# Patient Record
Sex: FEMALE | Race: WHITE | NOT HISPANIC OR LATINO | Employment: FULL TIME | URBAN - METROPOLITAN AREA
[De-identification: names, ages, dates, MRNs, and addresses within clinical notes are randomized per-mention and may not be internally consistent; named-entity substitution may affect disease eponyms.]

---

## 2019-06-26 ENCOUNTER — TELEPHONE (OUTPATIENT)
Dept: INTERNAL MEDICINE CLINIC | Facility: CLINIC | Age: 24
End: 2019-06-26

## 2019-06-30 PROBLEM — Z76.89 ENCOUNTER TO ESTABLISH CARE: Status: ACTIVE | Noted: 2019-06-30

## 2019-06-30 NOTE — PROGRESS NOTES
Assessment/Plan:    Anxiety disorder  -  situational related to specific events  - will start patient on 0 25 mg of alprazolam daily as needed for anxiety  Only 10 tablets prescribed  - I counseled patient on the side effects of addiction and tolerance and counseled her to only take the medication only when absolutely needed   -the [facility] web site was interrogated and did not show misuse  - will check a TSH and a CBC  - patient will need to sign controlled substances agreement before she can get a refill    Seasonal allergies  - well controlled on Zyrtec  -continue with Zyrtec    Need for annual physical exam  - patient has not had a physical done in years  - will order a CBC, CMP, TSH, lipid panel  -patient will return for an annual physical exam in about 1 month  - follow-up in 1 month     Diagnoses and all orders for this visit:    Encounter to establish care    Seasonal allergies  -     CBC and differential; Future    Other specified anxiety disorders  -     TSH, 3rd generation with Free T4 reflex; Future  -     ALPRAZolam (XANAX) 0 25 mg tablet; Take 1 tablet (0 25 mg total) by mouth daily as needed for anxiety    Annual physical exam  -     CBC and differential; Future  -     TSH, 3rd generation with Free T4 reflex; Future  -     Comprehensive metabolic panel; Future  -     Lipid panel; Future    Morbid obesity ([address] )    Other orders  -     cetirizine (ZyrTEC) 10 mg tablet; Take 10 mg by mouth daily          Subjective:      Patient ID: Constance Engle is a 30 y o  female  HPI  Pt is here to establish care  She has a history of anxiety for most of her life  She also has a history of seasonal allergies for which she takes Zyrtec  She also has a family history of anxiety in her grandmother  Patient states that her anxiety occurs when she is involved events such as her sister's bridal shower and wedding and she is most afraid that she may mess up and that people will know that she is anxious  She has associated symptoms of palpitations, dizziness, tremors and diarrhea sometimes  She denies shortness of breath, syncope, a fear of passing out  She states that she can go for months without having any anxiety attack  Her last anxiety attack was about a month ago during her sister's bridal shower  She has not had blood work done in about 5 years  She does not know if there is a family history of thyroid disorder  She denies fatigue, fever, chills, night sweats, chest pain, shortness of breath, nausea, vomiting, abdominal pain, diarrhea, constipation  She admits to weight gain and difficulty losing weight in the past few years  She denies any feelings of depression and poor interest   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  She occasionally drinks alcohol, she does not smoke and she does not use recreational drugs  She denies a family history of drug abuse or addiction  Patient tells me that her height is 5 feet 4 inches and her weight today is 269 with the BMI calculated at 46 17    The following portions of the patient's history were reviewed and updated as appropriate:   She  has no past medical history on file  She   Patient Active Problem List    Diagnosis Date Noted    Seasonal allergies 07/01/2019    Other specified anxiety disorders 07/01/2019    Annual physical exam 07/01/2019    Morbid obesity ([address] ) 07/01/2019    Encounter to establish care 06/30/2019     She  has a past surgical history that includes [place] tooth extraction  Her family history includes Anxiety disorder in her maternal grandmother; Diabetes in her paternal aunt and paternal uncle; Hypertension in her father  She  reports that she has never smoked  She has never used smokeless tobacco  She reports that she drinks alcohol  She reports that she does not use drugs    Current Outpatient Medications   Medication Sig Dispense Refill    cetirizine (ZyrTEC) 10 mg tablet Take 10 mg by mouth daily      ALPRAZolam (XANAX) 0 25 mg tablet Take 1 tablet (0 25 mg total) by mouth daily as needed for anxiety 10 tablet 0     No current facility-administered medications for this visit  Current Outpatient Medications on File Prior to Visit   Medication Sig    cetirizine (ZyrTEC) 10 mg tablet Take 10 mg by mouth daily     No current facility-administered medications on file prior to visit  She is allergic to pollen extract       Review of Systems   Constitutional: Positive for unexpected weight change  Negative for activity change, chills, fatigue and fever  HENT: Positive for congestion and rhinorrhea  Negative for ear pain, postnasal drip, sinus pressure and sore throat  Eyes: Negative for pain  Respiratory: Negative for cough, choking, chest tightness, shortness of breath and wheezing  Cardiovascular: Positive for palpitations  Negative for chest pain and leg swelling  Gastrointestinal: Positive for diarrhea  Negative for abdominal pain, constipation, nausea and vomiting  Genitourinary: Negative for dysuria and hematuria  Musculoskeletal: Negative for arthralgias, back pain, gait problem, joint swelling, myalgias and neck stiffness  Skin: Negative for pallor and rash  Neurological: Positive for dizziness and tremors (only during anxiety attacks)  Negative for seizures, syncope, light-headedness and headaches  Hematological: Negative for adenopathy  Psychiatric/Behavioral: Positive for sleep disturbance  Negative for behavioral problems  The patient is nervous/anxious  History reviewed  No pertinent past medical history        Current Outpatient Medications:     cetirizine (ZyrTEC) 10 mg tablet, Take 10 mg by mouth daily, Disp: , Rfl:     ALPRAZolam (XANAX) 0 25 mg tablet, Take 1 tablet (0 25 mg total) by mouth daily as needed for anxiety, Disp: 10 tablet, Rfl: 0    Allergies   Allergen Reactions    Pollen Extract        Social History   Past Surgical History: Procedure Laterality Date    WISDOM TOOTH EXTRACTION       Family History   Problem Relation Age of Onset    Hypertension Father     Anxiety disorder Maternal Grandmother     Diabetes Paternal Aunt     Diabetes Paternal Uncle        Objective:  /82 (BP Location: Left arm, Patient Position: Sitting, Cuff Size: Large)   Pulse 100   Temp 97 9 °F (36 6 °C) (Oral)   Wt 122 kg (269 lb)   SpO2 98% Comment: ra       Physical Exam   Constitutional: She is oriented to person, place, and time  She appears well-developed and well-nourished  No distress  Morbidly Obese   HENT:   Head: Normocephalic and atraumatic  Right Ear: External ear normal    Left Ear: External ear normal    Nose: Nose normal    Mouth/Throat: Oropharynx is clear and moist  No oropharyngeal exudate  Eyes: Pupils are equal, round, and reactive to light  Conjunctivae and EOM are normal  Right eye exhibits no discharge  Left eye exhibits no discharge  No scleral icterus  Neck: Normal range of motion  Neck supple  No JVD present  No tracheal deviation present  No thyromegaly present  Cardiovascular: Normal rate, regular rhythm, normal heart sounds and intact distal pulses  Exam reveals no gallop and no friction rub  No murmur heard  Pulmonary/Chest: Effort normal and breath sounds normal  No respiratory distress  She has no wheezes  She has no rales  She exhibits no tenderness  Abdominal: Soft  Bowel sounds are normal  She exhibits no distension and no mass  There is no tenderness  There is no rebound and no guarding  Obese abdomen   Musculoskeletal: Normal range of motion  She exhibits no edema, tenderness or deformity  Lymphadenopathy:     She has no cervical adenopathy  Neurological: She is alert and oriented to person, place, and time  She has normal reflexes  No cranial nerve deficit  She exhibits normal muscle tone  Coordination normal    Skin: Skin is warm and dry  No rash noted  She is not diaphoretic  No erythema  No pallor  Psychiatric: She has a normal mood and affect   Her behavior is normal

## 2019-07-01 ENCOUNTER — OFFICE VISIT (OUTPATIENT)
Dept: INTERNAL MEDICINE CLINIC | Facility: CLINIC | Age: 24
End: 2019-07-01
Payer: COMMERCIAL

## 2019-07-01 VITALS
OXYGEN SATURATION: 98 % | TEMPERATURE: 97.9 F | DIASTOLIC BLOOD PRESSURE: 82 MMHG | WEIGHT: 269 LBS | HEART RATE: 100 BPM | SYSTOLIC BLOOD PRESSURE: 128 MMHG

## 2019-07-01 DIAGNOSIS — Z76.89 ENCOUNTER TO ESTABLISH CARE: Primary | ICD-10-CM

## 2019-07-01 DIAGNOSIS — E66.01 MORBID OBESITY (HCC): ICD-10-CM

## 2019-07-01 DIAGNOSIS — F41.8 OTHER SPECIFIED ANXIETY DISORDERS: ICD-10-CM

## 2019-07-01 DIAGNOSIS — J30.2 SEASONAL ALLERGIES: ICD-10-CM

## 2019-07-01 DIAGNOSIS — Z00.00 ANNUAL PHYSICAL EXAM: ICD-10-CM

## 2019-07-01 PROCEDURE — 99203 OFFICE O/P NEW LOW 30 MIN: CPT | Performed by: INTERNAL MEDICINE

## 2019-07-01 PROCEDURE — 1036F TOBACCO NON-USER: CPT | Performed by: INTERNAL MEDICINE

## 2019-07-01 RX ORDER — CETIRIZINE HYDROCHLORIDE 10 MG/1
10 TABLET ORAL AS NEEDED
COMMUNITY

## 2019-07-01 RX ORDER — ALPRAZOLAM 0.25 MG/1
0.25 TABLET ORAL DAILY PRN
Qty: 10 TABLET | Refills: 0 | Status: SHIPPED | OUTPATIENT
Start: 2019-07-01 | End: 2019-10-11 | Stop reason: SDUPTHER

## 2019-07-01 NOTE — PATIENT INSTRUCTIONS
Anxiety   AMBULATORY CARE:   Anxiety  is a condition that causes you to feel extremely worried or nervous  The feelings are so strong that they can cause problems with your daily activities or sleep  Anxiety may be triggered by something you fear, or it may happen without a cause  Family or work stress, smoking, caffeine, and alcohol can increase your risk for anxiety  Certain medicines or health conditions can also increase your risk  Anxiety can become a long-term condition if it is not managed or treated  Common signs and symptoms that may occur with anxiety:   · Fatigue or muscle tightness     · Shaking, restlessness, or irritability     · Problems focusing     · Trouble sleeping     · Feeling jumpy, easily startled, or dizzy     · Rapid heartbeat or shortness of breath  Call 911 if:   · You have chest pain, tightness, or heaviness that may spread to your shoulders, arms, jaw, neck, or back  · You feel like hurting yourself or someone else  Contact your healthcare provider if:   · Your symptoms get worse or do not get better with treatment  · You think your medicine may be causing side effects  · Your anxiety keeps you from doing your regular daily activities  · You have new symptoms since your last visit  · You have questions or concerns about your condition or care  Treatment for anxiety  may include medicines to help you feel calm and relaxed, and decrease your symptoms  Medicines are usually given together with therapy or other treatments  Manage anxiety:   · Talk to someone about your anxiety  Your healthcare provider may suggest counseling  Cognitive behavioral therapy can help you understand and change how you react to events that trigger your symptoms  You might feel more comfortable talking with a friend or family member about your anxiety  Choose someone you know will be supportive and encouraging  · Find ways to relax    Activities such as exercise, meditation, or listening to music can help you relax  Spend time with friends, or do things you enjoy  · Practice deep breathing  Deep breathing can help you relax when you feel anxious  Focus on taking slow, deep breaths several times a day, or during an anxiety attack  Breathe in through your nose and out through your mouth  · Create a regular sleep routine  Regular sleep can help you feel calmer during the day  Go to sleep and wake up at the same times every day  Do not watch television or use the computer right before bed  Your room should be comfortable, dark, and quiet  · Eat a variety of healthy foods  Healthy foods include fruits, vegetables, low-fat dairy products, lean meats, fish, whole-grain breads, and cooked beans  Healthy foods can help you feel less anxious and have more energy  · Exercise regularly  Exercise can increase your energy level  Exercise may also lift your mood and help you sleep better  Your healthcare provider can help you create an exercise plan  · Do not smoke  Nicotine and other chemicals in cigarettes and cigars can increase anxiety  Ask your healthcare provider for information if you currently smoke and need help to quit  E-cigarettes or smokeless tobacco still contain nicotine  Talk to your healthcare provider before you use these products  · Do not have caffeine  Caffeine can make your symptoms worse  Do not have foods or drinks that are meant to increase your energy level  · Limit or do not drink alcohol  Ask your healthcare provider if alcohol is safe for you  You may not be able to drink alcohol if you take certain anxiety or depression medicines  Limit alcohol to 1 drink per day if you are a woman  Limit alcohol to 2 drinks per day if you are a man  A drink of alcohol is 12 ounces of beer, 5 ounces of wine, or 1½ ounces of liquor  · Do not use drugs  Drugs can make your anxiety worse  It can also make anxiety hard to manage   Talk to your healthcare provider if you use drugs and want help to quit  Follow up with your healthcare provider as directed:  Write down your questions so you remember to ask them during your visits  © 2017 [employer] [employer] Information is for End User's use only and may not be sold, redistributed or otherwise used for commercial purposes  All illustrations and images included in CareNotes® are the copyrighted property of [employer]  or [employer]  The above information is an  only  It is not intended as medical advice for individual conditions or treatments  Talk to your doctor, nurse or pharmacist before following any medical regimen to see if it is safe and effective for you

## 2019-07-17 ENCOUNTER — TRANSCRIBE ORDERS (OUTPATIENT)
Dept: ADMINISTRATIVE | Age: 24
End: 2019-07-17

## 2019-07-17 ENCOUNTER — APPOINTMENT (OUTPATIENT)
Dept: LAB | Age: 24
End: 2019-07-17
Payer: COMMERCIAL

## 2019-07-17 DIAGNOSIS — F41.8 OTHER SPECIFIED ANXIETY DISORDERS: ICD-10-CM

## 2019-07-17 DIAGNOSIS — Z00.00 ANNUAL PHYSICAL EXAM: ICD-10-CM

## 2019-07-17 DIAGNOSIS — J30.2 SEASONAL ALLERGIES: ICD-10-CM

## 2019-07-17 LAB
ALBUMIN SERPL BCP-MCNC: 3.7 G/DL (ref 3.5–5)
ALP SERPL-CCNC: 73 U/L (ref 46–116)
ALT SERPL W P-5'-P-CCNC: 27 U/L (ref 12–78)
ANION GAP SERPL CALCULATED.3IONS-SCNC: 7 MMOL/L (ref 4–13)
AST SERPL W P-5'-P-CCNC: 16 U/L (ref 5–45)
BASOPHILS # BLD AUTO: 0.04 THOUSANDS/ΜL (ref 0–0.1)
BASOPHILS NFR BLD AUTO: 1 % (ref 0–1)
BILIRUB SERPL-MCNC: 0.53 MG/DL (ref 0.2–1)
BUN SERPL-MCNC: 13 MG/DL (ref 5–25)
CALCIUM SERPL-MCNC: 9.1 MG/DL (ref 8.3–10.1)
CHLORIDE SERPL-SCNC: 107 MMOL/L (ref 100–108)
CHOLEST SERPL-MCNC: 191 MG/DL (ref 50–200)
CO2 SERPL-SCNC: 25 MMOL/L (ref 21–32)
CREAT SERPL-MCNC: 0.72 MG/DL (ref 0.6–1.3)
EOSINOPHIL # BLD AUTO: 0.11 THOUSAND/ΜL (ref 0–0.61)
EOSINOPHIL NFR BLD AUTO: 2 % (ref 0–6)
ERYTHROCYTE [DISTWIDTH] IN BLOOD BY AUTOMATED COUNT: 13.4 % (ref 11.6–15.1)
GFR SERPL CREATININE-BSD FRML MDRD: 116 ML/MIN/1.73SQ M
GLUCOSE P FAST SERPL-MCNC: 88 MG/DL (ref 65–99)
HCT VFR BLD AUTO: 43 % (ref 34.8–46.1)
HDLC SERPL-MCNC: 76 MG/DL (ref 40–60)
HGB BLD-MCNC: 13.8 G/DL (ref 11.5–15.4)
IMM GRANULOCYTES # BLD AUTO: 0.01 THOUSAND/UL (ref 0–0.2)
IMM GRANULOCYTES NFR BLD AUTO: 0 % (ref 0–2)
LDLC SERPL CALC-MCNC: 93 MG/DL (ref 0–100)
LYMPHOCYTES # BLD AUTO: 2.04 THOUSANDS/ΜL (ref 0.6–4.47)
LYMPHOCYTES NFR BLD AUTO: 33 % (ref 14–44)
MCH RBC QN AUTO: 27.3 PG (ref 26.8–34.3)
MCHC RBC AUTO-ENTMCNC: 32.1 G/DL (ref 31.4–37.4)
MCV RBC AUTO: 85 FL (ref 82–98)
MONOCYTES # BLD AUTO: 0.57 THOUSAND/ΜL (ref 0.17–1.22)
MONOCYTES NFR BLD AUTO: 9 % (ref 4–12)
NEUTROPHILS # BLD AUTO: 3.37 THOUSANDS/ΜL (ref 1.85–7.62)
NEUTS SEG NFR BLD AUTO: 55 % (ref 43–75)
NONHDLC SERPL-MCNC: 115 MG/DL
NRBC BLD AUTO-RTO: 0 /100 WBCS
PLATELET # BLD AUTO: 194 THOUSANDS/UL (ref 149–390)
PMV BLD AUTO: 12.8 FL (ref 8.9–12.7)
POTASSIUM SERPL-SCNC: 3.9 MMOL/L (ref 3.5–5.3)
PROT SERPL-MCNC: 7.1 G/DL (ref 6.4–8.2)
RBC # BLD AUTO: 5.05 MILLION/UL (ref 3.81–5.12)
SODIUM SERPL-SCNC: 139 MMOL/L (ref 136–145)
TRIGL SERPL-MCNC: 111 MG/DL
TSH SERPL DL<=0.05 MIU/L-ACNC: 2.8 UIU/ML (ref 0.36–3.74)
WBC # BLD AUTO: 6.14 THOUSAND/UL (ref 4.31–10.16)

## 2019-07-17 PROCEDURE — 85025 COMPLETE CBC W/AUTO DIFF WBC: CPT

## 2019-07-17 PROCEDURE — 84443 ASSAY THYROID STIM HORMONE: CPT

## 2019-07-17 PROCEDURE — 36415 COLL VENOUS BLD VENIPUNCTURE: CPT

## 2019-07-17 PROCEDURE — 80053 COMPREHEN METABOLIC PANEL: CPT

## 2019-07-17 PROCEDURE — 80061 LIPID PANEL: CPT

## 2019-07-18 ENCOUNTER — TELEPHONE (OUTPATIENT)
Dept: INTERNAL MEDICINE CLINIC | Age: 24
End: 2019-07-18

## 2019-07-18 NOTE — TELEPHONE ENCOUNTER
I called and left a message for patient to call the office regarding her lab results  I also told her that she may wait till her next visit to discuss them if she so wishes

## 2019-07-29 ENCOUNTER — OFFICE VISIT (OUTPATIENT)
Dept: INTERNAL MEDICINE CLINIC | Age: 24
End: 2019-07-29
Payer: COMMERCIAL

## 2019-07-29 VITALS
TEMPERATURE: 99.1 F | SYSTOLIC BLOOD PRESSURE: 128 MMHG | DIASTOLIC BLOOD PRESSURE: 88 MMHG | WEIGHT: 272.4 LBS | OXYGEN SATURATION: 99 % | HEART RATE: 96 BPM

## 2019-07-29 DIAGNOSIS — Z00.00 ANNUAL PHYSICAL EXAM: Primary | ICD-10-CM

## 2019-07-29 DIAGNOSIS — E66.01 MORBID OBESITY (HCC): ICD-10-CM

## 2019-07-29 DIAGNOSIS — J06.9 VIRAL UPPER RESPIRATORY TRACT INFECTION: ICD-10-CM

## 2019-07-29 PROCEDURE — 99395 PREV VISIT EST AGE 18-39: CPT | Performed by: INTERNAL MEDICINE

## 2019-07-29 NOTE — PATIENT INSTRUCTIONS

## 2019-08-05 ENCOUNTER — TELEPHONE (OUTPATIENT)
Dept: INTERNAL MEDICINE CLINIC | Age: 24
End: 2019-08-05

## 2019-08-05 ENCOUNTER — TELEPHONE (OUTPATIENT)
Dept: INTERNAL MEDICINE CLINIC | Facility: CLINIC | Age: 24
End: 2019-08-05

## 2019-08-05 DIAGNOSIS — J40 BRONCHITIS: Primary | ICD-10-CM

## 2019-08-05 RX ORDER — AZITHROMYCIN 250 MG/1
TABLET, FILM COATED ORAL
Qty: 6 TABLET | Refills: 0 | Status: SHIPPED | OUTPATIENT
Start: 2019-08-05 | End: 2019-08-09

## 2019-08-05 NOTE — TELEPHONE ENCOUNTER
Reviewed dr Genoveva Rich note  States if no improvement in 7-10 days for her to call and likely start abx  Seen 7 days ago  LMOM    Want to d/w pt s/sx for appropriate abx

## 2019-08-05 NOTE — TELEPHONE ENCOUNTER
See first telephone call  Pt called requesting abx and continued with s/sx x 7 days  Reviewed dr Bryan Go note and plan was to start abx if persist after 7-10 days  Pt called back  She reports that she is having cough - yellow mucous, aching all over  Denies sinus pressure, nasal congestion  Pt is not pregnant or breast feeding  No birth control, no sexually active    Will start z-pack

## 2019-08-05 NOTE — TELEPHONE ENCOUNTER
Patient saw Dr Cecile Doan last week for a physical and noted to have a fever  Patient now has green mucus and sinus symptoms  Asking if something can be called into the [facility] pharmacy on [facility]

## 2019-09-03 ENCOUNTER — OFFICE VISIT (OUTPATIENT)
Dept: INTERNAL MEDICINE CLINIC | Age: 24
End: 2019-09-03
Payer: COMMERCIAL

## 2019-09-03 ENCOUNTER — HOSPITAL ENCOUNTER (INPATIENT)
Facility: HOSPITAL | Age: 24
LOS: 3 days | Discharge: HOME | DRG: 813 | End: 2019-09-06
Attending: EMERGENCY MEDICINE | Admitting: INTERNAL MEDICINE
Payer: COMMERCIAL

## 2019-09-03 ENCOUNTER — APPOINTMENT (OUTPATIENT)
Dept: LAB | Age: 24
DRG: 813 | End: 2019-09-03
Payer: COMMERCIAL

## 2019-09-03 VITALS
SYSTOLIC BLOOD PRESSURE: 128 MMHG | WEIGHT: 270 LBS | OXYGEN SATURATION: 99 % | DIASTOLIC BLOOD PRESSURE: 76 MMHG | TEMPERATURE: 98.9 F | HEIGHT: 65 IN | BODY MASS INDEX: 44.98 KG/M2 | HEART RATE: 102 BPM

## 2019-09-03 DIAGNOSIS — D69.6 THROMBOCYTOPENIA (HCC): Primary | ICD-10-CM

## 2019-09-03 DIAGNOSIS — R23.3 PETECHIAE: Primary | ICD-10-CM

## 2019-09-03 DIAGNOSIS — R23.3 PETECHIAE: ICD-10-CM

## 2019-09-03 DIAGNOSIS — D69.3 ACUTE ITP (HCC): Primary | ICD-10-CM

## 2019-09-03 DIAGNOSIS — D69.6 THROMBOCYTOPENIA (HCC): ICD-10-CM

## 2019-09-03 PROBLEM — Z00.00 ANNUAL PHYSICAL EXAM: Status: RESOLVED | Noted: 2019-07-01 | Resolved: 2019-09-03

## 2019-09-03 PROBLEM — Z76.89 ENCOUNTER TO ESTABLISH CARE: Status: RESOLVED | Noted: 2019-06-30 | Resolved: 2019-09-03

## 2019-09-03 PROBLEM — J06.9 VIRAL UPPER RESPIRATORY TRACT INFECTION: Status: RESOLVED | Noted: 2019-07-29 | Resolved: 2019-09-03

## 2019-09-03 LAB
ABO GROUP BLD: NORMAL
ALBUMIN SERPL BCP-MCNC: 4.3 G/DL (ref 3.5–5)
ALP SERPL-CCNC: 87 U/L (ref 46–116)
ALT SERPL W P-5'-P-CCNC: 30 U/L (ref 12–78)
ANION GAP SERPL CALCULATED.3IONS-SCNC: 7 MMOL/L (ref 4–13)
ANION GAP SERPL CALCULATED.3IONS-SCNC: 7 MMOL/L (ref 4–13)
APTT PPP: 30 SECONDS (ref 23–37)
AST SERPL W P-5'-P-CCNC: 15 U/L (ref 5–45)
BASOPHILS # BLD AUTO: 0.02 THOUSANDS/ΜL (ref 0–0.1)
BASOPHILS # BLD AUTO: 0.05 THOUSANDS/ΜL (ref 0–0.1)
BASOPHILS NFR BLD AUTO: 0 % (ref 0–1)
BASOPHILS NFR BLD AUTO: 1 % (ref 0–1)
BILIRUB SERPL-MCNC: 0.44 MG/DL (ref 0.2–1)
BLD GP AB SCN SERPL QL: NEGATIVE
BLD SMEAR INTERP: NORMAL
BUN SERPL-MCNC: 12 MG/DL (ref 5–25)
BUN SERPL-MCNC: 12 MG/DL (ref 5–25)
CALCIUM SERPL-MCNC: 9.6 MG/DL (ref 8.3–10.1)
CALCIUM SERPL-MCNC: 9.9 MG/DL (ref 8.3–10.1)
CHLORIDE SERPL-SCNC: 106 MMOL/L (ref 100–108)
CHLORIDE SERPL-SCNC: 108 MMOL/L (ref 100–108)
CO2 SERPL-SCNC: 27 MMOL/L (ref 21–32)
CO2 SERPL-SCNC: 27 MMOL/L (ref 21–32)
CREAT SERPL-MCNC: 0.75 MG/DL (ref 0.6–1.3)
CREAT SERPL-MCNC: 0.78 MG/DL (ref 0.6–1.3)
EOSINOPHIL # BLD AUTO: 0.03 THOUSAND/ΜL (ref 0–0.61)
EOSINOPHIL # BLD AUTO: 0.05 THOUSAND/ΜL (ref 0–0.61)
EOSINOPHIL NFR BLD AUTO: 1 % (ref 0–6)
EOSINOPHIL NFR BLD AUTO: 1 % (ref 0–6)
ERYTHROCYTE [DISTWIDTH] IN BLOOD BY AUTOMATED COUNT: 13.2 % (ref 11.6–15.1)
ERYTHROCYTE [DISTWIDTH] IN BLOOD BY AUTOMATED COUNT: 13.2 % (ref 11.6–15.1)
ERYTHROCYTE [SEDIMENTATION RATE] IN BLOOD: 22 MM/HOUR (ref 0–20)
GFR SERPL CREATININE-BSD FRML MDRD: 105 ML/MIN/1.73SQ M
GFR SERPL CREATININE-BSD FRML MDRD: 110 ML/MIN/1.73SQ M
GLUCOSE P FAST SERPL-MCNC: 111 MG/DL (ref 65–99)
GLUCOSE SERPL-MCNC: 138 MG/DL (ref 65–140)
HCT VFR BLD AUTO: 41.8 % (ref 34.8–46.1)
HCT VFR BLD AUTO: 44.1 % (ref 34.8–46.1)
HGB BLD-MCNC: 14 G/DL (ref 11.5–15.4)
HGB BLD-MCNC: 14.3 G/DL (ref 11.5–15.4)
HIV 1+2 AB+HIV1 P24 AG SERPL QL IA: NORMAL
HIV1 P24 AG SER QL: NORMAL
IMM GRANULOCYTES # BLD AUTO: 0.02 THOUSAND/UL (ref 0–0.2)
IMM GRANULOCYTES # BLD AUTO: 0.04 THOUSAND/UL (ref 0–0.2)
IMM GRANULOCYTES NFR BLD AUTO: 0 % (ref 0–2)
IMM GRANULOCYTES NFR BLD AUTO: 1 % (ref 0–2)
INR PPP: 1.01 (ref 0.84–1.19)
INR PPP: 1.01 (ref 0.84–1.19)
LYMPHOCYTES # BLD AUTO: 1.04 THOUSANDS/ΜL (ref 0.6–4.47)
LYMPHOCYTES # BLD AUTO: 1.19 THOUSANDS/ΜL (ref 0.6–4.47)
LYMPHOCYTES NFR BLD AUTO: 14 % (ref 14–44)
LYMPHOCYTES NFR BLD AUTO: 22 % (ref 14–44)
MCH RBC QN AUTO: 27.2 PG (ref 26.8–34.3)
MCH RBC QN AUTO: 27.8 PG (ref 26.8–34.3)
MCHC RBC AUTO-ENTMCNC: 32.4 G/DL (ref 31.4–37.4)
MCHC RBC AUTO-ENTMCNC: 33.5 G/DL (ref 31.4–37.4)
MCV RBC AUTO: 83 FL (ref 82–98)
MCV RBC AUTO: 84 FL (ref 82–98)
MONOCYTES # BLD AUTO: 0.41 THOUSAND/ΜL (ref 0.17–1.22)
MONOCYTES # BLD AUTO: 0.5 THOUSAND/ΜL (ref 0.17–1.22)
MONOCYTES NFR BLD AUTO: 7 % (ref 4–12)
MONOCYTES NFR BLD AUTO: 8 % (ref 4–12)
NEUTROPHILS # BLD AUTO: 3.68 THOUSANDS/ΜL (ref 1.85–7.62)
NEUTROPHILS # BLD AUTO: 5.99 THOUSANDS/ΜL (ref 1.85–7.62)
NEUTS SEG NFR BLD AUTO: 69 % (ref 43–75)
NEUTS SEG NFR BLD AUTO: 76 % (ref 43–75)
NRBC BLD AUTO-RTO: 0 /100 WBCS
NRBC BLD AUTO-RTO: 0 /100 WBCS
PLATELET # BLD AUTO: 0 THOUSANDS/UL (ref 149–390)
PLATELET # BLD AUTO: 1 THOUSANDS/UL (ref 149–390)
POTASSIUM SERPL-SCNC: 3.6 MMOL/L (ref 3.5–5.3)
POTASSIUM SERPL-SCNC: 4.5 MMOL/L (ref 3.5–5.3)
PROT SERPL-MCNC: 7.8 G/DL (ref 6.4–8.2)
PROTHROMBIN TIME: 12.9 SECONDS (ref 11.6–14.5)
PROTHROMBIN TIME: 12.9 SECONDS (ref 11.6–14.5)
RBC # BLD AUTO: 5.03 MILLION/UL (ref 3.81–5.12)
RBC # BLD AUTO: 5.25 MILLION/UL (ref 3.81–5.12)
RH BLD: POSITIVE
SODIUM SERPL-SCNC: 140 MMOL/L (ref 136–145)
SODIUM SERPL-SCNC: 142 MMOL/L (ref 136–145)
SPECIMEN EXPIRATION DATE: NORMAL
TSH SERPL DL<=0.05 MIU/L-ACNC: 1.47 UIU/ML (ref 0.36–3.74)
WBC # BLD AUTO: 5.35 THOUSAND/UL (ref 4.31–10.16)
WBC # BLD AUTO: 7.67 THOUSAND/UL (ref 4.31–10.16)

## 2019-09-03 PROCEDURE — 85730 THROMBOPLASTIN TIME PARTIAL: CPT | Performed by: EMERGENCY MEDICINE

## 2019-09-03 PROCEDURE — 86039 ANTINUCLEAR ANTIBODIES (ANA): CPT | Performed by: EMERGENCY MEDICINE

## 2019-09-03 PROCEDURE — 80053 COMPREHEN METABOLIC PANEL: CPT

## 2019-09-03 PROCEDURE — P9035 PLATELET PHERES LEUKOREDUCED: HCPCS

## 2019-09-03 PROCEDURE — 80048 BASIC METABOLIC PNL TOTAL CA: CPT | Performed by: EMERGENCY MEDICINE

## 2019-09-03 PROCEDURE — 86900 BLOOD TYPING SEROLOGIC ABO: CPT | Performed by: EMERGENCY MEDICINE

## 2019-09-03 PROCEDURE — 85610 PROTHROMBIN TIME: CPT

## 2019-09-03 PROCEDURE — 99284 EMERGENCY DEPT VISIT MOD MDM: CPT

## 2019-09-03 PROCEDURE — 86850 RBC ANTIBODY SCREEN: CPT | Performed by: EMERGENCY MEDICINE

## 2019-09-03 PROCEDURE — 86901 BLOOD TYPING SEROLOGIC RH(D): CPT | Performed by: EMERGENCY MEDICINE

## 2019-09-03 PROCEDURE — 85652 RBC SED RATE AUTOMATED: CPT | Performed by: EMERGENCY MEDICINE

## 2019-09-03 PROCEDURE — 86038 ANTINUCLEAR ANTIBODIES: CPT | Performed by: EMERGENCY MEDICINE

## 2019-09-03 PROCEDURE — 99213 OFFICE O/P EST LOW 20 MIN: CPT | Performed by: NURSE PRACTITIONER

## 2019-09-03 PROCEDURE — 36415 COLL VENOUS BLD VENIPUNCTURE: CPT

## 2019-09-03 PROCEDURE — 85025 COMPLETE CBC W/AUTO DIFF WBC: CPT | Performed by: EMERGENCY MEDICINE

## 2019-09-03 PROCEDURE — P9037 PLATE PHERES LEUKOREDU IRRAD: HCPCS

## 2019-09-03 PROCEDURE — 84443 ASSAY THYROID STIM HORMONE: CPT | Performed by: EMERGENCY MEDICINE

## 2019-09-03 PROCEDURE — 99284 EMERGENCY DEPT VISIT MOD MDM: CPT | Performed by: EMERGENCY MEDICINE

## 2019-09-03 PROCEDURE — 85025 COMPLETE CBC W/AUTO DIFF WBC: CPT

## 2019-09-03 PROCEDURE — 85610 PROTHROMBIN TIME: CPT | Performed by: EMERGENCY MEDICINE

## 2019-09-03 PROCEDURE — P9100 PATHOGEN TEST FOR PLATELETS: HCPCS

## 2019-09-03 PROCEDURE — 80074 ACUTE HEPATITIS PANEL: CPT | Performed by: EMERGENCY MEDICINE

## 2019-09-03 PROCEDURE — 87806 HIV AG W/HIV1&2 ANTB W/OPTIC: CPT | Performed by: EMERGENCY MEDICINE

## 2019-09-03 PROCEDURE — 30233R1 TRANSFUSION OF NONAUTOLOGOUS PLATELETS INTO PERIPHERAL VEIN, PERCUTANEOUS APPROACH: ICD-10-PCS | Performed by: INTERNAL MEDICINE

## 2019-09-03 RX ORDER — MULTIVITAMIN
1 TABLET ORAL DAILY
COMMUNITY

## 2019-09-03 RX ORDER — DIPHENHYDRAMINE HCL 25 MG
25 TABLET ORAL EVERY 6 HOURS PRN
Status: DISCONTINUED | OUTPATIENT
Start: 2019-09-03 | End: 2019-09-06 | Stop reason: HOSPADM

## 2019-09-03 RX ORDER — ACETAMINOPHEN 325 MG/1
650 TABLET ORAL EVERY 6 HOURS PRN
Status: DISCONTINUED | OUTPATIENT
Start: 2019-09-03 | End: 2019-09-06 | Stop reason: HOSPADM

## 2019-09-03 RX ADMIN — DIPHENHYDRAMINE HCL 25 MG: 25 TABLET, COATED ORAL at 21:10

## 2019-09-03 RX ADMIN — SODIUM CHLORIDE 1000 MG: 0.9 INJECTION, SOLUTION INTRAVENOUS at 21:46

## 2019-09-03 NOTE — ED PROVIDER NOTES
History  Chief Complaint   Patient presents with    Abnormal Lab     pt presents to ED with c/o low platelet count, pt was at PCP today and was diagnosed with critically low platelets      [id number] female with no previous medical history presenting the emergency department with low platelets  Patient noted petechia on her body yesterday and went to her primary care physician  Primary care physician ordered a CBC which showed platelet level of less than 1  Patient denies any bleeding,  Vaginal discharge,hematuria, fevers, chills, nausea, vomiting, dizziness  Patient denies history of ITP, TTP  Patient has a family history of TTP and thalassemia  Patient was sick with an upper respiratory infection approximately 1 month ago  Patient also notes alcohol intake and NSAID usage this last weekend  Patient is on Xanax  P r n  And no other medications  Patient denies pain  Rash   Location:  Full body  Quality: not painful and not peeling    Quality comment:  Petechiae  Severity:  Moderate  Onset quality:  Sudden  Timing:  Constant  Progression:  Worsening  Chronicity:  New  Relieved by:  None tried  Worsened by:  Nothing  Ineffective treatments:  None tried      Prior to Admission Medications   Prescriptions Last Dose Informant Patient Reported? Taking?    ALPRAZolam (XANAX) 0 25 mg tablet Past Month at Unknown time  No Yes   Sig: Take 1 tablet (0 25 mg total) by mouth daily as needed for anxiety   Multiple Vitamin (MULTIVITAMIN) tablet 9/2/2019 at Unknown time  Yes Yes   Sig: Take 1 tablet by mouth daily   cetirizine (ZyrTEC) 10 mg tablet More than a month at Unknown time  Yes No   Sig: Take 10 mg by mouth daily      Facility-Administered Medications: None       Past Medical History:   Diagnosis Date    Anxiety        Past Surgical History:   Procedure Laterality Date    WISDOM TOOTH EXTRACTION         Family History   Problem Relation Age of Onset    Hypertension Father     Anxiety disorder Maternal Grandmother     Diabetes Paternal Aunt     Diabetes Paternal Uncle      I have reviewed and agree with the history as documented  Social History     Tobacco Use    Smoking status: Never Smoker    Smokeless tobacco: Never Used   Substance Use Topics    Alcohol use: Yes     Comment: rare    Drug use: Never        Review of Systems   Skin: Positive for rash  All other systems reviewed and are negative  Physical Exam  ED Triage Vitals   Temperature Pulse Respirations Blood Pressure SpO2   09/03/19 1937 09/03/19 1603 09/03/19 1603 09/03/19 1603 09/03/19 1603   99 1 °F (37 3 °C) (!) 110 18 167/65 100 %      Temp Source Heart Rate Source Patient Position - Orthostatic VS BP Location FiO2 (%)   09/03/19 2331 09/03/19 1603 09/03/19 1603 09/03/19 1603 --   Oral Monitor Lying Left arm       Pain Score       09/03/19 1603       No Pain             Orthostatic Vital Signs  Vitals:    09/03/19 2331 09/03/19 2348 09/04/19 0018 09/04/19 0019   BP: 146/91 156/91 155/92 155/92   Pulse: 102 80 85 83   Patient Position - Orthostatic VS:           Physical Exam   Constitutional: She appears well-developed and well-nourished  No distress  Well-appearing obese female  HENT:   Head: Normocephalic and atraumatic  Right Ear: External ear normal    Left Ear: External ear normal    Eyes: Conjunctivae and EOM are normal    Neck: No JVD present  No tracheal deviation present  Cardiovascular: Normal rate, regular rhythm, normal heart sounds and intact distal pulses  No murmur heard  Pulmonary/Chest: Breath sounds normal  No stridor  No respiratory distress  She has no wheezes  She has no rales  Abdominal: Soft  Bowel sounds are normal  She exhibits no distension and no mass  There is no tenderness  There is no rebound and no guarding  Genitourinary:   Genitourinary Comments: Deferred   Musculoskeletal: She exhibits no edema, tenderness or deformity  Neurological: She exhibits normal muscle tone  Coordination normal    Skin: Skin is warm and dry  Capillary refill takes less than 2 seconds  Rash (  Diffuse petechia throughout the abdomen, chest, extremities and intraorally ) noted  She is not diaphoretic  No erythema  No pallor  Psychiatric: She has a normal mood and affect  Her behavior is normal  Judgment and thought content normal    Nursing note and vitals reviewed  ED Medications  Medications   acetaminophen (TYLENOL) tablet 650 mg (has no administration in time range)   diphenhydrAMINE (BENADRYL) tablet 25 mg (25 mg Oral Given 9/3/19 2110)   methylPREDNISolone sodium succinate (Solu-MEDROL) 1,000 mg in sodium chloride 0 9 % 250 mL IVPB (1,000 mg Intravenous New Bag 9/3/19 2146)       Diagnostic Studies  Results Reviewed     Procedure Component Value Units Date/Time    Sedimentation rate, automated [[id number]]  (Abnormal) Collected:  09/03/19 1646    Lab Status:  Final result Specimen:  Blood from Arm, Left Updated:  09/03/19 2008     Sed Rate 22 mm/hour     Hemolysis Smear [[id number]] Collected:  09/03/19 1829    Lab Status:  Final result Specimen:  Arm, Left Updated:  09/03/19 1953     Hemolysis Smear No Schistocytes or Helmet Cells noted    ZEKE Screen w/ Reflex to Titer/Pattern [[id number]] Collected:  09/03/19 1829    Lab Status: In process Specimen:  Blood from Arm, Left Updated:  09/03/19 1832    Rapid HIV 1/2 AB-AG Combo [[id number]]  (Normal) Collected:  09/03/19 1646    Lab Status:  Final result Specimen:  Blood from Arm, Left Updated:  09/03/19 1818     Rapid HIV 1 AND 2 Non-Reactive     HIV-1 P24 Ag Screen Non-Reactive    Narrative:       Negative for HIV-1 p24 Antigen  Negative for HIV-1 and/or HIV-2 Antibody      CBC and differential [[id number]]  (Abnormal) Collected:  09/03/19 1646    Lab Status:  Final result Specimen:  Blood from Arm, Left Updated:  09/03/19 1735     WBC 5 35 Thousand/uL      RBC 5 03 Million/uL      Hemoglobin 14 0 g/dL      Hematocrit 41 8 %      MCV 83 fL MCH 27 8 pg      MCHC 33 5 g/dL      RDW 13 2 %      Platelets 0 Thousands/uL      nRBC 0 /100 WBCs      Neutrophils Relative 69 %      Immat GRANS % 0 %      Lymphocytes Relative 22 %      Monocytes Relative 8 %      Eosinophils Relative 1 %      Basophils Relative 0 %      Neutrophils Absolute 3 68 Thousands/µL      Immature Grans Absolute 0 02 Thousand/uL      Lymphocytes Absolute 1 19 Thousands/µL      Monocytes Absolute 0 41 Thousand/µL      Eosinophils Absolute 0 03 Thousand/µL      Basophils Absolute 0 02 Thousands/µL     TSH, 3rd generation with Free T4 reflex [[id number]]  (Normal) Collected:  09/03/19 1646    Lab Status:  Final result Specimen:  Blood from Arm, Left Updated:  09/03/19 1734     TSH 3RD GENERATON 1 470 uIU/mL     Narrative:       Patients undergoing fluorescein dye angiography may retain small amounts of fluorescein in the body for 48-72 hours post procedure  Samples containing fluorescein can produce falsely depressed TSH values  If the patient had this procedure,a specimen should be resubmitted post fluorescein clearance        Basic metabolic panel [[id number]] Collected:  09/03/19 1646    Lab Status:  Final result Specimen:  Blood from Arm, Left Updated:  09/03/19 1734     Sodium 140 mmol/L      Potassium 3 6 mmol/L      Chloride 106 mmol/L      CO2 27 mmol/L      ANION GAP 7 mmol/L      BUN 12 mg/dL      Creatinine 0 75 mg/dL      Glucose 138 mg/dL      Calcium 9 6 mg/dL      eGFR 110 ml/min/1 73sq m     Narrative:       [place] guidelines for Chronic Kidney Disease (CKD):     Stage 1 with normal or high GFR (GFR > 90 mL/min/1 73 square meters)    Stage 2 Mild CKD (GFR = 60-89 mL/min/1 73 square meters)    Stage 3A Moderate CKD (GFR = 45-59 mL/min/1 73 square meters)    Stage 3B Moderate CKD (GFR = 30-44 mL/min/1 73 square meters)    Stage 4 Severe CKD (GFR = 15-29 mL/min/1 73 square meters)    Stage 5 End Stage CKD (GFR <15 mL/min/1 73 square meters)  Note: GFR calculation is accurate only with a steady state creatinine    Protime-INR [[id number]]  (Normal) Collected:  09/03/19 1646    Lab Status:  Final result Specimen:  Blood from Arm, Left Updated:  09/03/19 1723     Protime 12 9 seconds      INR 1 01    APTT [[id number]]  (Normal) Collected:  09/03/19 1646    Lab Status:  Final result Specimen:  Blood from Arm, Left Updated:  09/03/19 1723     PTT 30 seconds     Hepatitis panel, acute [[id number]] Collected:  09/03/19 1646    Lab Status: In process Specimen:  Blood from Arm, Left Updated:  09/03/19 1655                 No orders to display         Procedures  Procedures        ED Course  ED Course as of Sep 04 0041 Tue Sep 03, 2019   1818   Spoke to on-call oncologist   He recommended 1 g Solu-Medrol, sed rate, ZEKE, coags,                                  MDM  Number of Diagnoses or Management Options  Acute ITP ([facility] [address] ): new and requires workup  Thrombocytopenia [facility]): new and requires workup  Diagnosis management comments:  Differential diagnosis includes ITP most likely, less likely TTP as she has no neurological deficits or fevers, HUS less likely as patient has no hematuria or recent infection, drug reaction less likely as patient is on no medications that cause thrombocytopenia  Patrizia Dai will include CBC w diff, BMP, HIV, hepatitis panel, coags  CBC shows 0 platelets  I spoke to the on-call oncologist   They recommended 1 g of Solu-Medrol, sedimentation rate, transfusing 2 units of leuko reduced platelets  Will admit patient to medicine for thrombocytopenia, likely ITP         Amount and/or Complexity of Data Reviewed  Clinical lab tests: ordered and reviewed  Discussion of test results with the performing providers: yes  Decide to obtain previous medical records or to obtain history from someone other than the patient: yes  Review and summarize past medical records: yes  Discuss the patient with other providers: yes  Independent visualization of images, tracings, or specimens: yes    Risk of Complications, Morbidity, and/or Mortality  Presenting problems: high  Diagnostic procedures: low  Management options: high        Disposition  Final diagnoses:   Acute ITP ([facility] [address] )   Thrombocytopenia ([facility] )     Time reflects when diagnosis was documented in both MDM as applicable and the Disposition within this note     Time User Action Codes Description Comment    9/3/2019  6:29 PM Makeda Aramis [D69 3] Acute ITP ([facility] )     9/3/2019  6:29 PM Mercedes Blackburn Add [D69 6] Thrombocytopenia [facility])       ED Disposition     ED Disposition Condition Date/Time Comment    Admit Stable Tue Sep 3, 2019  6:29 PM Case was discussed with Jazmin Fried and the patient's admission status was agreed to be Admission Status: inpatient status to the service of Dr Jazmin Fried   Follow-up Information    None         Current Discharge Medication List      CONTINUE these medications which have NOT CHANGED    Details   ALPRAZolam (XANAX) 0 25 mg tablet Take 1 tablet (0 25 mg total) by mouth daily as needed for anxiety  Qty: 10 tablet, Refills: 0    Associated Diagnoses: Other specified anxiety disorders      Multiple Vitamin (MULTIVITAMIN) tablet Take 1 tablet by mouth daily      cetirizine (ZyrTEC) 10 mg tablet Take 10 mg by mouth daily           No discharge procedures on file  ED Provider  Attending physically available and evaluated Artem Clifford I managed the patient along with the ED Attending      Electronically Signed by         Nano Kidd DO  09/04/19 Judson Nava DO  09/04/19 1782

## 2019-09-03 NOTE — ED ATTENDING ATTESTATION
Dixon Haney DO, saw and evaluated the patient  I have discussed the patient with the resident/non-physician practitioner and agree with the resident's/non-physician practitioner's findings, Plan of Care, and MDM as documented in the resident's/non-physician practitioner's note, except where noted  All available labs and Radiology studies were reviewed  At this point I agree with the current assessment done in the Emergency Department  I have conducted an independent evaluation of this patient including a focused history and a physical exam     ED Note - Miranda Anand 30 y o  female MRN: [id number]  Unit/Bed#: ED 20 Encounter: [id number]    History of Present Illness   HPI  Miranda Anand is a 30 y o  female who presents for evaluation of petechiae with report of severe thrombocytopenia  No recent illness  Last travel out of [facility] to [facility] was approximately 1 month ago  Patient states that she noted a rash on her face which then progressed throughout her body fully diagnosis petechiae  The patient was evaluated primary care physician had ordered blood work and to the patient to the emergency department after results were obtained  Patient was is no complaints at this time  REVIEW OF SYSTEMS  See HPI for further details  12 systems reviewed and otherwise negative except as noted     Historical Information     PAST MEDICAL HISTORY  Past Medical History:   Diagnosis Date    Anxiety        FAMILY HISTORY  Family History   Problem Relation Age of Onset    Hypertension Father     Anxiety disorder Maternal Grandmother     Diabetes Paternal Aunt     Diabetes Paternal Uncle        SOCIAL HISTORY  Social History     Socioeconomic History    Marital status: Single     Spouse name: None    Number of children: None    Years of education: None    Highest education level: None   Occupational History    None   Social Needs    Financial resource strain: None    Food insecurity:     Worry: None Inability: None    Transportation needs:     Medical: None     Non-medical: None   Tobacco Use    Smoking status: Never Smoker    Smokeless tobacco: Never Used   Substance and Sexual Activity    Alcohol use: Yes     Comment: rare    Drug use: Never    Sexual activity: None   Lifestyle    Physical activity:     Days per week: None     Minutes per session: None    Stress: None   Relationships    Social connections:     Talks on phone: None     Gets together: None     Attends [employer] service: None     Active member of club or organization: None     Attends meetings of clubs or organizations: None     Relationship status: None    Intimate partner violence:     Fear of current or ex partner: None     Emotionally abused: None     Physically abused: None     Forced sexual activity: None   Other Topics Concern    None   Social History Narrative    None       SURGICAL HISTORY  Past Surgical History:   Procedure Laterality Date    WISDOM TOOTH EXTRACTION       Meds/Allergies     CURRENT MEDICATIONS  No current facility-administered medications for this encounter  Current Outpatient Medications:     ALPRAZolam (XANAX) 0 25 mg tablet, Take 1 tablet (0 25 mg total) by mouth daily as needed for anxiety, Disp: 10 tablet, Rfl: 0    cetirizine (ZyrTEC) 10 mg tablet, Take 10 mg by mouth daily, Disp: , Rfl:     (Not in a hospital admission)    ALLERGIES  Allergies   Allergen Reactions    Pollen Extract      Objective     PHYSICAL EXAM    VITAL SIGNS: Blood pressure 167/65, pulse (!) 110, resp  rate 18, height 5' 5" (1 651 m), weight 122 kg (270 lb), SpO2 100 %      Constitutional:  Appears well developed and well nourished, no acute distress, non-toxic appearance   Eyes:  PERRL, EOMI, conjunctivae pink, sclerae non-icteric    HENT:  Normocephalic/Atraumatic, no rhinorrhea, mucous membranes moist   Neck: normal range of motion, no tenderness, supple   Respiratory:  No respiratory distress, normal breath sounds Cardiovascular:  Normal rate, normal rhythm  GI:  Soft, non-tender, non-distended   :  No CVAT, no flank ecchymosis  Musculoskeletal:  No swelling or edema, no tenderness, no deformities  Integument:  Petechiae diffusely over body  Pink, warm, dry, Well hydrated, no erythema, no bullae   Lymphatic:  No cervical/ tonsillar/ submandibular lymphadenopathy noted   Neurologic:  Awake, Alert & oriented x 3, CN 2-12 intact, no focal neurological deficits  Psychiatric:  Speech and behavior appropriate       ED COURSE and MDM:    Assessment/Plan   Assessment:  Clarence Winters is a 30 y o  female presents for evaluation of petechiae and thrombocytopenia  Plan:  Labs, see the management, Hematology consult, disposition as appropriate  Platelet transfusion as needed  CRITICAL CARE TIME: 0 minutes      Portions of the record may have been created with voice recognition software  Occasional wrong word or "sound a like" substitutions may have occurred due to the inherent limitations of voice recognition software       ED Provider  Electronically Signed by

## 2019-09-03 NOTE — PROGRESS NOTES
Assessment/Plan:    Patient presents with acute concern  She started with scattered rash diffusely throughout her body yesterday  Patient with petechiae noted to  B/L LE and tops of feet (areas pt shaves), lower abd, lower back, L side of chest wall, L neck and two isolated areas in mouth  Problem List Items Addressed This Visit        Other    Petechiae - Primary     - possible related to NSAID and ETOH use, however given the severity will check labs  - Discussed the patient no over-the-counter anti-inflammatory medications  - noted prior CBC - unremarkable  - may need heme/onc         Relevant Orders    Comprehensive metabolic panel (Completed)    Protime-INR (Completed)    CBC and differential        Health Maintenance Items:  - BMI Counseling: Body mass index is 44 44 kg/m²  Discussed the patient's BMI with her  The BMI is above average  BMI counseling and education was provided to the patient  Nutrition recommendations include reducing portion sizes, decreasing overall calorie intake, 3-5 servings of fruits/vegetables daily, moderation in carbohydrate intake and increasing intake of lean protein  Exercise recommendations include exercising 3-5 times per week  M*[employer] software was used to dictate this note  It may contain errors with dictating incorrect words or incorrect spelling  Please contact the provider directly with any questions  Subjective:      Patient ID: Shahla Sosa is a 30 y o  female  Rash   This is a new problem  The current episode started yesterday  The rash is diffuse (B/L LE and bikini area - areas she shaves  L chest wall, L side of neck, inside mouth, abdomen and lower back)  The rash is characterized by redness  She was exposed to nothing  Associated symptoms include diarrhea (related to anxiety about rash - typically has diarrhea with anxiety)  Pertinent negatives include no congestion, fatigue, fever, rhinorrhea, shortness of breath, sore throat or vomiting   Past treatments include nothing  The treatment provided no relief  Pt reports took aleve back over weekend as well as ETOH  The following portions of the patient's history were reviewed and updated as appropriate: allergies, current medications, past family history, past medical history, past social history, past surgical history and problem list     Review of Systems   Constitutional: Negative for chills, fatigue, fever and unexpected weight change  HENT: Negative for congestion, rhinorrhea and sore throat  Respiratory: Negative for shortness of breath  Cardiovascular: Negative for chest pain and palpitations  Gastrointestinal: Positive for diarrhea (related to anxiety about rash - typically has diarrhea with anxiety)  Negative for abdominal pain, constipation, nausea and vomiting  Genitourinary: Negative for dysuria, frequency, hematuria, vaginal bleeding and vaginal discharge  Musculoskeletal: Positive for back pain  Skin: Positive for rash  Neurological: Negative for dizziness, light-headedness and headaches  Hematological: Negative for adenopathy  Does not bruise/bleed easily  Denies bleeding gums, blood in stool, vaginal bleeding, epistaxis    Psychiatric/Behavioral: Negative for dysphoric mood  The patient is nervous/anxious  No past medical history on file        Current Outpatient Medications:     ALPRAZolam (XANAX) 0 25 mg tablet, Take 1 tablet (0 25 mg total) by mouth daily as needed for anxiety, Disp: 10 tablet, Rfl: 0    cetirizine (ZyrTEC) 10 mg tablet, Take 10 mg by mouth daily, Disp: , Rfl:     Allergies   Allergen Reactions    Pollen Extract        Social History   Past Surgical History:   Procedure Laterality Date    WISDOM TOOTH EXTRACTION       Family History   Problem Relation Age of Onset    Hypertension Father     Anxiety disorder Maternal Grandmother     Diabetes Paternal Aunt     Diabetes Paternal Uncle        Objective:  /76 (BP Location: Left arm, Patient Position: Sitting, Cuff Size: Standard)   Pulse 102   Temp 98 9 °F (37 2 °C) (Tympanic)   Ht 5' 5 35" (1 66 m) Comment: shoes off  Wt 122 kg (270 lb) Comment: shoes off  SpO2 99%   BMI 44 44 kg/m²      Physical Exam   Constitutional: She is oriented to person, place, and time  She appears well-developed and well-nourished  No distress  HENT:   Head: Normocephalic and atraumatic  Right Ear: Hearing, tympanic membrane, external ear and ear canal normal    Left Ear: Hearing, tympanic membrane, external ear and ear canal normal    Nose: Nose normal  No mucosal edema or rhinorrhea  Mouth/Throat: Uvula is midline, oropharynx is clear and moist and mucous membranes are normal  No oropharyngeal exudate  Neck: No thyromegaly present  Cardiovascular: Normal rate, regular rhythm and normal heart sounds  Pulmonary/Chest: Effort normal and breath sounds normal  No respiratory distress  She has no wheezes  Lymphadenopathy:     She has no cervical adenopathy  Neurological: She is alert and oriented to person, place, and time  Skin: Skin is warm and dry  See photo  Pt with scattered petechia to B/L LE and tops of feet (areas pt shaves), lower abd, lower back, L side of chest wall, L neck and two isolated areas in mouth   Psychiatric: She has a normal mood and affect  Her behavior is normal  Judgment and thought content normal    Nursing note and vitals reviewed

## 2019-09-03 NOTE — ASSESSMENT & PLAN NOTE
Patient presented to [facility] with 2 day history of petechiae  Platelets drawn on outpatient labs were 1 and subsequently found on admission to the ED of 0  Unknown etiology currently, likely ITP  CBC shows isolated thrombocytopenia making decreased production less likely  No risk factors for non immune mediated MAHA  Likely immune mediated destruction  Secondary causes such as infection have been ruled out (HIV, HCV neg), TSH normal   However she does admit to frequent NSAID use  ZEKE elevated with speckled pattern and titer 40  No Schistocytes on peripheral smear  No signs of active bleeding at this time  Platelets 59 today from 33 yesterday    · 2 units of irradiated platelets transfused on day of admission  · Completed Solu-Medrol 1 g IV for 3 days  · Will start on prednisone 100mg daily  · Daily CBC and BMP  · Hematology consulted: will start on prednisone and follow up as an outpatient

## 2019-09-03 NOTE — ASSESSMENT & PLAN NOTE
Patient has diffuse petechiae secondary to above throughout her body and in her oral mucosa  Unchanged from admission at this time

## 2019-09-03 NOTE — H&P
INTERNAL MEDICINE RESIDENCY ADMISSION H&P     Name: Jenna Kyle   Age & Sex: 30 y o  female   MRN: [id number]  Unit/Bed#: [facility] 924-01   Encounter: [id number]  Primary Care Provider: Christina Au DO    Code Status: Level 1 - Full Code  Admission Status: INPATIENT   Disposition: Patient requires Med/Surg    ASSESSMENT/PLAN     Principal Problem: Thrombocytopenia (HCC)  Active Problems:    Petechiae    Seasonal allergies    Other specified anxiety disorders    Morbid obesity (HCC)      * Thrombocytopenia ([address] )  Assessment & Plan  Patient presented to [facility] with 2 day history of petechiae  Platelets drawn this morning during outpatient labs was 1 and subsequently found on admission to the ED of 0  Please see HPI in H&P for discussion of secondary causes of ITP  Thus far, the cause of the thrombocytopenia has been found  EM resident spoke with Hematology who recommend Solu-Medrol 1 g, platelet transfusion, and lab work to evaluate for secondary causes  No signs of active bleeding at this time  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 setting of a normal hemoglobin and white count  · 2 units of irradiated platelets are ordered for transfusion  · Secondary causes of ITP lab work pending; nothing concerning on history  · Solu-Medrol 1 g IV  · Daily CBC and BMP  · Hematology consult      Petechiae  Assessment & Plan  Patient has diffuse petechiae secondary to above throughout her body and in her oral mucosa  Other specified anxiety disorders  Assessment & Plan  Patient takes Xanax p r n  When she is in large crowds  Last needed when she traveled on August 18th        VTE Pharmacologic Prophylaxis: Reason for no pharmacologic prophylaxis Thrombocytopenia  VTE Mechanical Prophylaxis: sequential compression device    CHIEF COMPLAINT     Chief Complaint   Patient presents with    Abnormal Lab     pt presents to ED with c/o low platelet count, pt was at PCP today and was diagnosed with critically low platelets HISTORY OF PRESENT ILLNESS     Cyn Reveles is a 78-year-old female past medical history of anxiety in large crowds and seasonal allergies who presents to [facility] for evaluation low platelets  Patient states that yesterday after she showers she noticed a rash  The next day patient saw her PCP for evaluation of increasing rash noticed throughout her body and in her mouth  Lab work done last visit revealed a platelet count of 1 the patient was subsequently sent to the ED for evaluation  In the ED, patient was found to have platelet of 0 without signs of active bleeding  Hematology was consulted and they recommended starting Solu-Medrol, evaluating for secondary causes of ITP, and platelet transfusion  Patient was questioned for secondary causes of ITP  Patient states that she has not had any recent illness, but last month had an upper respiratory infection in the beginning of the month with a sore throat, mucous production, cough  Patient was prescribed a Z-Peewee which she only took 1 dose of  Patient has not taken any new medications  Patient last took a Xanax for travel on August 18th  Patient takes Aleve for back pain with last dose on Sunday  Patient denies any chronic tonic or herbal remedies use  Patient has not had a change in her diet, and she usually eats chicken and vegetables  Patient denies any GI symptoms specifically denying any symptoms of reflux, abdominal pain, constipation, nausea/vomiting  Patient denies any epistaxis, gingival bleeding, unilateral leg swelling  Patient states her last menstrual period was on August 22nd and was normal for her which she describes as a heavy period  Patient has past medical history as discussed above and only takes Xanax p r n  And Zyrtec when her allergies are bad  Past surgical history of wisdom tooth removal     No drug allergies      Family history significant for a paternal cousin with TTP and father with iron deficiency anemia and vitiligo  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 and works as an EHS coordinator  Patient has never used tobacco   Patient uses alcohol 1-2 times per year  Patient has not used any illicit drugs  Patient is not participating in unsafe sex practices  REVIEW OF SYSTEMS     Review of Systems   Constitutional: Negative  HENT: Negative  Eyes: Negative  Respiratory: Negative  Cardiovascular: Negative  Gastrointestinal: Negative  Genitourinary: Negative  Musculoskeletal: Negative  Skin: Positive for rash  Petechial rash   Neurological: Positive for headaches  Patient has been having a headache when she was admitted to the hospital    Hematological: Bruises/bleeds easily  Psychiatric/Behavioral: Negative  OBJECTIVE     Vitals:    19 1603 19 1832 19 1937   BP: 167/65 136/70 129/83   BP Location: Left arm Left arm    Pulse: (!) 110 93 105   Resp: 18 18 16   Temp:   99 1 °F (37 3 °C)   SpO2: 100% 100% 98%   Weight: 122 kg (270 lb)     Height: 5' 5" (1 651 m)        Temperature:   Temp (24hrs), Av °F (37 2 °C), Min:98 9 °F (37 2 °C), Max:99 1 °F (37 3 °C)    Temperature: 99 1 °F (37 3 °C)  Intake & Output:  I/O     None        Weights:   IBW: 57 kg    Body mass index is 44 93 kg/m²  Weight (last 2 days)     Date/Time   Weight    19 1603   122 (270)            Physical Exam   Constitutional: She is oriented to person, place, and time  Lying in bed   HENT:   Patient has multiple petechial in oropharynx   Eyes: Right eye exhibits no discharge  Left eye exhibits no discharge  No scleral icterus  Cardiovascular: Normal rate and regular rhythm  Pulmonary/Chest: Effort normal and breath sounds normal    Abdominal: Soft  Difficult to assess for hepatomegaly or splenomegaly due to patient's body habitus  Nontender to palpation   Musculoskeletal: She exhibits no edema  Neurological: She is alert and oriented to person, place, and time   No cranial nerve deficit  Skin: Skin is dry  Rash noted  Patient had diffuse petechial rash on lower extremity, abdomen, and chest   As noted above has petechiae in her oropharynx   Psychiatric: She has a normal mood and affect  Her behavior is normal    Vitals reviewed  PAST MEDICAL HISTORY     Past Medical History:   Diagnosis Date    Anxiety      PAST SURGICAL HISTORY     Past Surgical History:   Procedure Laterality Date    WISDOM TOOTH EXTRACTION       SOCIAL & FAMILY HISTORY     Social History     Substance and Sexual Activity   Alcohol Use Yes    Comment: rare       Social History     Substance and Sexual Activity   Drug Use Never     Social History     Tobacco Use   Smoking Status Never Smoker   Smokeless Tobacco Never Used     Family History   Problem Relation Age of Onset    Hypertension Father     Anxiety disorder Maternal Grandmother     Diabetes Paternal Aunt     Diabetes Paternal Uncle      LABORATORY DATA     Labs: I have personally reviewed pertinent reports  Results from last 7 days   Lab Units 09/03/19  1646 09/03/19  0942   WBC Thousand/uL 5 35 7 67   HEMOGLOBIN g/dL 14 0 14 3   HEMATOCRIT % 41 8 44 1   PLATELETS Thousands/uL 0* 1*   NEUTROS PCT % 69 76*   MONOS PCT % 8 7      Results from last 7 days   Lab Units 09/03/19  1646 09/03/19  0942   POTASSIUM mmol/L 3 6 4 5   CHLORIDE mmol/L 106 108   CO2 mmol/L 27 27   BUN mg/dL 12 12   CREATININE mg/dL 0 75 0 78   CALCIUM mg/dL 9 6 9 9   ALK PHOS U/L  --  87   ALT U/L  --  30   AST U/L  --  15              Results from last 7 days   Lab Units 09/03/19  1646 09/03/19  0942   INR  1 01 1 01   PTT seconds 30  --              Micro:  No results found for: BLOODCX, Arvil Adeline, WOUNDCULT, SPUTUMCULTUR  IMAGING & DIAGNOSTIC TESTS     Imaging: I have personally reviewed pertinent reports  No results found  EKG, Pathology, and Other Studies: I have personally reviewed pertinent reports       ALLERGIES     Allergies   Allergen Reactions    Pollen Extract MEDICATIONS PRIOR TO ARRIVAL     Prior to Admission medications    Medication Sig Start Date End Date Taking? Authorizing Provider   ALPRAZolam Arsenio Reed) 0 25 mg tablet Take 1 tablet (0 25 mg total) by mouth daily as needed for anxiety 7/1/19  Yes Rachel Rubi, DO   Multiple Vitamin (MULTIVITAMIN) tablet Take 1 tablet by mouth daily   Yes Historical Provider, MD   cetirizine (ZyrTEC) 10 mg tablet Take 10 mg by mouth daily    Historical Provider, MD     MEDICATIONS ADMINISTERED IN LAST 24 HOURS     CURRENT MEDICATIONS       Current Facility-Administered Medications:  acetaminophen 650 mg Oral Q6H PRN Pat Toussaint MD   methylPREDNISolone sodium succinate 1,000 mg Intravenous Once Joey Hernandez, DO          acetaminophen 650 mg Q6H PRN       Admission Time  I spent 1 hour admitting the patient  This involved direct patient contact where I performed a full history and physical, reviewing previous records, and reviewing laboratory and other diagnostic studies  Portions of the record may have been created with voice recognition software  Occasional wrong word or "sound a like" substitutions may have occurred due to the inherent limitations of voice recognition software    Read the chart carefully and recognize, using context, where substitutions have occurred     ==  Arvind Mercedes MD  [facility]  Internal Medicine Residency PGY-1

## 2019-09-03 NOTE — ASSESSMENT & PLAN NOTE
Patient takes Xanax p r n  When she is in large crowds  Last needed when she traveled on August 18th    Less anxious today on exam  - Xanax 0 25mg prn daily  - will continue to monitor

## 2019-09-03 NOTE — ASSESSMENT & PLAN NOTE
- possible related to NSAID and ETOH use, however given the severity will check labs  - Discussed the patient no over-the-counter anti-inflammatory medications    - noted prior CBC - unremarkable  - may need heme/onc

## 2019-09-04 LAB
ABO GROUP BLD BPU: NORMAL
ABO GROUP BLD BPU: NORMAL
ANA HOMOGEN SER QL IF: NORMAL
ANA HOMOGEN TITR SER: NORMAL {TITER}
ANION GAP SERPL CALCULATED.3IONS-SCNC: 7 MMOL/L (ref 4–13)
BASOPHILS # BLD AUTO: 0.01 THOUSANDS/ΜL (ref 0–0.1)
BASOPHILS NFR BLD AUTO: 0 % (ref 0–1)
BPU ID: NORMAL
BPU ID: NORMAL
BUN SERPL-MCNC: 12 MG/DL (ref 5–25)
CALCIUM SERPL-MCNC: 9.6 MG/DL (ref 8.3–10.1)
CHLORIDE SERPL-SCNC: 106 MMOL/L (ref 100–108)
CO2 SERPL-SCNC: 24 MMOL/L (ref 21–32)
CREAT SERPL-MCNC: 0.74 MG/DL (ref 0.6–1.3)
EOSINOPHIL # BLD AUTO: 0 THOUSAND/ΜL (ref 0–0.61)
EOSINOPHIL NFR BLD AUTO: 0 % (ref 0–6)
ERYTHROCYTE [DISTWIDTH] IN BLOOD BY AUTOMATED COUNT: 13.1 % (ref 11.6–15.1)
GFR SERPL CREATININE-BSD FRML MDRD: 112 ML/MIN/1.73SQ M
GLUCOSE SERPL-MCNC: 144 MG/DL (ref 65–140)
HAV IGM SER QL: NORMAL
HBV CORE IGM SER QL: NORMAL
HBV SURFACE AG SER QL: NORMAL
HCT VFR BLD AUTO: 41.9 % (ref 34.8–46.1)
HCV AB SER QL: NORMAL
HGB BLD-MCNC: 13.8 G/DL (ref 11.5–15.4)
IMM GRANULOCYTES # BLD AUTO: 0.07 THOUSAND/UL (ref 0–0.2)
IMM GRANULOCYTES NFR BLD AUTO: 1 % (ref 0–2)
LYMPHOCYTES # BLD AUTO: 0.61 THOUSANDS/ΜL (ref 0.6–4.47)
LYMPHOCYTES NFR BLD AUTO: 9 % (ref 14–44)
MCH RBC QN AUTO: 27.1 PG (ref 26.8–34.3)
MCHC RBC AUTO-ENTMCNC: 32.9 G/DL (ref 31.4–37.4)
MCV RBC AUTO: 82 FL (ref 82–98)
MONOCYTES # BLD AUTO: 0.07 THOUSAND/ΜL (ref 0.17–1.22)
MONOCYTES NFR BLD AUTO: 1 % (ref 4–12)
NEUTROPHILS # BLD AUTO: 6.07 THOUSANDS/ΜL (ref 1.85–7.62)
NEUTS SEG NFR BLD AUTO: 89 % (ref 43–75)
NRBC BLD AUTO-RTO: 0 /100 WBCS
PLATELET # BLD AUTO: 31 THOUSANDS/UL (ref 149–390)
PMV BLD AUTO: 11 FL (ref 8.9–12.7)
POTASSIUM SERPL-SCNC: 4 MMOL/L (ref 3.5–5.3)
RBC # BLD AUTO: 5.09 MILLION/UL (ref 3.81–5.12)
RYE IGE QN: POSITIVE
SODIUM SERPL-SCNC: 137 MMOL/L (ref 136–145)
UNIT DISPENSE STATUS: NORMAL
UNIT DISPENSE STATUS: NORMAL
UNIT PRODUCT CODE: NORMAL
UNIT PRODUCT CODE: NORMAL
UNIT RH: NORMAL
UNIT RH: NORMAL
WBC # BLD AUTO: 6.83 THOUSAND/UL (ref 4.31–10.16)

## 2019-09-04 PROCEDURE — 99223 1ST HOSP IP/OBS HIGH 75: CPT | Performed by: INTERNAL MEDICINE

## 2019-09-04 PROCEDURE — 99222 1ST HOSP IP/OBS MODERATE 55: CPT | Performed by: INTERNAL MEDICINE

## 2019-09-04 PROCEDURE — 80048 BASIC METABOLIC PNL TOTAL CA: CPT | Performed by: STUDENT IN AN ORGANIZED HEALTH CARE EDUCATION/TRAINING PROGRAM

## 2019-09-04 PROCEDURE — NC001 PR NO CHARGE: Performed by: INTERNAL MEDICINE

## 2019-09-04 PROCEDURE — 85025 COMPLETE CBC W/AUTO DIFF WBC: CPT | Performed by: STUDENT IN AN ORGANIZED HEALTH CARE EDUCATION/TRAINING PROGRAM

## 2019-09-04 RX ADMIN — SODIUM CHLORIDE 1000 MG: 0.9 INJECTION, SOLUTION INTRAVENOUS at 11:28

## 2019-09-04 NOTE — MALNUTRITION/BMI
This medical record reflects one or more clinical indicators suggestive of morbid obesity  Malnutrition Findings:              BMI Findings:  BMI Classifications: Morbid Obesity 40-44 9     Body mass index is 44 93 kg/m²  See Nutrition note dated 9/4/19 for additional details  Completed nutrition assessment is viewable in the nutrition documentation

## 2019-09-04 NOTE — PROGRESS NOTES
INTERNAL MEDICINE RESIDENCY PROGRESS NOTE     Name: Elke Rosales   Age & Sex: 30 y o  female   MRN: [id number]  Unit/Bed#: [facility] 924-01   Encounter: [id number]  Team: SOD Team C     PATIENT INFORMATION     Name: Elke Rosales   Age & Sex: 30 y o  female   MRN: [id number]  Hospital Stay Days: 1    ASSESSMENT/PLAN     Principal Problem: Thrombocytopenia ([facility] [address] )  Active Problems:    Seasonal allergies    Other specified anxiety disorders    Morbid obesity (HCC)    Petechiae      * Thrombocytopenia ([facility] [address] )  Assessment & Plan  Patient presented to [facility] with 2 day history of petechiae  Platelets drawn this morning during outpatient labs was 1 and subsequently found on admission to the ED of 0  Unknown etiology currently, likely ITP  CBC shows isolated thrombocytopenia making decreased production less likely  No risk factors for non immune mediated MAHA  Likely immune mediated destruction  Secondary causes such as infection have been ruled out (HIV, HCV neg), no recent new medication use, TSH normal, ZEKE pending  No Schistocytes on peripheral smear  No signs of active bleeding at this time  · 2 units of irradiated platelets transfused overnight  · Solu-Medrol 1 g IV given overnight  · Daily CBC and BMP  · Hematology consulted: appreciate recs      Petechiae  Assessment & Plan  Patient has diffuse petechiae secondary to above throughout her body and in her oral mucosa  Unchanged from admission at this time  Other specified anxiety disorders  Assessment & Plan  Patient takes Xanax p r n  When she is in large crowds  Last needed when she traveled on August 18th  Disposition: continue inpatient    SUBJECTIVE     Patient seen and examined  She received 2 units irradiated platelets overnight  She is feeling well this morning  Parents were in the room on my exam   Some pain in her right upper quadrant which she attributes to gas possibly  She says her petechial rash is unchanged    She is concerned about her aleve use  She states she take 400mg almost daily due to back pain  Her father is concerned about her level of stress  She denies any bleeding at this time, headache, nausea, sob of breath  OBJECTIVE     Vitals:    19 0018 19 0019 19 0307 19 0748   BP: 155/92 155/92  140/84   BP Location:       Pulse: 85 83 96 98   Resp: 18 18  16   Temp: 100 °F (37 8 °C) 100 °F (37 8 °C) 99 3 °F (37 4 °C) 99 2 °F (37 3 °C)   TempSrc:       SpO2: 97%  98% 97%   Weight:       Height:          Temperature:   Temp (24hrs), Av 1 °F (37 3 °C), Min:98 6 °F (37 °C), Max:100 °F (37 8 °C)    Temperature: 99 2 °F (37 3 °C)  Intake & Output:  I/O        07 -  07 07 -  0700  07 -  0700    P  O   0     Blood  1000     IV Piggyback  250     Total Intake(mL/kg)  1250 (10 2)     Urine (mL/kg/hr)  1825     Total Output  1825     Net  -575                Weights:   IBW: 57 kg    Body mass index is 44 93 kg/m²  Weight (last 2 days)     Date/Time   Weight    19 19:37:18   122 (270)    19 1603   122 (270)            Physical Exam   Constitutional: She is oriented to person, place, and time  She appears well-developed and well-nourished  No distress  HENT:   Head: Normocephalic and atraumatic  Mouth/Throat: Oropharynx is clear and moist    No petechial rash appreciated   Cardiovascular: Normal rate, regular rhythm and normal heart sounds  Exam reveals no friction rub  No murmur heard  Pulmonary/Chest: Effort normal and breath sounds normal  No respiratory distress  She has no rales  Some diffuse inspiratory wheezes   Abdominal: Soft  Bowel sounds are normal  She exhibits no distension and no mass  There is no tenderness  There is no guarding  Difficult to fully assess for splenomegaly due to habitus   Musculoskeletal: Normal range of motion  She exhibits no edema or deformity     Neurological: She is alert and oriented to person, place, and time    Skin: Skin is warm and dry  Rash noted  She is not diaphoretic  No pallor  Petechial rash on L shoulder   Psychiatric: She has a normal mood and affect  Her behavior is normal  Thought content normal    Vitals reviewed  LABORATORY DATA     Labs: I have personally reviewed pertinent reports  Results from last 7 days   Lab Units 09/04/19  0546 09/03/19  1646 09/03/19  0942   WBC Thousand/uL 6 83 5 35 7 67   HEMOGLOBIN g/dL 13 8 14 0 14 3   HEMATOCRIT % 41 9 41 8 44 1   PLATELETS Thousands/uL 31* 0* 1*   NEUTROS PCT % 89* 69 76*   MONOS PCT % 1* 8 7      Results from last 7 days   Lab Units 09/04/19  0546 09/03/19  1646 09/03/19  0942   POTASSIUM mmol/L 4 0 3 6 4 5   CHLORIDE mmol/L 106 106 108   CO2 mmol/L 24 27 27   BUN mg/dL 12 12 12   CREATININE mg/dL 0 74 0 75 0 78   CALCIUM mg/dL 9 6 9 6 9 9   ALK PHOS U/L  --   --  87   ALT U/L  --   --  30   AST U/L  --   --  15              Results from last 7 days   Lab Units 09/03/19  1646 09/03/19  0942   INR  1 01 1 01   PTT seconds 30  --                IMAGING & DIAGNOSTIC TESTING     Radiology Results: I have personally reviewed pertinent reports  No results found  Other Diagnostic Testing: I have personally reviewed pertinent reports  ACTIVE MEDICATIONS     Current Facility-Administered Medications   Medication Dose Route Frequency    acetaminophen (TYLENOL) tablet 650 mg  650 mg Oral Q6H PRN    diphenhydrAMINE (BENADRYL) tablet 25 mg  25 mg Oral Q6H PRN       VTE Pharmacologic Prophylaxis: Reason for no pharmacologic prophylaxis thrombocytopenia  VTE Mechanical Prophylaxis: sequential compression device    Portions of the record may have been created with voice recognition software  Occasional wrong word or "sound a like" substitutions may have occurred due to the inherent limitations of voice recognition software    Read the chart carefully and recognize, using context, where substitutions have occurred   ==  MD Nils Jiang [address] [facility]  Internal Medicine Residency PGY-1

## 2019-09-04 NOTE — SOCIAL WORK
Met with pt and her parents Yvonneleonardfadumo Dominguez and Yamileth Duval at bedside to discuss role of CM and to discuss any needs pt may have prior to d/c  Pt lives alone in a 3rd floor apartment  3 flights of KEIRA with a total of approximately 30 steps  Pt performed ADLs independently pta  Pt ambulates independently  No DME  No hx HHC/VNA/STR  Pt is employed  Pt drives  Pt is treated for anxiety by her PCP  No IP admissions  Pt denies hx of D&A tx  Pt PCP is Dr Riley Huff  Pt preferred pharmacy is [facility] on [address] in [place]  Contact: Lizzy Mckoy (mother) [phone] or Eh Marquis (father) [phone]  No POA or Living Will  Pt family will transport home at time of d/c      CM reviewed d/c planning process including the following: identifying help at home, patient preference for d/c planning needs, Discharge Lounge, [facility] Meds to Bed program, availability of treatment team to discuss questions or concerns patient and/or family may have regarding understanding medications and recognizing signs and symptoms once discharged  CM also encouraged patient to follow up with all recommended appointments after discharge  Patient advised of importance for patient and family to participate in managing patients medical well being  Patient/caregiver received discharge checklist   Content reviewed  Patient/caregiver encouraged to participate in discharge plan of care prior to discharge home

## 2019-09-04 NOTE — PROGRESS NOTES
Progress Note - Constance Engle 30 y o  female MRN: [id number]    [facility] Encounter: [id number]      Assessment:  Principal Problem: Thrombocytopenia ([place] [address] )  Active Problems:    Petechiae    Seasonal allergies    Other specified anxiety disorders    Morbid obesity ([place] [address] )    Plan: Thrombocytopenia:  - Pt presented to [facility] yesterday with a history of petechiae starting on Labor Day  Pt had labs drawn in an outpatient lab yesterday and was found to have a platelet count of 1  Pt was advised to go to the ED, where platelet count was 0  EM resident spoke to hematology who recommended Solu-Medrol 1 g IV overnight, platelet transfusion, and lab work to evaluate for secondary causes  Pt was also advised to stop NSAID use  The last time she used Aleve was Sunday (9/1)  Labs show:  - Platelet count 0 --> 31  - WBC = 6 83  - No schistocytes or helmet cells  - Sed rate = 22  - Protime (12 9) and INR (1 01)  - Negative for Hep A, B, C, HIV   - Daily CBC and BMP    Petechiae :  - Pt has diffuse petechiae: B/L LE, bikini area (where she shaves), abdomen, lower back, L chest wall, L neck, and inside the mouth  - Petechiae in mouth has improved, but otherwise no changes    Other specified anxiety disorders:  - Pt takes Xanax prn as needed  Last needed when she traveled on 8/18    Subjective:   - Pt appears well and oriented  Parents were in the room with her  She denies fevers, chills, N/V, SOB, chest pain and bleeding  She states she has pain in her RUQ, which she's not sure if it's due to gas, anxiety, or hunger  She also has pain on her right shoulder, which might be from sleep positioning  Objective:     Vitals: Blood pressure 140/84, pulse 98, temperature 99 2 °F (37 3 °C), resp  rate 16, height 5' 5" (1 651 m), weight 122 kg (270 lb), SpO2 97 %  ,Body mass index is 44 93 kg/m²        Intake/Output Summary (Last 24 hours) at 9/4/2019 7191  Last data filed at 9/4/2019 0634  Gross per 24 hour Intake 1250 ml   Output 1825 ml   Net -575 ml       Physical Exam:   Constitutional: She is oriented to person, place, and time  She appears well-developed and well-nourished  No distress  HENT:   Head: Normocephalic and atraumatic  Mouth/Throat: Oropharynx is clear and moist    Cardiovascular: Normal rate, regular rhythm and normal heart sounds  Exam reveals no friction rub  No murmur heard  Pulmonary/Chest: Effort normal and breath sounds normal  No respiratory distress  She has no rales  Abdominal: Soft  Bowel sounds are normal  She exhibits no distension and no mass  There is no tenderness  There is no guarding  Difficult to fully assess for splenomegaly due to habitus   Musculoskeletal: Normal range of motion  She exhibits no edema or deformity  Neurological: She is alert and oriented to person, place, and time  Skin: Skin is warm and dry  Rash noted  She is not diaphoretic  No pallor  Petechial rash on L shoulder   Psychiatric: She has a normal mood and affect  Her behavior is normal  Thought content normal    Vitals reviewed  Invasive Devices     Peripheral Intravenous Line            Peripheral IV 09/03/19 Left Antecubital less than 1 day                Lab, Imaging and other studies: I have personally reviewed pertinent reports      VTE Pharmacologic Prophylaxis: Reason for no pharmacologic prophylaxis Thrombocytopenia  VTE Mechanical Prophylaxis: Ambulatory

## 2019-09-04 NOTE — PLAN OF CARE
Problem: Potential for Falls  Goal: Patient will remain free of falls  Description  INTERVENTIONS:  - Assess patient frequently for physical needs  -  Identify cognitive and physical deficits and behaviors that affect risk of falls    -  [place] fall precautions as indicated by assessment   - Educate patient/family on patient safety including physical limitations  - Instruct patient to call for assistance with activity based on assessment  - Modify environment to reduce risk of injury  - Consider OT/PT consult to assist with strengthening/mobility  Outcome: Progressing     Problem: INFECTION - ADULT  Goal: Absence or prevention of progression during hospitalization  Description  INTERVENTIONS:  - Assess and monitor for signs and symptoms of infection  - Monitor lab/diagnostic results  - Monitor all insertion sites, i e  indwelling lines, tubes, and drains  - Monitor endotracheal if appropriate and nasal secretions for changes in amount and color  - [place] appropriate cooling/warming therapies per order  - Administer medications as ordered  - Instruct and encourage patient and family to use good hand hygiene technique  - Identify and instruct in appropriate isolation precautions for identified infection/condition  Outcome: Progressing  Goal: Absence of fever/infection during neutropenic period  Description  INTERVENTIONS:  - Monitor WBC    Outcome: Progressing     Problem: SAFETY ADULT  Goal: Maintain or return to baseline ADL function  Description  INTERVENTIONS:  -  Assess patient's ability to carry out ADLs; assess patient's baseline for ADL function and identify physical deficits which impact ability to perform ADLs (bathing, care of mouth/teeth, toileting, grooming, dressing, etc )  - Assess/evaluate cause of self-care deficits   - Assess range of motion  - Assess patient's mobility; develop plan if impaired  - Assess patient's need for assistive devices and provide as appropriate  - Encourage maximum independence but intervene and supervise when necessary  - Involve family in performance of ADLs  - Assess for home care needs following discharge   - Consider OT consult to assist with ADL evaluation and planning for discharge  - Provide patient education as appropriate  Outcome: Progressing  Goal: Maintain or return mobility status to optimal level  Description  INTERVENTIONS:  - Assess patient's baseline mobility status (ambulation, transfers, stairs, etc )    - Identify cognitive and physical deficits and behaviors that affect mobility  - Identify mobility aids required to assist with transfers and/or ambulation (gait belt, sit-to-stand, lift, walker, cane, etc )  - [place] fall precautions as indicated by assessment  - Record patient progress and toleration of activity level on Mobility SBAR; progress patient to next Phase/Stage  - Instruct patient to call for assistance with activity based on assessment  - Consider rehabilitation consult to assist with strengthening/weightbearing, etc   Outcome: Progressing     Problem: Knowledge Deficit  Goal: Patient/family/caregiver demonstrates understanding of disease process, treatment plan, medications, and discharge instructions  Description  Complete learning assessment and assess knowledge base    Interventions:  - Provide teaching at level of understanding  - Provide teaching via preferred learning methods  Outcome: Progressing

## 2019-09-04 NOTE — UTILIZATION REVIEW
Initial Clinical Review    Admission: Date/Time/Statement: Inpatient Admission Orders (From admission, onward)     Ordered        09/03/19 1830  Inpatient Admission  Once                   Orders Placed This Encounter   Procedures    Inpatient Admission     Standing Status:   Standing     Number of Occurrences:   1     Order Specific Question:   Admitting Physician     Answer:   Mookie Grier [[id number]]     Order Specific Question:   Level of Care     Answer:   Med Surg [[id number]]     Order Specific Question:   Estimated length of stay     Answer:   More than 2 Midnights     Order Specific Question:   Certification     Answer:   I certify that inpatient services are medically necessary for this patient for a duration of greater than two midnights  See H&P and MD Progress Notes for additional information about the patient's course of treatment  ED Arrival Information     Expected Arrival Acuity Means of Arrival Escorted By Service Admission Type    9/3/2019  9/3/2019 15:20 Emergent Walk-In Family Member General Medicine Emergency    Arrival Complaint    Low Blood Platelets        Chief Complaint   Patient presents with    Abnormal Lab     pt presents to ED with c/o low platelet count, pt was at PCP today and was diagnosed with critically low platelets     Assessment/Plan:   MS POOL IS A 25 YO FEMALE WHO PRESENTS TO THE ED AT THE REQUEST OF HER PCP FOR EVALUATION OF RASH - PETECHIAE  ON HER BODY AND IN MOUTH AND LOW PLATELET COUNT OF 1 FROM RECENT BLOODWORK  IN ED PLATELET COUNT WAS 0 WITHOUT ANY SIGNS OF ACTIVE BLEEDING  HEMETOLOGY CONSULT DONE AND RECOMMENDED STARTING IV SOLU-MEDROL, PLATELET TRANSFUSION AND EVALUATION OF 2ND CAUSES OF ITP  FAMILY H/O TTP AND IRON DEF ANEMIA AND VITILIGO  SHE IS ADMITTED TO INPATIENT STATUS WITH  THROMBOCYTOPENIA - TRANSFUSE 2 U IRRADIATED PLATELETS, IV SOLU-MEDROL 1 GM, DAILY CBC, BMP, HEMATOLOGY CONSULT  PETECHIAE - REMAINS ON BODY AND IN MOUTH   PMH: ANXIETY DISORDER  ED Triage Vitals   Temperature Pulse Respirations Blood Pressure SpO2   09/03/19 1937 09/03/19 1603 09/03/19 1603 09/03/19 1603 09/03/19 1603   99 1 °F (37 3 °C) (!) 110 18 167/65 100 %      Temp Source Heart Rate Source Patient Position - Orthostatic VS BP Location FiO2 (%)   09/03/19 2331 09/03/19 1603 09/03/19 1603 09/03/19 1603 --   Oral Monitor Lying Left arm       Pain Score       09/03/19 1603       No Pain        Wt Readings from Last 1 Encounters:   09/03/19 122 kg (270 lb)     Additional Vital Signs:   09/04/19 07:48:46  99 2 °F (37 3 °C)  98  16  140/84  103  97 %  --   09/04/19 03:07:54  99 3 °F (37 4 °C)  96  --  --  --  98 %  --   09/04/19 0019  100 °F (37 8 °C)  83  18  155/92  --  --  --   09/04/19 00:18:38  100 °F (37 8 °C)  85  18  155/92  113  97 %  --   09/03/19 23:48:45  99 °F (37 2 °C)  80  --  156/91  113  96 %  --   09/03/19 2331  98 6 °F (37 °C)  102  14  146/91  --  --  --   09/03/19 23:09:35  98 6 °F (37 °C)  102  14  146/91  109  98 %  --   09/03/19 2044  99 °F (37 2 °C)  98  18  139/82  --  --  --   09/03/19 20:41:48  99 °F (37 2 °C)  108Abnormal   18  139/82  101  99 %  --   09/03/19 20:25:43  98 7 °F (37 1 °C)  98  16  135/84  101  99 %  --   09/03/19 19:37:18  99 1 °F (37 3 °C)  105  16  129/83  98  98 %  --   09/03/19 1832  --  93  18  136/70  --  100 %  None (Room air)     Pertinent Labs/Diagnostic Test Results:     Results from last 7 days   Lab Units 09/04/19  0546 09/03/19  1646 09/03/19  0942   WBC Thousand/uL 6 83 5 35 7 67   HEMOGLOBIN g/dL 13 8 14 0 14 3   HEMATOCRIT % 41 9 41 8 44 1   PLATELETS Thousands/uL 31* 0* 1*   NEUTROS ABS Thousands/µL 6 07 3 68 5 99     Results from last 7 days   Lab Units 09/04/19  0546 09/03/19  1646 09/03/19  0942   SODIUM mmol/L 137 140 142   POTASSIUM mmol/L 4 0 3 6 4 5   CHLORIDE mmol/L 106 106 108   CO2 mmol/L 24 27 27   ANION GAP mmol/L 7 7 7   BUN mg/dL 12 12 12   CREATININE mg/dL 0 74 0 75 0 78   EGFR ml/min/1 73sq m 112 110 105 CALCIUM mg/dL 9 6 9 6 9 9     Results from last 7 days   Lab Units 09/03/19  0942   AST U/L 15   ALT U/L 30   ALK PHOS U/L 87   TOTAL PROTEIN g/dL 7 8   ALBUMIN g/dL 4 3   TOTAL BILIRUBIN mg/dL 0 44     Results from last 7 days   Lab Units 09/04/19  0546 09/03/19  1646   GLUCOSE RANDOM mg/dL 144* 138     Results from last 7 days   Lab Units 09/03/19  1646 09/03/19  0942   PROTIME seconds 12 9 12 9   INR  1 01 1 01   PTT seconds 30  --      Results from last 7 days   Lab Units 09/03/19  1646   TSH 3RD GENERATON uIU/mL 1 470     Results from last 7 days   Lab Units 09/03/19  1646   HEP B S AG  Non-reactive   HEP C AB  Non-reactive   HEP B C IGM  Non-reactive         Results from last 7 days   Lab Units 09/03/19  1646   SED RATE mm/hour 22*     ED Treatment:   Medication Administration from 09/03/2019 1453 to 09/03/2019 1916     None        Past Medical History:   Diagnosis Date    Anxiety      Present on Admission:   Thrombocytopenia (HCC)   Petechiae   Seasonal allergies   Other specified anxiety disorders   Morbid obesity ([facility] [address] )    Admitting Diagnosis: Thrombocytopenia ([facility] [address] ) [D69 6]  Acute ITP (HCC) [D69 3]  Abnormal blood chemistry test [R79 9]     Age/Sex: 30 y o  female     Admission Orders:    Current Facility-Administered Medications:  acetaminophen 650 mg Oral Q6H PRN    diphenhydrAMINE 25 mg Oral Q6H PRN X1 9/3   methylPREDNISolone sodium succinate 1,000 mg Intravenous Daily      TRANSFUSION COMPLETED  ACTIVITY AS OLIVIER   ZEKE TITER  NO ASA, NSAIDS OR IM INJECTIONS  REGULAR DIET   IP CONSULT TO CASE MANAGEMENT  IP CONSULT TO HEMATOLOGY    Network Utilization Review Department  Phone: [phone]; Fax [phone]  [email or web address]  ATTENTION: Please call with any questions or concerns to [phone]  and carefully listen to the prompts so that you are directed to the right person     Send all requests for admission clinical reviews, approved or denied determinations and any other requests to fax [phone]   All voicemails are confidential

## 2019-09-04 NOTE — CONSULTS
Consultation - Medical Oncology   Ludmila Cleveland 30 y o  female MRN: [id number]  Unit/Bed#: PPHP 924-01 Encounter: [id number]    Referring physician:KATIE  Reason for Consult:  Acute and profound thrombocytopenia  HPI: Ludmila Cleveland is a 30y o  year old female   Patient's parents are in the room  On 09/02/2019 patient noticed petechiae on her upper chest   She had blood test and platelet count was 9342  She was sent to the emergency room yesterday and platelet count was 0  She was found to have petechiae on her lower legs and upper chest   No active bleeding  She received 2 bags of platelets and 1 g of Solu-Medrol  Platelet count is 56236 today  She had URI about a month ago and had Z-Peewee  No previous history of bleeding problem other than regular menstrual blood loss  No previous significant medical history other than anxiety, seasonal allergies and she is overweight  No recent new medication   ROS:  09/04/19 Reviewed 13 systems:  Presently no headaches, seizures, dizziness, diplopia, dysphagia, hoarseness, chest pain, palpitations, shortness of breath, cough, hemoptysis, abdominal pain, nausea, vomiting, change in bowel habits, melena, hematuria, fever, chills, active bleeding, bone pains, skin rash other than petechiae, weight loss, arthritic symptoms,  tiredness , weakness, numbness, claudication and gait problem  No frequent infections  Not unusually sensitive to heat or cold  No swelling of the ankles  No swollen glands  Patient is anxious   Other symptoms are in HPI        Historical Information   Past Medical History:   Diagnosis Date    Anxiety      Past Surgical History:   Procedure Laterality Date    WISDOM TOOTH EXTRACTION       Social History   Social History     Substance and Sexual Activity   Alcohol Use Yes    Comment: rare     Social History     Substance and Sexual Activity   Drug Use Never     Social History     Tobacco Use   Smoking Status Never Smoker   Smokeless Tobacco Never Used Family History:   Family History   Problem Relation Age of Onset    Hypertension Father     Anxiety disorder Maternal Grandmother     Diabetes Paternal Aunt     Diabetes Paternal Uncle          Current Facility-Administered Medications:     acetaminophen (TYLENOL) tablet 650 mg, 650 mg, Oral, Q6H PRN, Raghav Gauthier MD    diphenhydrAMINE (BENADRYL) tablet 25 mg, 25 mg, Oral, Q6H PRN, Crys Moser MD, 25 mg at 09/03/19 2110    Allergies   Allergen Reactions    Pollen Extract      @ ROS@  Physical Exam:  Vitals:    09/04/19 0018 09/04/19 0019 09/04/19 0307 09/04/19 0748   BP: 155/92 155/92  140/84   BP Location:       Pulse: 85 83 96 98   Resp: 18 18  16   Temp: 100 °F (37 8 °C) 100 °F (37 8 °C) 99 3 °F (37 4 °C) 99 2 °F (37 3 °C)   TempSrc:       SpO2: 97%  98% 97%   Weight:       Height:         Alert, oriented, not in distress, no icterus, no oral thrush, no palpable neck mass, clear lung fields, regular heart rate, abdomen  soft and non tender, no palpable abdominal mass, no ascites, no edema of ankles, no calf tenderness, no focal neurological deficit, no skin rash but petechiae on upper chest and lower legs and few on her face, no palpable lymphadenopathy in the neck and axillary areas, good arterial pulses, no clubbing  Patient is anxious  Performance status 1  Lab Results: I have reviewed all pertinent labs    LABS:  Results for orders placed or performed during the hospital encounter of 09/03/19   TSH, 3rd generation with Free T4 reflex   Result Value Ref Range    TSH 3RD GENERATON 1 470 0 358 - 3 740 uIU/mL   CBC and differential   Result Value Ref Range    WBC 5 35 4 31 - 10 16 Thousand/uL    RBC 5 03 3 81 - 5 12 Million/uL    Hemoglobin 14 0 11 5 - 15 4 g/dL    Hematocrit 41 8 34 8 - 46 1 %    MCV 83 82 - 98 fL    MCH 27 8 26 8 - 34 3 pg    MCHC 33 5 31 4 - 37 4 g/dL    RDW 13 2 11 6 - 15 1 %    Platelets 0 (LL) 461 - 390 Thousands/uL    nRBC 0 /100 WBCs    Neutrophils Relative 69 43 - 75 %    Immat GRANS % 0 0 - 2 %    Lymphocytes Relative 22 14 - 44 %    Monocytes Relative 8 4 - 12 %    Eosinophils Relative 1 0 - 6 %    Basophils Relative 0 0 - 1 %    Neutrophils Absolute 3 68 1 85 - 7 62 Thousands/µL    Immature Grans Absolute 0 02 0 00 - 0 20 Thousand/uL    Lymphocytes Absolute 1 19 0 60 - 4 47 Thousands/µL    Monocytes Absolute 0 41 0 17 - 1 22 Thousand/µL    Eosinophils Absolute 0 03 0 00 - 0 61 Thousand/µL    Basophils Absolute 0 02 0 00 - 0 10 Thousands/µL   Hepatitis panel, acute   Result Value Ref Range    Hepatitis B Surface Ag Non-reactive Non-reactive, NonReactive - Confirmed    Hep A IgM Non-reactive Non-reactive, Equivocal-Suggest Recollect    Hepatitis C Ab Non-reactive Non-reactive    Hep B C IgM Non-reactive Non-reactive   Rapid HIV 1/2 AB-AG Combo   Result Value Ref Range    Rapid HIV 1 AND 2 Non-Reactive Non-Reactive    HIV-1 P24 Ag Screen Non-Reactive Non-Reactive   Basic metabolic panel   Result Value Ref Range    Sodium 140 136 - 145 mmol/L    Potassium 3 6 3 5 - 5 3 mmol/L    Chloride 106 100 - 108 mmol/L    CO2 27 21 - 32 mmol/L    ANION GAP 7 4 - 13 mmol/L    BUN 12 5 - 25 mg/dL    Creatinine 0 75 0 60 - 1 30 mg/dL    Glucose 138 65 - 140 mg/dL    Calcium 9 6 8 3 - 10 1 mg/dL    eGFR 110 ml/min/1 73sq m   Protime-INR   Result Value Ref Range    Protime 12 9 11 6 - 14 5 seconds    INR 1 01 0 84 - 1 19   APTT   Result Value Ref Range    PTT 30 23 - 37 seconds   Hemolysis Smear   Result Value Ref Range    Hemolysis Smear No Schistocytes or Helmet Cells noted    Sedimentation rate, automated   Result Value Ref Range    Sed Rate 22 (H) 0 - 20 mm/hour   CBC and differential   Result Value Ref Range    WBC 6 83 4 31 - 10 16 Thousand/uL    RBC 5 09 3 81 - 5 12 Million/uL    Hemoglobin 13 8 11 5 - 15 4 g/dL    Hematocrit 41 9 34 8 - 46 1 %    MCV 82 82 - 98 fL    MCH 27 1 26 8 - 34 3 pg    MCHC 32 9 31 4 - 37 4 g/dL    RDW 13 1 11 6 - 15 1 %    MPV 11 0 8 9 - 12 7 fL    Platelets 31 (LL) 149 - 390 Thousands/uL    nRBC 0 /100 WBCs    Neutrophils Relative 89 (H) 43 - 75 %    Immat GRANS % 1 0 - 2 %    Lymphocytes Relative 9 (L) 14 - 44 %    Monocytes Relative 1 (L) 4 - 12 %    Eosinophils Relative 0 0 - 6 %    Basophils Relative 0 0 - 1 %    Neutrophils Absolute 6 07 1 85 - 7 62 Thousands/µL    Immature Grans Absolute 0 07 0 00 - 0 20 Thousand/uL    Lymphocytes Absolute 0 61 0 60 - 4 47 Thousands/µL    Monocytes Absolute 0 07 (L) 0 17 - 1 22 Thousand/µL    Eosinophils Absolute 0 00 0 00 - 0 61 Thousand/µL    Basophils Absolute 0 01 0 00 - 0 10 Thousands/µL   Basic metabolic panel   Result Value Ref Range    Sodium 137 136 - 145 mmol/L    Potassium 4 0 3 5 - 5 3 mmol/L    Chloride 106 100 - 108 mmol/L    CO2 24 21 - 32 mmol/L    ANION GAP 7 4 - 13 mmol/L    BUN 12 5 - 25 mg/dL    Creatinine 0 74 0 60 - 1 30 mg/dL    Glucose 144 (H) 65 - 140 mg/dL    Calcium 9 6 8 3 - 10 1 mg/dL    eGFR 112 ml/min/1 73sq m   Type and screen   Result Value Ref Range    ABO Grouping AB     Rh Factor Positive     Antibody Screen Negative     Specimen Expiration Date [id number]    Prepare platelet pheresis:Has consent been obtained? Yes, 2 Units   Result Value Ref Range    Unit Product Code E5872Q10     Unit Number [id number]     Unit ABO O     Unit [facility] POS     Unit Dispense Status Presumed Trans     Unit Product Code K5693T12     Unit Number [id number]     Unit ABO O     Unit [facility] POS     Unit Dispense Status Presumed Trans          Imaging Studies: I have personally reviewed pertinent reports  Pathology, and Other Studies: I have personally reviewed pertinent reports  Assessment and Plan:  I am suspecting acute ITP  Patient could be responding to steroid  She is being continued on 2 more dose of Solu-Medrol 1 g IV and if she continued to respond she will be started on prednisone and tapered very slowly  If response is not adequate she will be tried on IVIG therapy and there are other options  Also she might need additional workup at that time to find cause of her acute ITP  Discussed this side effects to steroids, acute and chronic side effects  Patient knows to avoid trauma  No aspirin and no NSAIDs  No intramuscular injection  For any bleeding she will be reporting to the emergency room  Patient's condition and counts and chemistry to be monitored in the hospital and outside  All discussed in detail  Questions answered  Patient voiced understanding and agreement in the discussion  Counseling / Coordination of Care    Greater than 50% of total time was spent with the patient and / or family counseling and / or coordination of care

## 2019-09-05 LAB
ALBUMIN SERPL BCP-MCNC: 3.8 G/DL (ref 3.5–5)
ALP SERPL-CCNC: 85 U/L (ref 46–116)
ALT SERPL W P-5'-P-CCNC: 32 U/L (ref 12–78)
ANION GAP SERPL CALCULATED.3IONS-SCNC: 8 MMOL/L (ref 4–13)
AST SERPL W P-5'-P-CCNC: 31 U/L (ref 5–45)
BASOPHILS # BLD AUTO: 0.01 THOUSANDS/ΜL (ref 0–0.1)
BASOPHILS NFR BLD AUTO: 0 % (ref 0–1)
BILIRUB SERPL-MCNC: 0.6 MG/DL (ref 0.2–1)
BUN SERPL-MCNC: 16 MG/DL (ref 5–25)
CALCIUM SERPL-MCNC: 9.7 MG/DL (ref 8.3–10.1)
CHLORIDE SERPL-SCNC: 108 MMOL/L (ref 100–108)
CO2 SERPL-SCNC: 24 MMOL/L (ref 21–32)
CREAT SERPL-MCNC: 0.8 MG/DL (ref 0.6–1.3)
EOSINOPHIL # BLD AUTO: 0 THOUSAND/ΜL (ref 0–0.61)
EOSINOPHIL NFR BLD AUTO: 0 % (ref 0–6)
ERYTHROCYTE [DISTWIDTH] IN BLOOD BY AUTOMATED COUNT: 13.2 % (ref 11.6–15.1)
GFR SERPL CREATININE-BSD FRML MDRD: 102 ML/MIN/1.73SQ M
GLUCOSE SERPL-MCNC: 135 MG/DL (ref 65–140)
HCT VFR BLD AUTO: 40.6 % (ref 34.8–46.1)
HGB BLD-MCNC: 13.3 G/DL (ref 11.5–15.4)
IMM GRANULOCYTES # BLD AUTO: 0.11 THOUSAND/UL (ref 0–0.2)
IMM GRANULOCYTES NFR BLD AUTO: 1 % (ref 0–2)
LYMPHOCYTES # BLD AUTO: 1.09 THOUSANDS/ΜL (ref 0.6–4.47)
LYMPHOCYTES NFR BLD AUTO: 7 % (ref 14–44)
MCH RBC QN AUTO: 27.1 PG (ref 26.8–34.3)
MCHC RBC AUTO-ENTMCNC: 32.8 G/DL (ref 31.4–37.4)
MCV RBC AUTO: 83 FL (ref 82–98)
MONOCYTES # BLD AUTO: 0.64 THOUSAND/ΜL (ref 0.17–1.22)
MONOCYTES NFR BLD AUTO: 4 % (ref 4–12)
NEUTROPHILS # BLD AUTO: 13.87 THOUSANDS/ΜL (ref 1.85–7.62)
NEUTS SEG NFR BLD AUTO: 88 % (ref 43–75)
NRBC BLD AUTO-RTO: 0 /100 WBCS
PLATELET # BLD AUTO: 33 THOUSANDS/UL (ref 149–390)
PMV BLD AUTO: 13.8 FL (ref 8.9–12.7)
POTASSIUM SERPL-SCNC: 4.7 MMOL/L (ref 3.5–5.3)
PROT SERPL-MCNC: 7.8 G/DL (ref 6.4–8.2)
RBC # BLD AUTO: 4.91 MILLION/UL (ref 3.81–5.12)
SODIUM SERPL-SCNC: 140 MMOL/L (ref 136–145)
WBC # BLD AUTO: 15.72 THOUSAND/UL (ref 4.31–10.16)

## 2019-09-05 PROCEDURE — 85025 COMPLETE CBC W/AUTO DIFF WBC: CPT | Performed by: INTERNAL MEDICINE

## 2019-09-05 PROCEDURE — 99232 SBSQ HOSP IP/OBS MODERATE 35: CPT | Performed by: INTERNAL MEDICINE

## 2019-09-05 PROCEDURE — NC001 PR NO CHARGE: Performed by: INTERNAL MEDICINE

## 2019-09-05 PROCEDURE — 80053 COMPREHEN METABOLIC PANEL: CPT | Performed by: INTERNAL MEDICINE

## 2019-09-05 RX ORDER — ALPRAZOLAM 0.25 MG/1
0.25 TABLET ORAL DAILY PRN
Status: DISCONTINUED | OUTPATIENT
Start: 2019-09-05 | End: 2019-09-06 | Stop reason: HOSPADM

## 2019-09-05 RX ADMIN — SODIUM CHLORIDE 1000 MG: 0.9 INJECTION, SOLUTION INTRAVENOUS at 08:40

## 2019-09-05 NOTE — PLAN OF CARE
Problem: Potential for Falls  Goal: Patient will remain free of falls  Description  INTERVENTIONS:  - Assess patient frequently for physical needs  -  Identify cognitive and physical deficits and behaviors that affect risk of falls    -  [place] fall precautions as indicated by assessment   - Educate patient/family on patient safety including physical limitations  - Instruct patient to call for assistance with activity based on assessment  - Modify environment to reduce risk of injury  - Consider OT/PT consult to assist with strengthening/mobility  Outcome: Progressing     Problem: INFECTION - ADULT  Goal: Absence or prevention of progression during hospitalization  Description  INTERVENTIONS:  - Assess and monitor for signs and symptoms of infection  - Monitor lab/diagnostic results  - Monitor all insertion sites, i e  indwelling lines, tubes, and drains  - Monitor endotracheal if appropriate and nasal secretions for changes in amount and color  - [place] appropriate cooling/warming therapies per order  - Administer medications as ordered  - Instruct and encourage patient and family to use good hand hygiene technique  - Identify and instruct in appropriate isolation precautions for identified infection/condition  Outcome: Progressing  Goal: Absence of fever/infection during neutropenic period  Description  INTERVENTIONS:  - Monitor WBC    Outcome: Progressing     Problem: SAFETY ADULT  Goal: Maintain or return to baseline ADL function  Description  INTERVENTIONS:  -  Assess patient's ability to carry out ADLs; assess patient's baseline for ADL function and identify physical deficits which impact ability to perform ADLs (bathing, care of mouth/teeth, toileting, grooming, dressing, etc )  - Assess/evaluate cause of self-care deficits   - Assess range of motion  - Assess patient's mobility; develop plan if impaired  - Assess patient's need for assistive devices and provide as appropriate  - Encourage maximum independence but intervene and supervise when necessary  - Involve family in performance of ADLs  - Assess for home care needs following discharge   - Consider OT consult to assist with ADL evaluation and planning for discharge  - Provide patient education as appropriate  Outcome: Progressing  Goal: Maintain or return mobility status to optimal level  Description  INTERVENTIONS:  - Assess patient's baseline mobility status (ambulation, transfers, stairs, etc )    - Identify cognitive and physical deficits and behaviors that affect mobility  - Identify mobility aids required to assist with transfers and/or ambulation (gait belt, sit-to-stand, lift, walker, cane, etc )  - [place] fall precautions as indicated by assessment  - Record patient progress and toleration of activity level on Mobility SBAR; progress patient to next Phase/Stage  - Instruct patient to call for assistance with activity based on assessment  - Consider rehabilitation consult to assist with strengthening/weightbearing, etc   Outcome: Progressing     Problem: Knowledge Deficit  Goal: Patient/family/caregiver demonstrates understanding of disease process, treatment plan, medications, and discharge instructions  Description  Complete learning assessment and assess knowledge base  Interventions:  - Provide teaching at level of understanding  - Provide teaching via preferred learning methods  Outcome: Progressing     Problem: Nutrition/Hydration-ADULT  Goal: Nutrient/Hydration intake appropriate for improving, restoring or maintaining nutritional needs  Description  Monitor and assess patient's nutrition/hydration status for malnutrition  Collaborate with interdisciplinary team and initiate plan and interventions as ordered  Monitor patient's weight and dietary intake as ordered or per policy  Utilize nutrition screening tool and intervene as necessary  Determine patient's food preferences and provide high-protein, high-caloric foods as appropriate  INTERVENTIONS:  - Monitor oral intake, urinary output, labs, and treatment plans  - Assess nutrition and hydration status and recommend course of action  - Evaluate amount of meals eaten  - Assist patient with eating if necessary   - Allow adequate time for meals  - Recommend/ encourage appropriate diets, oral nutritional supplements, and vitamin/mineral supplements  - Order, calculate, and assess calorie counts as needed  - Recommend, monitor, and adjust tube feedings and TPN/PPN based on assessed needs  - Assess need for intravenous fluids  - Provide specific nutrition/hydration education as appropriate  - Include patient/family/caregiver in decisions related to nutrition  Outcome: Progressing

## 2019-09-05 NOTE — PROGRESS NOTES
Progress Note - Miranda Anand 30 y o  female MRN: [id number]    [facility] Encounter: [id number]      Assessment & Plan:  Principal Problem:    Thrombocytopenia ([facility] )  Active Problems:    Petechiae    Seasonal allergies    Other specified anxiety disorders    Morbid obesity ([facility] )    Thrombocytopenia  - Pt presented to [facility] yesterday with a history of petechiae starting on Labor Day  Pt had labs drawn in an outpatient lab 9/03 and was found to have a platelet count of 1  Pt was advised to go to the ED, where platelet count was 0  EM resident spoke to hematology who recommended Solu-Medrol 1 g IV overnight, 2 units of irradiated platelet transfusion, and lab work to evaluate for secondary causes  Pt was also advised to stop NSAID use  The last time she used Aleve was Sunday (9/1)  No schistocytes/helmet cells were found on peripheral smear and infections were ruled out as secondary causes (Hep A, B, C, HIV neg)  Platelets are 33 today from 31 yesterday  ZEKE was elevated with speckled pattern and titer 40    - Daily CBC and BMP  - Hematology was consulted: Continue 1g IV solu-medrol and if responsive, keep patient on Prednisone and taper slowly  Otherwise, proceed with IVIG  Petechiae  - Patient has diffuse petechiae: B/L LE, bikini area (where she shaves), abdomen, lower back, L chest wall, L neck, and inside the mouth  Has not changed compared to yesterday  Other specified anxiety disorders  - Pt takes Xanax prn as needed  Last needed when she traveled on 8/18  - Pt stated she felt a bit anxious today  Will continue to monitor     Subjective:   - Patient was seen and examined  There were no acute events  She admitted feeling anxious these past few days because of everything going on  She appeared flushed and demonstrated a bit of a tremor  Her appetite has decreased, which she also attributes to her anxiety   She was able to use the bathroom yesterday, but noticed her stool was pale and her urine was very yellow  She complains of a mild headache, but otherwise denies any bleeding at this time, nausea, vomiting, SOB, and pain  Objective:     Vitals: Blood pressure 143/90, pulse (!) 110, temperature 98 4 °F (36 9 °C), resp  rate 20, height 5' 5" (1 651 m), weight 122 kg (270 lb), SpO2 98 %  ,Body mass index is 44 93 kg/m²  Intake/Output Summary (Last 24 hours) at 9/5/2019 0904  Last data filed at 9/5/2019 0300  Gross per 24 hour   Intake 780 ml   Output 700 ml   Net 80 ml     Physical Exam   Constitutional: She is oriented to person, place, and time  She appears well-developed  HENT:   Head: Normocephalic  Cardiovascular: Normal rate, regular rhythm and normal heart sounds  Pulmonary/Chest: Effort normal and breath sounds normal    Abdominal: Soft  Bowel sounds are normal    Musculoskeletal:   Petechiae rash B/L LE, groin, abdomen, lower back, L chest wall, L neck, inside mouth   Neurological: She is alert and oriented to person, place, and time  Psychiatric: She has a normal mood and affect         Invasive Devices     Peripheral Intravenous Line            Peripheral IV 09/03/19 Left Antecubital 1 day              VTE Pharmacologic Prophylaxis: Reason for no pharmacologic prophylaxis Thrombocytopenia  VTE Mechanical Prophylaxis: sequential compression device

## 2019-09-05 NOTE — PROGRESS NOTES
INTERNAL MEDICINE RESIDENCY PROGRESS NOTE     Name: Kranthi Callahan   Age & Sex: 30 y o  female   MRN: [id number]  [facility]   Encounter: [id number]  Team: SOD Team C     PATIENT INFORMATION     Name: Kranthi Callahan   Age & Sex: 30 y o  female   MRN: [id number]  Hospital Stay Days: 2    ASSESSMENT/PLAN     Principal Problem: Thrombocytopenia ([address] )  Active Problems:    Seasonal allergies    Other specified anxiety disorders    Morbid obesity (HCC)    Petechiae      * Thrombocytopenia ([address] )  Assessment & Plan  Patient presented to [facility] with 2 day history of petechiae  Platelets drawn on outpatient labs were 1 and subsequently found on admission to the ED of 0  Unknown etiology currently, likely ITP  CBC shows isolated thrombocytopenia making decreased production less likely  No risk factors for non immune mediated MAHA  Likely immune mediated destruction  Secondary causes such as infection have been ruled out (HIV, HCV neg), TSH normal   However she does admit to frequent NSAID use  ZEKE elevated with speckled pattern and titer 40  No Schistocytes on peripheral smear  No signs of active bleeding at this time  Platelets 33 today from 31 yesterday  · 2 units of irradiated platelets transfused on day of admission  · Solu-Medrol 1 g IV   · Daily CBC and BMP  · Hematology consulted: will continue on IV solu-medrol and determine if response, if not will proceed with IVIG      Petechiae  Assessment & Plan  Patient has diffuse petechiae secondary to above throughout her body and in her oral mucosa  Unchanged from admission at this time  Other specified anxiety disorders  Assessment & Plan  Patient takes Xanax p r n  When she is in large crowds  Last needed when she traveled on August 18th  Was zeina on exam this morning     - will continue to monitor      Disposition: continue inpatient    SUBJECTIVE     Patient seen and examined  No acute events overnight    She is feeling shaky this morning, which she attributes to what's happening to her and being in the hospital   She says her petechial rash is unchanged  She is complaining of mild headache  She said there is no chance she could be pregnant  She denies any bleeding at this time, nausea, sob of breath  OBJECTIVE     Vitals:    19 1538 19 1913 19 2216 19 0818   BP: 148/92  149/91 143/90   Pulse: 86 88 75 (!) 110   Resp: 18 18  20   Temp: 98 5 °F (36 9 °C) 98 9 °F (37 2 °C) 98 6 °F (37 °C) 98 4 °F (36 9 °C)   TempSrc:       SpO2: 96% 96% 96% 98%   Weight:       Height:          Temperature:   Temp (24hrs), Av 6 °F (37 °C), Min:98 4 °F (36 9 °C), Max:98 9 °F (37 2 °C)    Temperature: 98 4 °F (36 9 °C)  Intake & Output:  I/O        07 -  0700  07 -  0700  07 -  0700    P  O   0     Blood  1000     IV Piggyback  250     Total Intake(mL/kg)  1250 (10 2)     Urine (mL/kg/hr)  1825     Total Output  1825     Net  -575                Weights:   IBW: 57 kg    Body mass index is 44 93 kg/m²  Weight (last 2 days)     Date/Time   Weight    19 19:37:18   122 (270)    19 1603   122 (270)            Physical Exam   Constitutional: She is oriented to person, place, and time  She appears well-developed and well-nourished  No distress  HENT:   Head: Normocephalic and atraumatic  Mouth/Throat: Oropharynx is clear and moist    petechial rash appreciated   Cardiovascular: Normal rate, regular rhythm and normal heart sounds  Exam reveals no friction rub  No murmur heard  Pulmonary/Chest: Effort normal and breath sounds normal  No respiratory distress  She has no rales  Abdominal: Soft  Bowel sounds are normal  She exhibits no distension and no mass  There is no tenderness  There is no guarding  Difficult to fully assess for splenomegaly due to habitus   Musculoskeletal: Normal range of motion  She exhibits no edema or deformity     Neurological: She is alert and oriented to person, place, and time  Skin: Skin is warm and dry  Rash noted  She is not diaphoretic  No pallor  Petechial rash on bilateral arms and legs    Psychiatric: She has a normal mood and affect  Her behavior is normal  Thought content normal    Vitals reviewed  LABORATORY DATA     Labs: I have personally reviewed pertinent reports  Results from last 7 days   Lab Units 09/05/19  0535 09/04/19  0546 09/03/19  1646   WBC Thousand/uL 15 72* 6 83 5 35   HEMOGLOBIN g/dL 13 3 13 8 14 0   HEMATOCRIT % 40 6 41 9 41 8   PLATELETS Thousands/uL 33* 31* 0*   NEUTROS PCT % 88* 89* 69   MONOS PCT % 4 1* 8      Results from last 7 days   Lab Units 09/05/19  0535 09/04/19  0546 09/03/19  1646 09/03/19  0942   POTASSIUM mmol/L 4 7 4 0 3 6 4 5   CHLORIDE mmol/L 108 106 106 108   CO2 mmol/L 24 24 27 27   BUN mg/dL 16 12 12 12   CREATININE mg/dL 0 80 0 74 0 75 0 78   CALCIUM mg/dL 9 7 9 6 9 6 9 9   ALK PHOS U/L 85  --   --  87   ALT U/L 32  --   --  30   AST U/L 31  --   --  15              Results from last 7 days   Lab Units 09/03/19  1646 09/03/19  0942   INR  1 01 1 01   PTT seconds 30  --                IMAGING & DIAGNOSTIC TESTING     Radiology Results: I have personally reviewed pertinent reports  No results found  Other Diagnostic Testing: I have personally reviewed pertinent reports  ACTIVE MEDICATIONS     Current Facility-Administered Medications   Medication Dose Route Frequency    acetaminophen (TYLENOL) tablet 650 mg  650 mg Oral Q6H PRN    diphenhydrAMINE (BENADRYL) tablet 25 mg  25 mg Oral Q6H PRN    methylPREDNISolone sodium succinate (Solu-MEDROL) 1,000 mg in sodium chloride 0 9 % 250 mL IVPB  1,000 mg Intravenous Daily       VTE Pharmacologic Prophylaxis: Reason for no pharmacologic prophylaxis thrombocytopenia  VTE Mechanical Prophylaxis: sequential compression device    Portions of the record may have been created with voice recognition software    Occasional wrong word or "sound a like" substitutions may have occurred due to the inherent limitations of voice recognition software    Read the chart carefully and recognize, using context, where substitutions have occurred   ==  Franny Melgar MD  [address]  Internal Medicine Residency PGY-1

## 2019-09-05 NOTE — PROGRESS NOTES
HPI:  Patient's father , uncle and aunt are in the room  Elke Rosales is a 30 y o  female with acute adult ITP, responding to steroid  Platelet count 59075 and she will have 3rd dose of high-dose Solu-Medrol today and after that oral prednisone and if needed IVIG depending upon the platelet counts tomorrow  No new petechiae  No active bleeding  Flushed face secondary to Solu-Medrol  Current Facility-Administered Medications:     acetaminophen (TYLENOL) tablet 650 mg, 650 mg, Oral, Q6H PRN, Raghav Gauthier MD    ALPRAZolam Buddie Joey) tablet 0 25 mg, 0 25 mg, Oral, Daily PRN, Lorrie Wolf DO    diphenhydrAMINE (BENADRYL) tablet 25 mg, 25 mg, Oral, Q6H PRN, Crys Moser MD, 25 mg at 09/03/19 2110    Allergies   Allergen Reactions    Pollen Extract         No history exists  ROS:  09/05/19 Reviewed 13 systems:  Presently no neurological, cardiac, pulmonary, GI and  symptoms  No other symptoms like fever, chills, active bleeding, bone pains, skin rash, weight loss, arthritic symptoms, tiredness ,  weakness, numbness,  claudication and gait problem  No frequent infections  Not unusually sensitive to heat or cold  No swelling of the ankles  No swollen glands  Patient is anxious  Other symptoms are in HPI        /90   Pulse (!) 110   Temp 98 4 °F (36 9 °C)   Resp 20   Ht 5' 5" (1 651 m)   Wt 122 kg (270 lb)   SpO2 98%   BMI 44 93 kg/m²     Physical Exam:  Alert, oriented, not in distress, no icterus, flushed face, no oral thrush, no palpable neck mass, clear lung fields, regular heart rate, abdomen  soft and non tender, no palpable abdominal mass, no ascites, no edema of ankles, no calf tenderness, no focal neurological deficit, no skin rash, fading petechiae, no palpable lymphadenopathy in the neck and axillary areas, good arterial pulses, no clubbing  Patient is anxious        IMAGING:      LABS:  Results for orders placed or performed during the hospital encounter of 09/03/19 TSH, 3rd generation with Free T4 reflex   Result Value Ref Range    TSH 3RD GENERATON 1 470 0 358 - 3 740 uIU/mL   CBC and differential   Result Value Ref Range    WBC 5 35 4 31 - 10 16 Thousand/uL    RBC 5 03 3 81 - 5 12 Million/uL    Hemoglobin 14 0 11 5 - 15 4 g/dL    Hematocrit 41 8 34 8 - 46 1 %    MCV 83 82 - 98 fL    MCH 27 8 26 8 - 34 3 pg    MCHC 33 5 31 4 - 37 4 g/dL    RDW 13 2 11 6 - 15 1 %    Platelets 0 (LL) 177 - 390 Thousands/uL    nRBC 0 /100 WBCs    Neutrophils Relative 69 43 - 75 %    Immat GRANS % 0 0 - 2 %    Lymphocytes Relative 22 14 - 44 %    Monocytes Relative 8 4 - 12 %    Eosinophils Relative 1 0 - 6 %    Basophils Relative 0 0 - 1 %    Neutrophils Absolute 3 68 1 85 - 7 62 Thousands/µL    Immature Grans Absolute 0 02 0 00 - 0 20 Thousand/uL    Lymphocytes Absolute 1 19 0 60 - 4 47 Thousands/µL    Monocytes Absolute 0 41 0 17 - 1 22 Thousand/µL    Eosinophils Absolute 0 03 0 00 - 0 61 Thousand/µL    Basophils Absolute 0 02 0 00 - 0 10 Thousands/µL   Hepatitis panel, acute   Result Value Ref Range    Hepatitis B Surface Ag Non-reactive Non-reactive, NonReactive - Confirmed    Hep A IgM Non-reactive Non-reactive, Equivocal-Suggest Recollect    Hepatitis C Ab Non-reactive Non-reactive    Hep B C IgM Non-reactive Non-reactive   Rapid HIV 1/2 AB-AG Combo   Result Value Ref Range    Rapid HIV 1 AND 2 Non-Reactive Non-Reactive    HIV-1 P24 Ag Screen Non-Reactive Non-Reactive   Basic metabolic panel   Result Value Ref Range    Sodium 140 136 - 145 mmol/L    Potassium 3 6 3 5 - 5 3 mmol/L    Chloride 106 100 - 108 mmol/L    CO2 27 21 - 32 mmol/L    ANION GAP 7 4 - 13 mmol/L    BUN 12 5 - 25 mg/dL    Creatinine 0 75 0 60 - 1 30 mg/dL    Glucose 138 65 - 140 mg/dL    Calcium 9 6 8 3 - 10 1 mg/dL    eGFR 110 ml/min/1 73sq m   Protime-INR   Result Value Ref Range    Protime 12 9 11 6 - 14 5 seconds    INR 1 01 0 84 - 1 19   APTT   Result Value Ref Range    PTT 30 23 - 37 seconds   Hemolysis Smear Result Value Ref Range    Hemolysis Smear No Schistocytes or Helmet Cells noted    Sedimentation rate, automated   Result Value Ref Range    Sed Rate 22 (H) 0 - 20 mm/hour   ZEKE Screen w/ Reflex to Titer/Pattern   Result Value Ref Range    ZEKE Positive (A) Negative   CBC and differential   Result Value Ref Range    WBC 6 83 4 31 - 10 16 Thousand/uL    RBC 5 09 3 81 - 5 12 Million/uL    Hemoglobin 13 8 11 5 - 15 4 g/dL    Hematocrit 41 9 34 8 - 46 1 %    MCV 82 82 - 98 fL    MCH 27 1 26 8 - 34 3 pg    MCHC 32 9 31 4 - 37 4 g/dL    RDW 13 1 11 6 - 15 1 %    MPV 11 0 8 9 - 12 7 fL    Platelets 31 (LL) 249 - 390 Thousands/uL    nRBC 0 /100 WBCs    Neutrophils Relative 89 (H) 43 - 75 %    Immat GRANS % 1 0 - 2 %    Lymphocytes Relative 9 (L) 14 - 44 %    Monocytes Relative 1 (L) 4 - 12 %    Eosinophils Relative 0 0 - 6 %    Basophils Relative 0 0 - 1 %    Neutrophils Absolute 6 07 1 85 - 7 62 Thousands/µL    Immature Grans Absolute 0 07 0 00 - 0 20 Thousand/uL    Lymphocytes Absolute 0 61 0 60 - 4 47 Thousands/µL    Monocytes Absolute 0 07 (L) 0 17 - 1 22 Thousand/µL    Eosinophils Absolute 0 00 0 00 - 0 61 Thousand/µL    Basophils Absolute 0 01 0 00 - 0 10 Thousands/µL   Basic metabolic panel   Result Value Ref Range    Sodium 137 136 - 145 mmol/L    Potassium 4 0 3 5 - 5 3 mmol/L    Chloride 106 100 - 108 mmol/L    CO2 24 21 - 32 mmol/L    ANION GAP 7 4 - 13 mmol/L    BUN 12 5 - 25 mg/dL    Creatinine 0 74 0 60 - 1 30 mg/dL    Glucose 144 (H) 65 - 140 mg/dL    Calcium 9 6 8 3 - 10 1 mg/dL    eGFR 112 ml/min/1 73sq m   ZEKE Titer (Reflex test-do not order)   Result Value Ref Range    ZEKE Titer 1 Titer of 40     ZEKE Pattern 1 Speckled pattern    CBC and differential   Result Value Ref Range    WBC 15 72 (H) 4 31 - 10 16 Thousand/uL    RBC 4 91 3 81 - 5 12 Million/uL    Hemoglobin 13 3 11 5 - 15 4 g/dL    Hematocrit 40 6 34 8 - 46 1 %    MCV 83 82 - 98 fL    MCH 27 1 26 8 - 34 3 pg    MCHC 32 8 31 4 - 37 4 g/dL    RDW 13 2 11 6 - 15 1 %    MPV 13 8 (H) 8 9 - 12 7 fL    Platelets 33 (LL) 516 - 390 Thousands/uL    nRBC 0 /100 WBCs    Neutrophils Relative 88 (H) 43 - 75 %    Immat GRANS % 1 0 - 2 %    Lymphocytes Relative 7 (L) 14 - 44 %    Monocytes Relative 4 4 - 12 %    Eosinophils Relative 0 0 - 6 %    Basophils Relative 0 0 - 1 %    Neutrophils Absolute 13 87 (H) 1 85 - 7 62 Thousands/µL    Immature Grans Absolute 0 11 0 00 - 0 20 Thousand/uL    Lymphocytes Absolute 1 09 0 60 - 4 47 Thousands/µL    Monocytes Absolute 0 64 0 17 - 1 22 Thousand/µL    Eosinophils Absolute 0 00 0 00 - 0 61 Thousand/µL    Basophils Absolute 0 01 0 00 - 0 10 Thousands/µL   Comprehensive metabolic panel   Result Value Ref Range    Sodium 140 136 - 145 mmol/L    Potassium 4 7 3 5 - 5 3 mmol/L    Chloride 108 100 - 108 mmol/L    CO2 24 21 - 32 mmol/L    ANION GAP 8 4 - 13 mmol/L    BUN 16 5 - 25 mg/dL    Creatinine 0 80 0 60 - 1 30 mg/dL    Glucose 135 65 - 140 mg/dL    Calcium 9 7 8 3 - 10 1 mg/dL    AST 31 5 - 45 U/L    ALT 32 12 - 78 U/L    Alkaline Phosphatase 85 46 - 116 U/L    Total Protein 7 8 6 4 - 8 2 g/dL    Albumin 3 8 3 5 - 5 0 g/dL    Total Bilirubin 0 60 0 20 - 1 00 mg/dL    eGFR 102 ml/min/1 73sq m   Type and screen   Result Value Ref Range    ABO Grouping AB     Rh Factor Positive     Antibody Screen Negative     Specimen Expiration Date [id number]    Prepare platelet pheresis:Has consent been obtained?  Yes, 2 Units   Result Value Ref Range    Unit Product Code F4029F92     Unit Number [id number]     Unit ABO O     Unit [facility] POS     Unit Dispense Status Presumed Trans     Unit Product Code E1311X93     Unit Number [id number]     Unit ABO O     Unit [facility] POS     Unit Dispense Status Presumed Trans      Labs, Imaging, & Other studies:   All pertinent labs and imaging studies were personally reviewed    Lab Results   Component Value Date    K 4 7 09/05/2019     09/05/2019    CO2 24 09/05/2019    BUN 16 09/05/2019    CREATININE 0 80 09/05/2019    GLUF 111 (H) 09/03/2019    CALCIUM 9 7 09/05/2019    AST 31 09/05/2019    ALT 32 09/05/2019    ALKPHOS 85 09/05/2019    EGFR 102 09/05/2019     Lab Results   Component Value Date    WBC 15 72 (H) 09/05/2019    HGB 13 3 09/05/2019    HCT 40 6 09/05/2019    MCV 83 09/05/2019    PLT 33 (LL) 09/05/2019   No results found for: [address] [place] [address] and discussed with patient  Assessment and plan:  Acute adult ITP  Responding to high dose Solu-Medrol  To be switched to prednisone tomorrow depending upon the platelet count and if not a good response she could have IVIG therapy  Outpatient follow-up in 1 week post discharge with blood counts and chemistry  Discussed with patient and her family  Questions answered  Addendum:  Encouraged patient to move around especially when her family is here and move her legs to prevent DVT      Patient voiced understanding and agreement in the discussion  Counseling / Coordination of Care   Greater than 50% of total time was spent with the patient and / or family counseling and / or coordination of care

## 2019-09-06 VITALS
DIASTOLIC BLOOD PRESSURE: 80 MMHG | HEIGHT: 65 IN | TEMPERATURE: 98.4 F | RESPIRATION RATE: 20 BRPM | BODY MASS INDEX: 44.98 KG/M2 | OXYGEN SATURATION: 97 % | HEART RATE: 102 BPM | SYSTOLIC BLOOD PRESSURE: 128 MMHG | WEIGHT: 270 LBS

## 2019-09-06 LAB
ANION GAP SERPL CALCULATED.3IONS-SCNC: 7 MMOL/L (ref 4–13)
BASOPHILS # BLD AUTO: 0.02 THOUSANDS/ΜL (ref 0–0.1)
BASOPHILS NFR BLD AUTO: 0 % (ref 0–1)
BUN SERPL-MCNC: 20 MG/DL (ref 5–25)
CALCIUM SERPL-MCNC: 9.2 MG/DL (ref 8.3–10.1)
CHLORIDE SERPL-SCNC: 107 MMOL/L (ref 100–108)
CO2 SERPL-SCNC: 25 MMOL/L (ref 21–32)
CREAT SERPL-MCNC: 0.78 MG/DL (ref 0.6–1.3)
EOSINOPHIL # BLD AUTO: 0 THOUSAND/ΜL (ref 0–0.61)
EOSINOPHIL NFR BLD AUTO: 0 % (ref 0–6)
ERYTHROCYTE [DISTWIDTH] IN BLOOD BY AUTOMATED COUNT: 13.5 % (ref 11.6–15.1)
EST. AVERAGE GLUCOSE BLD GHB EST-MCNC: 103 MG/DL
GFR SERPL CREATININE-BSD FRML MDRD: 105 ML/MIN/1.73SQ M
GLUCOSE SERPL-MCNC: 107 MG/DL (ref 65–140)
HBA1C MFR BLD: 5.2 % (ref 4.2–6.3)
HCT VFR BLD AUTO: 41.2 % (ref 34.8–46.1)
HGB BLD-MCNC: 13.4 G/DL (ref 11.5–15.4)
IMM GRANULOCYTES # BLD AUTO: 0.17 THOUSAND/UL (ref 0–0.2)
IMM GRANULOCYTES NFR BLD AUTO: 1 % (ref 0–2)
LYMPHOCYTES # BLD AUTO: 1.06 THOUSANDS/ΜL (ref 0.6–4.47)
LYMPHOCYTES NFR BLD AUTO: 8 % (ref 14–44)
MCH RBC QN AUTO: 27.1 PG (ref 26.8–34.3)
MCHC RBC AUTO-ENTMCNC: 32.5 G/DL (ref 31.4–37.4)
MCV RBC AUTO: 83 FL (ref 82–98)
MONOCYTES # BLD AUTO: 0.68 THOUSAND/ΜL (ref 0.17–1.22)
MONOCYTES NFR BLD AUTO: 5 % (ref 4–12)
NEUTROPHILS # BLD AUTO: 11.26 THOUSANDS/ΜL (ref 1.85–7.62)
NEUTS SEG NFR BLD AUTO: 86 % (ref 43–75)
NRBC BLD AUTO-RTO: 0 /100 WBCS
PLATELET # BLD AUTO: 59 THOUSANDS/UL (ref 149–390)
PMV BLD AUTO: 14.5 FL (ref 8.9–12.7)
POTASSIUM SERPL-SCNC: 3.6 MMOL/L (ref 3.5–5.3)
RBC # BLD AUTO: 4.94 MILLION/UL (ref 3.81–5.12)
SODIUM SERPL-SCNC: 139 MMOL/L (ref 136–145)
WBC # BLD AUTO: 13.19 THOUSAND/UL (ref 4.31–10.16)

## 2019-09-06 PROCEDURE — 80048 BASIC METABOLIC PNL TOTAL CA: CPT | Performed by: INTERNAL MEDICINE

## 2019-09-06 PROCEDURE — NC001 PR NO CHARGE: Performed by: INTERNAL MEDICINE

## 2019-09-06 PROCEDURE — 85025 COMPLETE CBC W/AUTO DIFF WBC: CPT | Performed by: INTERNAL MEDICINE

## 2019-09-06 PROCEDURE — G8979 MOBILITY GOAL STATUS: HCPCS

## 2019-09-06 PROCEDURE — G8978 MOBILITY CURRENT STATUS: HCPCS

## 2019-09-06 PROCEDURE — 97163 PT EVAL HIGH COMPLEX 45 MIN: CPT

## 2019-09-06 PROCEDURE — 83036 HEMOGLOBIN GLYCOSYLATED A1C: CPT | Performed by: INTERNAL MEDICINE

## 2019-09-06 PROCEDURE — 99232 SBSQ HOSP IP/OBS MODERATE 35: CPT | Performed by: INTERNAL MEDICINE

## 2019-09-06 RX ORDER — SODIUM CHLORIDE 9 MG/ML
100 INJECTION, SOLUTION INTRAVENOUS CONTINUOUS
Status: DISCONTINUED | OUTPATIENT
Start: 2019-09-06 | End: 2019-09-06 | Stop reason: HOSPADM

## 2019-09-06 RX ORDER — PREDNISONE 20 MG/1
100 TABLET ORAL DAILY
Status: DISCONTINUED | OUTPATIENT
Start: 2019-09-06 | End: 2019-09-06 | Stop reason: HOSPADM

## 2019-09-06 RX ORDER — PREDNISONE 20 MG/1
100 TABLET ORAL DAILY
Qty: 70 TABLET | Refills: 0 | Status: SHIPPED | OUTPATIENT
Start: 2019-09-07 | End: 2019-09-21 | Stop reason: HOSPADM

## 2019-09-06 RX ADMIN — PREDNISONE 100 MG: 20 TABLET ORAL at 15:15

## 2019-09-06 RX ADMIN — SODIUM CHLORIDE 100 ML/HR: 0.9 INJECTION, SOLUTION INTRAVENOUS at 14:45

## 2019-09-06 NOTE — PLAN OF CARE
Problem: Potential for Falls  Goal: Patient will remain free of falls  Description  INTERVENTIONS:  - Assess patient frequently for physical needs  -  Identify cognitive and physical deficits and behaviors that affect risk of falls    -  [place] fall precautions as indicated by assessment   - Educate patient/family on patient safety including physical limitations  - Instruct patient to call for assistance with activity based on assessment  - Modify environment to reduce risk of injury  - Consider OT/PT consult to assist with strengthening/mobility  Outcome: Progressing     Problem: INFECTION - ADULT  Goal: Absence or prevention of progression during hospitalization  Description  INTERVENTIONS:  - Assess and monitor for signs and symptoms of infection  - Monitor lab/diagnostic results  - Monitor all insertion sites, i e  indwelling lines, tubes, and drains  - Monitor endotracheal if appropriate and nasal secretions for changes in amount and color  - [place] appropriate cooling/warming therapies per order  - Administer medications as ordered  - Instruct and encourage patient and family to use good hand hygiene technique  - Identify and instruct in appropriate isolation precautions for identified infection/condition  Outcome: Progressing  Goal: Absence of fever/infection during neutropenic period  Description  INTERVENTIONS:  - Monitor WBC    Outcome: Progressing     Problem: SAFETY ADULT  Goal: Maintain or return to baseline ADL function  Description  INTERVENTIONS:  -  Assess patient's ability to carry out ADLs; assess patient's baseline for ADL function and identify physical deficits which impact ability to perform ADLs (bathing, care of mouth/teeth, toileting, grooming, dressing, etc )  - Assess/evaluate cause of self-care deficits   - Assess range of motion  - Assess patient's mobility; develop plan if impaired  - Assess patient's need for assistive devices and provide as appropriate  - Encourage maximum independence but intervene and supervise when necessary  - Involve family in performance of ADLs  - Assess for home care needs following discharge   - Consider OT consult to assist with ADL evaluation and planning for discharge  - Provide patient education as appropriate  Outcome: Progressing  Goal: Maintain or return mobility status to optimal level  Description  INTERVENTIONS:  - Assess patient's baseline mobility status (ambulation, transfers, stairs, etc )    - Identify cognitive and physical deficits and behaviors that affect mobility  - Identify mobility aids required to assist with transfers and/or ambulation (gait belt, sit-to-stand, lift, walker, cane, etc )  - [place] fall precautions as indicated by assessment  - Record patient progress and toleration of activity level on Mobility SBAR; progress patient to next Phase/Stage  - Instruct patient to call for assistance with activity based on assessment  - Consider rehabilitation consult to assist with strengthening/weightbearing, etc   Outcome: Progressing     Problem: Knowledge Deficit  Goal: Patient/family/caregiver demonstrates understanding of disease process, treatment plan, medications, and discharge instructions  Description  Complete learning assessment and assess knowledge base  Interventions:  - Provide teaching at level of understanding  - Provide teaching via preferred learning methods  Outcome: Progressing     Problem: Nutrition/Hydration-ADULT  Goal: Nutrient/Hydration intake appropriate for improving, restoring or maintaining nutritional needs  Description  Monitor and assess patient's nutrition/hydration status for malnutrition  Collaborate with interdisciplinary team and initiate plan and interventions as ordered  Monitor patient's weight and dietary intake as ordered or per policy  Utilize nutrition screening tool and intervene as necessary  Determine patient's food preferences and provide high-protein, high-caloric foods as appropriate  INTERVENTIONS:  - Monitor oral intake, urinary output, labs, and treatment plans  - Assess nutrition and hydration status and recommend course of action  - Evaluate amount of meals eaten  - Assist patient with eating if necessary   - Allow adequate time for meals  - Recommend/ encourage appropriate diets, oral nutritional supplements, and vitamin/mineral supplements  - Order, calculate, and assess calorie counts as needed  - Recommend, monitor, and adjust tube feedings and TPN/PPN based on assessed needs  - Assess need for intravenous fluids  - Provide specific nutrition/hydration education as appropriate  - Include patient/family/caregiver in decisions related to nutrition  Outcome: Progressing

## 2019-09-06 NOTE — PLAN OF CARE
Problem: PHYSICAL THERAPY ADULT  Goal: Performs mobility at highest level of function for planned discharge setting  See evaluation for individualized goals  Description    Outcome: Progressing  Note:   Prognosis: Good  Problem List: Decreased strength, Decreased endurance, Impaired balance, Decreased mobility(feeling light headed)  Assessment: Pt is a 31 yo female admitted to [place] [address] on 9/3/2019 s/p visiting PCP who found her to have critically low platelets  Two patient identifiers were used to confirm  Pt lives at home in an apartment alone but will be living with her parents after d/c  Her parents have 2 KEIRA, 1 step has HR  Pt has the option of a FFSU if necessary  Pt will have 24/7 support  Pt works full time and drives  Pt was I for ADL's and mobility without an AD prior to admit  DX: thrombocytopenia, petechiae, anxiety disorder  Pt's impairments include decreased mobility, limited endurance, feeling faint post activity, anxious around mobility and fall risk  These impairments limit the ability of the patient to perform mobility without increased assistance, return to PLOF and participate in everyday life activities  Pt would benefit from continued skilled therapy while in the hospital to improve functional mobility with less assistance and facilitate a safe d/c  Recommend discharge to home with home PT  At the end of the session the patient was left in supine position with call bell and phone within reach  Nurse present at the end of the session  Barriers to Discharge: Inaccessible home environment     Recommendation: Home with family support, Home PT     PT - OK to Discharge: No    See flowsheet documentation for full assessment

## 2019-09-06 NOTE — PROGRESS NOTES
INTERNAL MEDICINE RESIDENCY PROGRESS NOTE     Name: Alejandra Kaminski   Age & Sex: 30 y o  female   MRN: [id number]  [facility]   Encounter: [id number]  Team: SOD Team C     PATIENT INFORMATION     Name: Alejandra Kaminski   Age & Sex: 30 y o  female   MRN: [id number]  Hospital Stay Days: 3    ASSESSMENT/PLAN     Principal Problem: Thrombocytopenia ([facility] [address] )  Active Problems:    Seasonal allergies    Other specified anxiety disorders    Morbid obesity (HCC)    Petechiae      * Thrombocytopenia ([facility] [address] )  Assessment & Plan  Patient presented to [facility] with 2 day history of petechiae  Platelets drawn on outpatient labs were 1 and subsequently found on admission to the ED of 0  Unknown etiology currently, likely ITP  CBC shows isolated thrombocytopenia making decreased production less likely  No risk factors for non immune mediated MAHA  Likely immune mediated destruction  Secondary causes such as infection have been ruled out (HIV, HCV neg), TSH normal   However she does admit to frequent NSAID use  ZEKE elevated with speckled pattern and titer 40  No Schistocytes on peripheral smear  No signs of active bleeding at this time  Platelets 59 today from 33 yesterday  · 2 units of irradiated platelets transfused on day of admission  · Completed Solu-Medrol 1 g IV for 3 days  · Will start on prednisone 100mg daily  · Daily CBC and BMP  · Hematology consulted: will start on prednisone and follow up as an outpatient      [address]  Patient has diffuse petechiae secondary to above throughout her body and in her oral mucosa  Unchanged from admission at this time  Other specified anxiety disorders  Assessment & Plan  Patient takes Xanax p r n  When she is in large crowds  Last needed when she traveled on August 18th  Less anxious today on exam  - Xanax 0 25mg prn daily  - will continue to monitor      Disposition: continue inpatient    SUBJECTIVE     Patient seen and examined  No acute events overnight  She is very fatigued and feels weak  She has been ambulating using a walker  She continues to deny any bleeding at this time, nausea, sob of breath  OBJECTIVE     Vitals:    19 2116 19 2220 19 0741 19 1344   BP:  158/84 124/74 102/69   Pulse: 89 63 76 70   Resp:  20 20    Temp: 99 1 °F (37 3 °C) 99 6 °F (37 6 °C) 98 7 °F (37 1 °C)    TempSrc:       SpO2: 97% 97% 97% 97%   Weight:       Height:          Temperature:   Temp (24hrs), Av 1 °F (37 3 °C), Min:98 7 °F (37 1 °C), Max:99 6 °F (37 6 °C)    Temperature: 98 7 °F (37 1 °C)  Intake & Output:  I/O        07 -  0700  07 -  0700  07 -  0700    P  O   0     Blood  1000     IV Piggyback  250     Total Intake(mL/kg)  1250 (10 2)     Urine (mL/kg/hr)  1825     Total Output  1825     Net  -575                Weights:   IBW: 57 kg    Body mass index is 44 93 kg/m²  Weight (last 2 days)     None        Physical Exam   Constitutional: She is oriented to person, place, and time  She appears well-developed and well-nourished  No distress  HENT:   Head: Normocephalic and atraumatic  Mouth/Throat: Oropharynx is clear and moist    No petechial rash appreciated, flushed appearing   Cardiovascular: Normal rate, regular rhythm and normal heart sounds  Exam reveals no friction rub  No murmur heard  Pulmonary/Chest: Effort normal and breath sounds normal  No respiratory distress  She has no rales  Abdominal: Soft  Bowel sounds are normal  She exhibits no distension and no mass  There is no tenderness  There is no guarding  Musculoskeletal: Normal range of motion  She exhibits no edema or deformity  Neurological: She is alert and oriented to person, place, and time  Skin: Skin is warm and dry  Rash noted  She is not diaphoretic  No pallor  Petechial rash on bilateral arms and legs    Psychiatric: She has a normal mood and affect   Her behavior is normal  Thought content normal    Vitals reviewed  LABORATORY DATA     Labs: I have personally reviewed pertinent reports  Results from last 7 days   Lab Units 09/06/19  0437 09/05/19  0535 09/04/19  0546   WBC Thousand/uL 13 19* 15 72* 6 83   HEMOGLOBIN g/dL 13 4 13 3 13 8   HEMATOCRIT % 41 2 40 6 41 9   PLATELETS Thousands/uL 59* 33* 31*   NEUTROS PCT % 86* 88* 89*   MONOS PCT % 5 4 1*      Results from last 7 days   Lab Units 09/06/19  0437 09/05/19  0535 09/04/19  0546  09/03/19  0942   POTASSIUM mmol/L 3 6 4 7 4 0   < > 4 5   CHLORIDE mmol/L 107 108 106   < > 108   CO2 mmol/L 25 24 24   < > 27   BUN mg/dL 20 16 12   < > 12   CREATININE mg/dL 0 78 0 80 0 74   < > 0 78   CALCIUM mg/dL 9 2 9 7 9 6   < > 9 9   ALK PHOS U/L  --  85  --   --  87   ALT U/L  --  32  --   --  30   AST U/L  --  31  --   --  15    < > = values in this interval not displayed  Results from last 7 days   Lab Units 09/03/19  1646 09/03/19  0942   INR  1 01 1 01   PTT seconds 30  --                IMAGING & DIAGNOSTIC TESTING     Radiology Results: I have personally reviewed pertinent reports  No results found  Other Diagnostic Testing: I have personally reviewed pertinent reports  ACTIVE MEDICATIONS     Current Facility-Administered Medications   Medication Dose Route Frequency    acetaminophen (TYLENOL) tablet 650 mg  650 mg Oral Q6H PRN    ALPRAZolam (XANAX) tablet 0 25 mg  0 25 mg Oral Daily PRN    diphenhydrAMINE (BENADRYL) tablet 25 mg  25 mg Oral Q6H PRN    sodium chloride 0 9 % infusion  100 mL/hr Intravenous Continuous       VTE Pharmacologic Prophylaxis: Reason for no pharmacologic prophylaxis thrombocytopenia  VTE Mechanical Prophylaxis: sequential compression device    Portions of the record may have been created with voice recognition software  Occasional wrong word or "sound a like" substitutions may have occurred due to the inherent limitations of voice recognition software    Read the chart carefully and recognize, using context, where substitutions have occurred   ==  Margot Romero MD  [address]  Internal Medicine Residency PGY-1

## 2019-09-06 NOTE — PHYSICAL THERAPY NOTE
Physical Therapy Evaluation Note     Patient Name: Tammie Green    RSEKQ'K Date: 9/6/2019     Problem List  Patient Active Problem List   Diagnosis    Seasonal allergies    Other specified anxiety disorders    Morbid obesity ([facility] [address] )    Petechiae    Thrombocytopenia ([facility] [address] )        Past Medical History  Past Medical History:   Diagnosis Date    Anxiety         Past Surgical History  Past Surgical History:   Procedure Laterality Date    WISDOM TOOTH EXTRACTION             09/06/19 1451   Note Type   Note type Eval only   Pain Assessment   Pain Assessment Ryan-Baker FACES   Ryan-Baker FACES Pain Rating 0   Home Living   Type of Home Apartment   Additional Comments Pt plans on living at her parents house temp  after d/c  Pt's parents have 1+1 KEIRA with HR on one step  Pt will have 24/7 support when home and has the option of FFSU  Pt lives in an apartment alone with 3 flights of steps to enter  Pt works full time  Pt drives  Pt was I for ADL's and mobility prior to admission  Pt does not own an AD's      Prior Function   Level of [place] Independent with ADLs and functional mobility   Lives With Alone   Receives Help From Family   ADL Assistance Independent   IADLs Independent   Falls in the last 6 months 0   Vocational Full time employment   Comments (+) drives    Restrictions/Precautions   Weight Bearing Precautions Per Order No   Other Precautions Fall Risk   General   Family/Caregiver Present No   Cognition   Overall Cognitive Status WFL   Arousal/Participation Alert   Orientation Level Oriented X4   Memory Within functional limits   Following Commands Follows all commands and directions without difficulty   RLE Assessment   RLE Assessment WFL   LLE Assessment   LLE Assessment WFL   Coordination   Movements are Fluid and Coordinated 1   Sensation WFL   Light Touch   RLE Light Touch Grossly intact   LLE Light Touch Grossly intact   Bed Mobility   Rolling L 7 Independent   Supine to Sit 7  Independent   Sit to Supine 7  Independent   Transfers   Sit to Stand 5  Supervision   Stand to Sit 5  Supervision   Ambulation/Elevation   Gait pattern Excessively slow; Foward flexed  (unsteady)   Gait Assistance 5  Supervision   Additional items Assist x 1   Assistive Device Rolling walker   Distance 30ftx1   Stair Management Assistance 4  Minimal assist   Additional items Assist x 1   Stair Management Technique One rail L   Number of Stairs 2   Balance   Static Sitting Fair +   Dynamic Sitting Fair   Static Standing Fair -   Dynamic Standing Fair -   Ambulatory Fair -   Endurance Deficit   Endurance Deficit Yes   Activity Tolerance   Activity Tolerance Patient limited by fatigue  (some anxiety around ambualting seemed to be limiting )   Medical Staff Made Aware nurse present at the endo of    Nurse Made Aware nurse approved therapy session   Assessment   Prognosis Good   Problem List Decreased strength;Decreased endurance; Impaired balance;Decreased mobility  (feeling light headed)   Assessment Pt is a 31 yo female admitted to [place] [address] on 9/3/2019 s/p visiting PCP who found her to have critically low platelets  Two patient identifiers were used to confirm  Pt lives at home in an apartment alone but will be living with her parents after d/c  Her parents have 2 KEIRA, 1 step has HR  Pt has the option of a FFSU if necessary  Pt will have 24/7 support  Pt works full time and drives  Pt was I for ADL's and mobility without an AD prior to admit  DX: thrombocytopenia, petechiae, anxiety disorder  Pt's impairments include decreased mobility, limited endurance, feeling faint post activity, anxious around mobility and fall risk  These impairments limit the ability of the patient to perform mobility without increased assistance, return to PLOF and participate in everyday life activities   Pt would benefit from continued skilled therapy while in the hospital to improve functional mobility with less assistance and facilitate a safe d/c  Recommend discharge to home with home PT  At the end of the session the patient was left in supine position with call bell and phone within reach  Nurse present at the end of the session  Barriers to Discharge Inaccessible home environment   Goals   STG Expiration Date 09/16/19   Short Term Goal #1 STG 1: Pt will be MI/I for transfers to reduce level of assistance needed upon d/c  STG 2: Pt will ambulate 150ft with least restrictive device at a I/MI level to return to PLOF, without LOB  STG 3: Pt will negotiate 2 steps with/without HR at a I level  Treatment Day 0   Plan   Treatment/Interventions Functional transfer training;Elevations; Bed mobility;Gait training;Equipment eval/education   PT Frequency   (3-6x wk)   Recommendation   Recommendation Home with family support;Home PT   Equipment Recommended Walker   PT - OK to Discharge No   Additional Comments need to increase amb distance and stair negotiation to ensure safety with activities        Modified Allen Scale   Modified Allen Scale 2   Barthel Index   Feeding 10   Bathing 0   Grooming Score 0   Dressing Score 5   Bladder Score 10   Bowels Score 10   Toilet Use Score 10   Transfers (Bed/Chair) Score 10   Mobility (Level Surface) Score 0   Stairs Score 5   Barthel Index Score 60     Kelly Long, Pt, DPT

## 2019-09-08 DIAGNOSIS — D69.3 ACUTE ITP (HCC): Primary | ICD-10-CM

## 2019-09-09 ENCOUNTER — TELEPHONE (OUTPATIENT)
Dept: HEMATOLOGY ONCOLOGY | Facility: CLINIC | Age: 24
End: 2019-09-09

## 2019-09-09 ENCOUNTER — TRANSITIONAL CARE MANAGEMENT (OUTPATIENT)
Dept: INTERNAL MEDICINE CLINIC | Facility: CLINIC | Age: 24
End: 2019-09-09

## 2019-09-09 ENCOUNTER — TELEPHONE (OUTPATIENT)
Dept: SURGICAL ONCOLOGY | Facility: CLINIC | Age: 24
End: 2019-09-09

## 2019-09-09 NOTE — TELEPHONE ENCOUNTER
New Patient Encounter    New Patient Intake Form   Patient Details:  Libby Vásquez  1995  [id number]    Background Information:  [address] starts by opening a telephone encounter and gathering the following information   Who is calling to schedule? If not self, relationship to patient? self   Referring Provider Hospital discharge   What is the diagnosis? Low platelets   When was the diagnosis? 8/2019   Is patient aware of diagnosis? Yes   Reason for visit? NP DX   Have you had any testing done? If so: when, where? Yes   Are records in [employer]? yes   Was the patient told to bring a disk? no   Scheduling Information:   Preferred [place]:  Any     Requesting Specific Provider? no   Are there any dates/time the patient cannot be seen? no   Counseling Pre-Screen:  If the patient answers YES to any of the below questions, please route to the appropriate location specific counselor    Have you felt anxious or worried about cancer and the treatment you are receiving? No   Has your diagnosis caused physical, emotional, or financial hardship for you? No   Note: Do not ask the patient about transportation issues/needs  Please notate if the patient brings it up and the counselor will schedule accordingly  Miscellaneous: n/a   After completing the above information, please route to Financial Counselor and the appropriate Nurse Navigator for review

## 2019-09-09 NOTE — TELEPHONE ENCOUNTER
Spoke to patient and informed that she should have weekly lab draws  I informed her that orders are in the system and we will go over them with her at her next scheduled F/U appt  Patient voiced understanding

## 2019-09-10 ENCOUNTER — APPOINTMENT (OUTPATIENT)
Dept: LAB | Age: 24
End: 2019-09-10
Payer: COMMERCIAL

## 2019-09-10 ENCOUNTER — TELEPHONE (OUTPATIENT)
Dept: HEMATOLOGY ONCOLOGY | Facility: CLINIC | Age: 24
End: 2019-09-10

## 2019-09-10 LAB
ALBUMIN SERPL BCP-MCNC: 3.5 G/DL (ref 3.5–5)
ALP SERPL-CCNC: 71 U/L (ref 46–116)
ALT SERPL W P-5'-P-CCNC: 54 U/L (ref 12–78)
ANION GAP SERPL CALCULATED.3IONS-SCNC: 4 MMOL/L (ref 4–13)
AST SERPL W P-5'-P-CCNC: 12 U/L (ref 5–45)
BASOPHILS # BLD MANUAL: 0 THOUSAND/UL (ref 0–0.1)
BASOPHILS NFR MAR MANUAL: 0 % (ref 0–1)
BILIRUB SERPL-MCNC: 0.56 MG/DL (ref 0.2–1)
BUN SERPL-MCNC: 15 MG/DL (ref 5–25)
CALCIUM SERPL-MCNC: 8.9 MG/DL (ref 8.3–10.1)
CHLORIDE SERPL-SCNC: 104 MMOL/L (ref 100–108)
CO2 SERPL-SCNC: 28 MMOL/L (ref 21–32)
CREAT SERPL-MCNC: 0.8 MG/DL (ref 0.6–1.3)
EOSINOPHIL # BLD MANUAL: 0 THOUSAND/UL (ref 0–0.4)
EOSINOPHIL NFR BLD MANUAL: 0 % (ref 0–6)
ERYTHROCYTE [DISTWIDTH] IN BLOOD BY AUTOMATED COUNT: 13.5 % (ref 11.6–15.1)
GFR SERPL CREATININE-BSD FRML MDRD: 102 ML/MIN/1.73SQ M
GLUCOSE P FAST SERPL-MCNC: 102 MG/DL (ref 65–99)
HCT VFR BLD AUTO: 44.1 % (ref 34.8–46.1)
HGB BLD-MCNC: 14.3 G/DL (ref 11.5–15.4)
LYMPHOCYTES # BLD AUTO: 1.52 THOUSAND/UL (ref 0.6–4.47)
LYMPHOCYTES # BLD AUTO: 11 % (ref 14–44)
MCH RBC QN AUTO: 26.9 PG (ref 26.8–34.3)
MCHC RBC AUTO-ENTMCNC: 32.4 G/DL (ref 31.4–37.4)
MCV RBC AUTO: 83 FL (ref 82–98)
METAMYELOCYTES NFR BLD MANUAL: 2 % (ref 0–1)
MONOCYTES # BLD AUTO: 1.52 THOUSAND/UL (ref 0–1.22)
MONOCYTES NFR BLD: 11 % (ref 4–12)
NEUTROPHILS # BLD MANUAL: 8.85 THOUSAND/UL (ref 1.85–7.62)
NEUTS SEG NFR BLD AUTO: 64 % (ref 43–75)
NRBC BLD AUTO-RTO: 0 /100 WBCS
PLATELET # BLD AUTO: 34 THOUSANDS/UL (ref 149–390)
PLATELET BLD QL SMEAR: ABNORMAL
POTASSIUM SERPL-SCNC: 3.3 MMOL/L (ref 3.5–5.3)
PROT SERPL-MCNC: 7.1 G/DL (ref 6.4–8.2)
RBC # BLD AUTO: 5.32 MILLION/UL (ref 3.81–5.12)
SODIUM SERPL-SCNC: 136 MMOL/L (ref 136–145)
VARIANT LYMPHS # BLD AUTO: 12 %
WBC # BLD AUTO: 13.83 THOUSAND/UL (ref 4.31–10.16)

## 2019-09-10 PROCEDURE — 85027 COMPLETE CBC AUTOMATED: CPT | Performed by: INTERNAL MEDICINE

## 2019-09-10 PROCEDURE — 36415 COLL VENOUS BLD VENIPUNCTURE: CPT | Performed by: INTERNAL MEDICINE

## 2019-09-10 PROCEDURE — 82784 ASSAY IGA/IGD/IGG/IGM EACH: CPT | Performed by: INTERNAL MEDICINE

## 2019-09-10 PROCEDURE — 80053 COMPREHEN METABOLIC PANEL: CPT | Performed by: INTERNAL MEDICINE

## 2019-09-10 PROCEDURE — 85007 BL SMEAR W/DIFF WBC COUNT: CPT | Performed by: INTERNAL MEDICINE

## 2019-09-11 ENCOUNTER — TELEPHONE (OUTPATIENT)
Dept: HEMATOLOGY ONCOLOGY | Facility: CLINIC | Age: 24
End: 2019-09-11

## 2019-09-11 ENCOUNTER — APPOINTMENT (OUTPATIENT)
Dept: LAB | Facility: CLINIC | Age: 24
End: 2019-09-11
Payer: COMMERCIAL

## 2019-09-11 ENCOUNTER — OFFICE VISIT (OUTPATIENT)
Dept: HEMATOLOGY ONCOLOGY | Facility: CLINIC | Age: 24
End: 2019-09-11
Payer: COMMERCIAL

## 2019-09-11 VITALS
HEIGHT: 65 IN | BODY MASS INDEX: 44.65 KG/M2 | RESPIRATION RATE: 18 BRPM | WEIGHT: 268 LBS | OXYGEN SATURATION: 98 % | TEMPERATURE: 98.7 F | SYSTOLIC BLOOD PRESSURE: 146 MMHG | HEART RATE: 89 BPM | DIASTOLIC BLOOD PRESSURE: 82 MMHG

## 2019-09-11 DIAGNOSIS — R23.3 PETECHIAE: ICD-10-CM

## 2019-09-11 DIAGNOSIS — D69.3 ACUTE ITP (HCC): ICD-10-CM

## 2019-09-11 DIAGNOSIS — E87.6 HYPOKALEMIA: ICD-10-CM

## 2019-09-11 DIAGNOSIS — D69.3 ACUTE ITP (HCC): Primary | ICD-10-CM

## 2019-09-11 LAB
BASOPHILS # BLD MANUAL: 0 THOUSAND/UL (ref 0–0.1)
BASOPHILS NFR MAR MANUAL: 0 % (ref 0–1)
EOSINOPHIL # BLD MANUAL: 0 THOUSAND/UL (ref 0–0.4)
EOSINOPHIL NFR BLD MANUAL: 0 % (ref 0–6)
ERYTHROCYTE [DISTWIDTH] IN BLOOD BY AUTOMATED COUNT: 14.2 % (ref 11.6–15.1)
HCT VFR BLD AUTO: 41.5 % (ref 34.8–46.1)
HGB BLD-MCNC: 14.7 G/DL (ref 11.5–15.4)
IGA SERPL-MCNC: 55 MG/DL (ref 70–400)
LYMPHOCYTES # BLD AUTO: 23 % (ref 14–44)
LYMPHOCYTES # BLD AUTO: 3.98 THOUSAND/UL (ref 0.6–4.47)
MCH RBC QN AUTO: 28.4 PG (ref 26.8–34.3)
MCHC RBC AUTO-ENTMCNC: 35.4 G/DL (ref 31.4–37.4)
MCV RBC AUTO: 80 FL (ref 82–98)
METAMYELOCYTES NFR BLD MANUAL: 1 % (ref 0–1)
MONOCYTES # BLD AUTO: 1.21 THOUSAND/UL (ref 0–1.22)
MONOCYTES NFR BLD: 7 % (ref 4–12)
MYELOCYTES NFR BLD MANUAL: 1 % (ref 0–1)
NEUTROPHILS # BLD MANUAL: 11.77 THOUSAND/UL (ref 1.85–7.62)
NEUTS SEG NFR BLD AUTO: 68 % (ref 43–75)
OVALOCYTES BLD QL SMEAR: PRESENT
PLATELET # BLD AUTO: 31 THOUSANDS/UL (ref 149–390)
PLATELET BLD QL SMEAR: ABNORMAL
RBC # BLD AUTO: 5.17 MILLION/UL (ref 3.81–5.12)
TOTAL CELLS COUNTED SPEC: 100
WBC # BLD AUTO: 17.31 THOUSAND/UL (ref 4.31–10.16)

## 2019-09-11 PROCEDURE — 36415 COLL VENOUS BLD VENIPUNCTURE: CPT

## 2019-09-11 PROCEDURE — 85027 COMPLETE CBC AUTOMATED: CPT

## 2019-09-11 PROCEDURE — 85007 BL SMEAR W/DIFF WBC COUNT: CPT

## 2019-09-11 PROCEDURE — 99215 OFFICE O/P EST HI 40 MIN: CPT | Performed by: INTERNAL MEDICINE

## 2019-09-11 RX ORDER — POTASSIUM CHLORIDE 750 MG/1
10 CAPSULE, EXTENDED RELEASE ORAL DAILY
Qty: 15 CAPSULE | Refills: 0 | Status: SHIPPED | OUTPATIENT
Start: 2019-09-11 | End: 2019-09-21 | Stop reason: HOSPADM

## 2019-09-11 RX ORDER — DIPHENHYDRAMINE HCL 25 MG
50 TABLET ORAL ONCE
Status: CANCELLED | OUTPATIENT
Start: 2019-09-12

## 2019-09-11 RX ORDER — SODIUM CHLORIDE 9 MG/ML
20 INJECTION, SOLUTION INTRAVENOUS ONCE
Status: CANCELLED | OUTPATIENT
Start: 2019-09-12

## 2019-09-11 RX ORDER — ACETAMINOPHEN 325 MG/1
650 TABLET ORAL ONCE
Status: CANCELLED | OUTPATIENT
Start: 2019-09-12

## 2019-09-11 NOTE — TELEPHONE ENCOUNTER
Call transferred from [address] [facility]  Pepper from [address]  Simon Porter called with a critical platelet level of 31 thousand on today's labs  Read back occurred  Jesenia Smith RN tasked and IM  Pepper's call back # [phone]

## 2019-09-12 ENCOUNTER — HOSPITAL ENCOUNTER (OUTPATIENT)
Dept: INFUSION CENTER | Facility: CLINIC | Age: 24
Discharge: HOME | End: 2019-09-12
Payer: COMMERCIAL

## 2019-09-12 VITALS
DIASTOLIC BLOOD PRESSURE: 65 MMHG | WEIGHT: 266.1 LBS | SYSTOLIC BLOOD PRESSURE: 99 MMHG | RESPIRATION RATE: 18 BRPM | BODY MASS INDEX: 43.94 KG/M2 | TEMPERATURE: 98.7 F | HEART RATE: 86 BPM

## 2019-09-12 DIAGNOSIS — D69.3 ACUTE ITP (HCC): Primary | ICD-10-CM

## 2019-09-12 PROCEDURE — 96365 THER/PROPH/DIAG IV INF INIT: CPT

## 2019-09-12 PROCEDURE — 96366 THER/PROPH/DIAG IV INF ADDON: CPT

## 2019-09-12 RX ORDER — SODIUM CHLORIDE 9 MG/ML
20 INJECTION, SOLUTION INTRAVENOUS ONCE
Status: CANCELLED | OUTPATIENT
Start: 2019-09-12

## 2019-09-12 RX ORDER — DIPHENHYDRAMINE HCL 25 MG
50 TABLET ORAL ONCE
Status: CANCELLED | OUTPATIENT
Start: 2019-09-12

## 2019-09-12 RX ORDER — DIPHENHYDRAMINE HCL 25 MG
50 TABLET ORAL ONCE
Status: COMPLETED | OUTPATIENT
Start: 2019-09-12 | End: 2019-09-12

## 2019-09-12 RX ORDER — ACETAMINOPHEN 325 MG/1
650 TABLET ORAL ONCE
Status: COMPLETED | OUTPATIENT
Start: 2019-09-12 | End: 2019-09-12

## 2019-09-12 RX ORDER — ACETAMINOPHEN 325 MG/1
650 TABLET ORAL ONCE
Status: CANCELLED | OUTPATIENT
Start: 2019-09-12

## 2019-09-12 RX ORDER — SODIUM CHLORIDE 9 MG/ML
20 INJECTION, SOLUTION INTRAVENOUS ONCE
Status: COMPLETED | OUTPATIENT
Start: 2019-09-12 | End: 2019-09-12

## 2019-09-12 RX ADMIN — SODIUM CHLORIDE 20 ML/HR: 0.9 INJECTION, SOLUTION INTRAVENOUS at 08:41

## 2019-09-12 RX ADMIN — DIPHENHYDRAMINE HCL 50 MG: 25 TABLET ORAL at 08:34

## 2019-09-12 RX ADMIN — Medication 85 G: at 09:11

## 2019-09-12 RX ADMIN — ACETAMINOPHEN 650 MG: 325 TABLET, FILM COATED ORAL at 08:34

## 2019-09-12 NOTE — PROGRESS NOTES
HPI:  Patient is here with her parents  She was hospitalized from 09/03/2019 to 09/06/2019 with diagnosis of acute ITP with 0 platelet count and she had 2 bags of platelets and 1 g Solu-Medrol intravenously daily for 3 days and was discharged home on 100 mg prednisone daily with food and that is what she is taking now  In the hospital platelet count had come up to 59,000 and that has dropped down to 34,000  Patient was sent for stat blood counts after she came to the office and platelet count was [address]  Fading petechiae  No new petechiae  No active bleeding  Some tiredness  Current Outpatient Medications:     ALPRAZolam (XANAX) 0 25 mg tablet, Take 1 tablet (0 25 mg total) by mouth daily as needed for anxiety, Disp: 10 tablet, Rfl: 0    cetirizine (ZyrTEC) 10 mg tablet, Take 10 mg by mouth as needed , Disp: , Rfl:     Multiple Vitamin (MULTIVITAMIN) tablet, Take 1 tablet by mouth daily, Disp: , Rfl:     predniSONE 20 mg tablet, Take 5 tablets (100 mg total) by mouth daily for 14 days (Patient taking differently: Take 100 mg by mouth daily Take 2  50 mg tabs ( 100 mg ) daily), Disp: 70 tablet, Rfl: 0    potassium chloride (MICRO-K) 10 MEQ CR capsule, Take 1 capsule (10 mEq total) by mouth daily, Disp: 15 capsule, Rfl: 0    Allergies   Allergen Reactions    Pollen Extract         No history exists  ROS:  09/11/19 Reviewed 13 systems:  Presently no headaches, seizures, dizziness, diplopia, dysphagia, hoarseness, chest pain, palpitations, shortness of breath, cough, hemoptysis, abdominal pain, nausea, vomiting, change in bowel habits, melena, hematuria, fever, chills, bleeding, bone pains, skin rash, weight loss, arthritic symptoms,  weakness, numbness,  claudication and gait problem  No frequent infections  Not unusually sensitive to heat or cold  No swelling of the ankles  No swollen glands     No GYN symptoms  Patient is anxious   Other symptoms are in HPI        /82 (BP Location: Left arm, Patient Position: Sitting, Cuff Size: Adult)   Pulse 89   Temp 98 7 °F (37 1 °C)   Resp 18   Ht 5' 5 25" (1 657 m)   Wt 122 kg (268 lb)   SpO2 98%   BMI 44 26 kg/m²     Physical Exam:  Alert, oriented, not in distress, no icterus, no oral thrush, no palpable neck mass, clear lung fields, regular heart rate, abdomen  soft and non tender, no palpable abdominal mass, no ascites, no edema of ankles, no calf tenderness, no focal neurological deficit, no skin rash, few petechiae on her lower legs and upper chest, no palpable lymphadenopathy in the neck and axillary areas, good arterial pulses, no clubbing  Patient is anxious  Performance status 1      IMAGING:  No orders to display       LABS:  Results for orders placed or performed in visit on 09/08/19   CBC and differential   Result Value Ref Range    WBC 13 83 (H) 4 31 - 10 16 Thousand/uL    RBC 5 32 (H) 3 81 - 5 12 Million/uL    Hemoglobin 14 3 11 5 - 15 4 g/dL    Hematocrit 44 1 34 8 - 46 1 %    MCV 83 82 - 98 fL    MCH 26 9 26 8 - 34 3 pg    MCHC 32 4 31 4 - 37 4 g/dL    RDW 13 5 11 6 - 15 1 %    Platelets 34 (LL) 861 - 390 Thousands/uL    nRBC 0 /100 WBCs   Comprehensive metabolic panel   Result Value Ref Range    Sodium 136 136 - 145 mmol/L    Potassium 3 3 (L) 3 5 - 5 3 mmol/L    Chloride 104 100 - 108 mmol/L    CO2 28 21 - 32 mmol/L    ANION GAP 4 4 - 13 mmol/L    BUN 15 5 - 25 mg/dL    Creatinine 0 80 0 60 - 1 30 mg/dL    Glucose, Fasting 102 (H) 65 - 99 mg/dL    Calcium 8 9 8 3 - 10 1 mg/dL    AST 12 5 - 45 U/L    ALT 54 12 - 78 U/L    Alkaline Phosphatase 71 46 - 116 U/L    Total Protein 7 1 6 4 - 8 2 g/dL    Albumin 3 5 3 5 - 5 0 g/dL    Total Bilirubin 0 56 0 20 - 1 00 mg/dL    eGFR 102 ml/min/1 73sq m   IgA   Result Value Ref Range    IGA 55 0 (L) 70 0 - 400 0 mg/dL   Manual Differential(PHLEBS Do Not Order)   Result Value Ref Range    Segmented % 64 43 - 75 %    Lymphocytes % 11 (L) 14 - 44 %    Monocytes % 11 4 - 12 %    Eosinophils, % 0 0 - 6 %    Basophils % 0 0 - 1 %    Metamyelocytes% 2 (H) 0 - 1 %    Atypical Lymphocytes % 12 (H) <=0 %    Absolute Neutrophils 8 85 (H) 1 85 - 7 62 Thousand/uL    Lymphocytes Absolute 1 52 0 60 - 4 47 Thousand/uL    Monocytes Absolute 1 52 (H) 0 00 - 1 22 Thousand/uL    Eosinophils Absolute 0 00 0 00 - 0 40 Thousand/uL    Basophils Absolute 0 00 0 00 - 0 10 Thousand/uL    Total Counted      Platelet Estimate Decreased (A) Adequate     Labs, Imaging, & Other studies:   All pertinent labs and imaging studies were personally reviewed    Lab Results   Component Value Date    K 3 3 (L) 09/10/2019     09/10/2019    CO2 28 09/10/2019    BUN 15 09/10/2019    CREATININE 0 80 09/10/2019    GLUF 102 (H) 09/10/2019    CALCIUM 8 9 09/10/2019    AST 12 09/10/2019    ALT 54 09/10/2019    ALKPHOS 71 09/10/2019    EGFR 102 09/10/2019     Lab Results   Component Value Date    WBC 17 31 (H) 09/11/2019    HGB 14 7 09/11/2019    HCT 41 5 09/11/2019    MCV 80 (L) 09/11/2019    PLT 31 (LL) 09/11/2019       Reviewed and discussed with patient  Assessment and plan:  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  Discussed adding IVIG therapy  Discussed mechanism of action, side effects and benefits  Discussed other treatment options  Patient and her family agreed to have IVIG  Arrangements are being made  She will have around 80 g a day for 2 doses and that will be 1 g per kg based on adjusted weight  She is already on steroid and she will have Benadryl and Tylenol to prevent allergic reaction  Blood counts will be checked twice a week to adjust treatment and make changes as needed  All discussed in very much detail  Questions answered  Patient knows to avoid trauma, aspirin and nonsteroidal anti-inflammatory drugs  Any bleeding she will report to the emergency room  Goal is to improve platelet counts and to prevent bleeding  To monitor patient's blood counts and chemistry especially sugar and potassium  Potassium is low at 3 3 and I prescribed 10 mEq potassium daily for 15 days  Prednisone dose will be tapered  Discussed the importance of eating healthy foods, rich in proteins and low in carbohydrates and fat  Patient is capable of self-care       Patient's sister is getting  this Saturday  1  Acute ITP (HCC)    - CBC and differential; Future  - CBC and differential; Standing  - Comprehensive metabolic panel; Standing  - CBC and differential  - Comprehensive metabolic panel  - IgA; Future    2  Petechiae    - CBC and differential; Standing  - Comprehensive metabolic panel; Standing  - CBC and differential  - Comprehensive metabolic panel    3  Hypokalemia    - potassium chloride (MICRO-K) 10 MEQ CR capsule; Take 1 capsule (10 mEq total) by mouth daily  Dispense: 15 capsule; Refill: 0          Patient voiced understanding and agreement in the discussion  Counseling / Coordination of Care: 45 min   Greater than 50% of total time was spent with the patient and / or family counseling and / or coordination of care

## 2019-09-12 NOTE — PROGRESS NOTES
Pt tolerated treatment today without incident  Pt declined AVS but did make future appt for 9/17/19

## 2019-09-16 ENCOUNTER — TELEPHONE (OUTPATIENT)
Dept: HEMATOLOGY ONCOLOGY | Facility: CLINIC | Age: 24
End: 2019-09-16

## 2019-09-16 ENCOUNTER — APPOINTMENT (OUTPATIENT)
Dept: LAB | Age: 24
End: 2019-09-16
Payer: COMMERCIAL

## 2019-09-16 LAB
ALBUMIN SERPL BCP-MCNC: 3.1 G/DL (ref 3.5–5)
ALP SERPL-CCNC: 60 U/L (ref 46–116)
ALT SERPL W P-5'-P-CCNC: 36 U/L (ref 12–78)
ANION GAP SERPL CALCULATED.3IONS-SCNC: 3 MMOL/L (ref 4–13)
AST SERPL W P-5'-P-CCNC: 12 U/L (ref 5–45)
BASOPHILS # BLD MANUAL: 0 THOUSAND/UL (ref 0–0.1)
BASOPHILS NFR MAR MANUAL: 0 % (ref 0–1)
BILIRUB SERPL-MCNC: 1.18 MG/DL (ref 0.2–1)
BUN SERPL-MCNC: 15 MG/DL (ref 5–25)
CALCIUM SERPL-MCNC: 8.7 MG/DL (ref 8.3–10.1)
CHLORIDE SERPL-SCNC: 105 MMOL/L (ref 100–108)
CO2 SERPL-SCNC: 28 MMOL/L (ref 21–32)
CREAT SERPL-MCNC: 0.8 MG/DL (ref 0.6–1.3)
EOSINOPHIL # BLD MANUAL: 0 THOUSAND/UL (ref 0–0.4)
EOSINOPHIL NFR BLD MANUAL: 0 % (ref 0–6)
ERYTHROCYTE [DISTWIDTH] IN BLOOD BY AUTOMATED COUNT: 14.3 % (ref 11.6–15.1)
GFR SERPL CREATININE-BSD FRML MDRD: 102 ML/MIN/1.73SQ M
GLUCOSE P FAST SERPL-MCNC: 72 MG/DL (ref 65–99)
HCT VFR BLD AUTO: 35.7 % (ref 34.8–46.1)
HGB BLD-MCNC: 11.9 G/DL (ref 11.5–15.4)
LYMPHOCYTES # BLD AUTO: 24 % (ref 14–44)
LYMPHOCYTES # BLD AUTO: 3.9 THOUSAND/UL (ref 0.6–4.47)
MCH RBC QN AUTO: 27.8 PG (ref 26.8–34.3)
MCHC RBC AUTO-ENTMCNC: 33.3 G/DL (ref 31.4–37.4)
MCV RBC AUTO: 83 FL (ref 82–98)
MONOCYTES # BLD AUTO: 0.97 THOUSAND/UL (ref 0–1.22)
MONOCYTES NFR BLD: 6 % (ref 4–12)
MYELOCYTES NFR BLD MANUAL: 2 % (ref 0–1)
NEUTROPHILS # BLD MANUAL: 10.72 THOUSAND/UL (ref 1.85–7.62)
NEUTS SEG NFR BLD AUTO: 66 % (ref 43–75)
NRBC BLD AUTO-RTO: 0 /100 WBCS
NRBC BLD AUTO-RTO: 1 /100 WBC (ref 0–2)
PLATELET # BLD AUTO: 196 THOUSANDS/UL (ref 149–390)
PLATELET BLD QL SMEAR: ADEQUATE
PMV BLD AUTO: 12.4 FL (ref 8.9–12.7)
POLYCHROMASIA BLD QL SMEAR: PRESENT
POTASSIUM SERPL-SCNC: 3.7 MMOL/L (ref 3.5–5.3)
PROT SERPL-MCNC: 7.3 G/DL (ref 6.4–8.2)
RBC # BLD AUTO: 4.28 MILLION/UL (ref 3.81–5.12)
RBC MORPH BLD: PRESENT
SODIUM SERPL-SCNC: 136 MMOL/L (ref 136–145)
VARIANT LYMPHS # BLD AUTO: 2 %
WBC # BLD AUTO: 16.24 THOUSAND/UL (ref 4.31–10.16)

## 2019-09-16 PROCEDURE — 85027 COMPLETE CBC AUTOMATED: CPT | Performed by: INTERNAL MEDICINE

## 2019-09-16 PROCEDURE — 80053 COMPREHEN METABOLIC PANEL: CPT | Performed by: INTERNAL MEDICINE

## 2019-09-16 PROCEDURE — 85007 BL SMEAR W/DIFF WBC COUNT: CPT | Performed by: INTERNAL MEDICINE

## 2019-09-16 PROCEDURE — 36415 COLL VENOUS BLD VENIPUNCTURE: CPT | Performed by: INTERNAL MEDICINE

## 2019-09-16 NOTE — TELEPHONE ENCOUNTER
Spoke with Dr Segal Dad and reviewed her platelets are now 070  He stated he would like patient to proceed with second dose of IVIG tomorrow as scheduled but that she can reduce her Prednisone dose 100mg to 50mg PO DAILY  Reviewed with patient and she was appreciative

## 2019-09-16 NOTE — TELEPHONE ENCOUNTER
Call transferred from [address] [place] [address] Cheo Torres CSA from patient asking about her lab results that were done this morning  Labs reviewed with patient, she is now asking as to whether or not she will need transfusion for tomorrow  Please call her back ASAP at [phone]

## 2019-09-17 ENCOUNTER — HOSPITAL ENCOUNTER (INPATIENT)
Facility: HOSPITAL | Age: 24
LOS: 3 days | Discharge: HOME | DRG: 418 | End: 2019-09-21
Attending: EMERGENCY MEDICINE | Admitting: FAMILY MEDICINE
Payer: COMMERCIAL

## 2019-09-17 ENCOUNTER — HOSPITAL ENCOUNTER (OUTPATIENT)
Dept: INFUSION CENTER | Facility: CLINIC | Age: 24
Discharge: HOME | End: 2019-09-17
Payer: COMMERCIAL

## 2019-09-17 VITALS
DIASTOLIC BLOOD PRESSURE: 71 MMHG | RESPIRATION RATE: 18 BRPM | TEMPERATURE: 98.8 F | HEART RATE: 80 BPM | SYSTOLIC BLOOD PRESSURE: 143 MMHG

## 2019-09-17 DIAGNOSIS — D69.3 ACUTE ITP (HCC): Primary | ICD-10-CM

## 2019-09-17 DIAGNOSIS — E66.01 MORBID OBESITY (HCC): ICD-10-CM

## 2019-09-17 DIAGNOSIS — D72.829 LEUKOCYTOSIS: Primary | ICD-10-CM

## 2019-09-17 DIAGNOSIS — D69.3 ACUTE ITP (HCC): ICD-10-CM

## 2019-09-17 DIAGNOSIS — K81.0 ACUTE CHOLECYSTITIS: ICD-10-CM

## 2019-09-17 PROCEDURE — 99285 EMERGENCY DEPT VISIT HI MDM: CPT

## 2019-09-17 PROCEDURE — 81025 URINE PREGNANCY TEST: CPT | Performed by: EMERGENCY MEDICINE

## 2019-09-17 PROCEDURE — 96366 THER/PROPH/DIAG IV INF ADDON: CPT

## 2019-09-17 PROCEDURE — 96365 THER/PROPH/DIAG IV INF INIT: CPT

## 2019-09-17 PROCEDURE — 99285 EMERGENCY DEPT VISIT HI MDM: CPT | Performed by: EMERGENCY MEDICINE

## 2019-09-17 RX ORDER — ACETAMINOPHEN 325 MG/1
650 TABLET ORAL ONCE
Status: CANCELLED | OUTPATIENT
Start: 2019-09-17

## 2019-09-17 RX ORDER — SODIUM CHLORIDE 9 MG/ML
20 INJECTION, SOLUTION INTRAVENOUS ONCE
Status: COMPLETED | OUTPATIENT
Start: 2019-09-17 | End: 2019-09-17

## 2019-09-17 RX ORDER — ACETAMINOPHEN 325 MG/1
650 TABLET ORAL ONCE
Status: COMPLETED | OUTPATIENT
Start: 2019-09-17 | End: 2019-09-17

## 2019-09-17 RX ORDER — SODIUM CHLORIDE 9 MG/ML
20 INJECTION, SOLUTION INTRAVENOUS ONCE
Status: CANCELLED | OUTPATIENT
Start: 2019-09-17

## 2019-09-17 RX ORDER — DIPHENHYDRAMINE HCL 25 MG
50 TABLET ORAL ONCE
Status: CANCELLED | OUTPATIENT
Start: 2019-09-17

## 2019-09-17 RX ORDER — DIPHENHYDRAMINE HCL 25 MG
50 TABLET ORAL ONCE
Status: COMPLETED | OUTPATIENT
Start: 2019-09-17 | End: 2019-09-17

## 2019-09-17 RX ADMIN — Medication 85 G: at 09:04

## 2019-09-17 RX ADMIN — DIPHENHYDRAMINE HCL 50 MG: 25 TABLET ORAL at 08:32

## 2019-09-17 RX ADMIN — ACETAMINOPHEN 650 MG: 325 TABLET, FILM COATED ORAL at 08:33

## 2019-09-17 RX ADMIN — SODIUM CHLORIDE 20 ML/HR: 0.9 INJECTION, SOLUTION INTRAVENOUS at 08:32

## 2019-09-17 NOTE — PLAN OF CARE
Problem: Potential for Falls  Goal: Patient will remain free of falls  Description  INTERVENTIONS:  - Assess patient frequently for physical needs  -  Identify cognitive and physical deficits and behaviors that affect risk of falls    -  [place] fall precautions as indicated by assessment   - Educate patient/family on patient safety including physical limitations  - Instruct patient to call for assistance with activity based on assessment  - Modify environment to reduce risk of injury  - Consider OT/PT consult to assist with strengthening/mobility  Outcome: Progressing

## 2019-09-17 NOTE — DISCHARGE SUMMARY
[facility] CENTRAL Discharge Summary - Zahira Painter Star 30 y o  female MRN: [id number]    [address] [place] [address]  Room / Bed: [facility] 924/[facility] 924-01 Encounter: [id number]    BRIEF OVERVIEW    Admitting Provider: Ema Conroy DO  Discharge Provider: Ema Conroy DO  Primary Care Physician at Discharge: Ema Conroy, [address] [place] [address]  Admission Date: 9/3/2019     Discharge Date: 9/6/2019  Tom Bergeron is a 28-year-old female past medical history of anxiety and seasonal allergies who presents to [facility] for evaluation low platelets  Additional information regarding her presentation can be found in the H&P    the ED, patient was found to have platelet of 0 without signs of active bleeding  Hematology was consulted and they recommended starting Solu-Medrol and platelet transfusion  She received irradiated platelet transfusion overnight  She continued on the IV solumedrol and showed consistent increase in platelet number  She never had any active signs of bleeding while in the hospital   She was also found to have a positive ZEKE with a titer of 40 in a speckled pattern  On the day of discharge her platelets were 01I  She was discahrged in stable condition home with a steroid taper and instructions to follow up with hematology and rhemuatology  Presenting Problem/History of Present Illness  Principal Problem: Thrombocytopenia ([address] )  Active Problems:    Seasonal allergies    Other specified anxiety disorders    Morbid obesity ([facility] [address] )    Petechiae  Resolved Problems:    * No resolved hospital problems  *        Diagnostic Procedures Performed  Imaging Studies:  No results found      Pertinent Labs:     Ref Range & Units 9/3/19 1646   WBC 4 31 - 10 16 Thousand/uL 5 35    RBC 3 81 - 5 12 Million/uL 5 03    Hemoglobin 11 5 - 15 4 g/dL 14 0    Hematocrit 34 8 - 46 1 % 41 8    MCV 82 - 98 fL 83    MCH 26 8 - 34 3 pg 27 8    MCHC 31 4 - 37 4 g/dL 33 5    RDW 11 6 - 15 1 % 13 2    Platelets 188 - 300 Thousands/uL 0Low Panic        Ref Range & Units 9/3/19 1829   ZEKE Negative PositiveAbnormal        Ref Range & Units 9/6/19 0437   WBC 4 31 - 10 16 Thousand/uL 13  19High     RBC 3 81 - 5 12 Million/uL 4 94    Hemoglobin 11 5 - 15 4 g/dL 13 4    Hematocrit 34 8 - 46 1 % 41 2    MCV 82 - 98 fL 83    MCH 26 8 - 34 3 pg 27 1    MCHC 31 4 - 37 4 g/dL 32 5    RDW 11 6 - 15 1 % 13 5    MPV 8 9 - 12 7 fL 14  5High     Platelets 044 - 825 Thousands/uL 59Low           Therapeutic Procedures Performed  none    Test Results Pending at Discharge: none    Medications     Medication List to be Continued at Discharge  Discharge Medication List as of 9/6/2019  4:18 PM      CONTINUE these medications which have NOT CHANGED    Details   ALPRAZolam (XANAX) 0 25 mg tablet Take 1 tablet (0 25 mg total) by mouth daily as needed for anxiety, Starting Mon 7/1/2019, Normal      Multiple Vitamin (MULTIVITAMIN) tablet Take 1 tablet by mouth daily, Historical Med      cetirizine (ZyrTEC) 10 mg tablet Take 10 mg by mouth daily, Historical Med           Discharge Medication List as of 9/6/2019  4:18 PM      START taking these medications    Details   predniSONE 20 mg tablet Take 5 tablets (100 mg total) by mouth daily for 14 days, Starting Sat 9/7/2019, Until Sat 9/21/2019, Print           Discharge Medication List as of 9/6/2019  4:18 PM          Allergies  Allergies   Allergen Reactions    Pollen Extract      Discharge Diet: regular diet  Activity restrictions: none  Discharge Condition: good  Discharged With Lines: no    Discharge Disposition: Home/Self Care    Outpatient Follow-Up  yes  hematology      Code Status: Prior  Advance Directive and Living Will: <no information>  Power of :    POLST:      Discharge  Statement   I spent 30 minutes minutes discharging the patient  This time was spent on the day of discharge   I had direct contact with the patient on the day of discharge       Mello Gonzalez MD  Internal Medicine  PGY1  9/17/2019 1:11 PM

## 2019-09-17 NOTE — PROGRESS NOTES
Patient tolerated IVIG infusion well  Offers no complaints  Pt has no future infusion appointments at this time

## 2019-09-18 ENCOUNTER — TELEPHONE (OUTPATIENT)
Dept: INTERNAL MEDICINE CLINIC | Age: 24
End: 2019-09-18

## 2019-09-18 ENCOUNTER — ANESTHESIA EVENT (INPATIENT)
Dept: PERIOP | Facility: HOSPITAL | Age: 24
DRG: 418 | End: 2019-09-18
Payer: COMMERCIAL

## 2019-09-18 ENCOUNTER — APPOINTMENT (EMERGENCY)
Dept: CT IMAGING | Facility: HOSPITAL | Age: 24
DRG: 418 | End: 2019-09-18
Payer: COMMERCIAL

## 2019-09-18 ENCOUNTER — APPOINTMENT (INPATIENT)
Dept: ULTRASOUND IMAGING | Facility: HOSPITAL | Age: 24
DRG: 418 | End: 2019-09-18
Payer: COMMERCIAL

## 2019-09-18 PROBLEM — K81.0 ACUTE CHOLECYSTITIS: Status: ACTIVE | Noted: 2019-09-18

## 2019-09-18 LAB
ALBUMIN SERPL BCP-MCNC: 3 G/DL (ref 3.5–5)
ALP SERPL-CCNC: 55 U/L (ref 46–116)
ALT SERPL W P-5'-P-CCNC: 37 U/L (ref 12–78)
ANION GAP SERPL CALCULATED.3IONS-SCNC: 7 MMOL/L (ref 4–13)
AST SERPL W P-5'-P-CCNC: 22 U/L (ref 5–45)
BASOPHILS # BLD MANUAL: 0 THOUSAND/UL (ref 0–0.1)
BASOPHILS NFR MAR MANUAL: 0 % (ref 0–1)
BILIRUB SERPL-MCNC: 1.21 MG/DL (ref 0.2–1)
BUN SERPL-MCNC: 18 MG/DL (ref 5–25)
CALCIUM SERPL-MCNC: 8.8 MG/DL (ref 8.3–10.1)
CHLORIDE SERPL-SCNC: 102 MMOL/L (ref 100–108)
CO2 SERPL-SCNC: 26 MMOL/L (ref 21–32)
CREAT SERPL-MCNC: 0.85 MG/DL (ref 0.6–1.3)
EOSINOPHIL # BLD MANUAL: 0 THOUSAND/UL (ref 0–0.4)
EOSINOPHIL NFR BLD MANUAL: 0 % (ref 0–6)
ERYTHROCYTE [DISTWIDTH] IN BLOOD BY AUTOMATED COUNT: 16.5 % (ref 11.6–15.1)
EXT PREG TEST URINE: NEGATIVE
EXT. CONTROL ED NAV: NORMAL
GFR SERPL CREATININE-BSD FRML MDRD: 95 ML/MIN/1.73SQ M
GLUCOSE SERPL-MCNC: 86 MG/DL (ref 65–140)
HCT VFR BLD AUTO: 33.5 % (ref 34.8–46.1)
HGB BLD-MCNC: 11.4 G/DL (ref 11.5–15.4)
LACTATE SERPL-SCNC: 1 MMOL/L (ref 0.5–2)
LG PLATELETS BLD QL SMEAR: PRESENT
LIPASE SERPL-CCNC: 136 U/L (ref 73–393)
LYMPHOCYTES # BLD AUTO: 1.64 THOUSAND/UL (ref 0.6–4.47)
LYMPHOCYTES # BLD AUTO: 9 % (ref 14–44)
MCH RBC QN AUTO: 29.5 PG (ref 26.8–34.3)
MCHC RBC AUTO-ENTMCNC: 34 G/DL (ref 31.4–37.4)
MCV RBC AUTO: 87 FL (ref 82–98)
METAMYELOCYTES NFR BLD MANUAL: 3 % (ref 0–1)
MONOCYTES # BLD AUTO: 0.73 THOUSAND/UL (ref 0–1.22)
MONOCYTES NFR BLD: 4 % (ref 4–12)
MYELOCYTES NFR BLD MANUAL: 1 % (ref 0–1)
NEUTROPHILS # BLD MANUAL: 15.16 THOUSAND/UL (ref 1.85–7.62)
NEUTS BAND NFR BLD MANUAL: 5 % (ref 0–8)
NEUTS SEG NFR BLD AUTO: 78 % (ref 43–75)
NRBC BLD AUTO-RTO: 0 /100 WBCS
PLATELET # BLD AUTO: 280 THOUSANDS/UL (ref 149–390)
PLATELET BLD QL SMEAR: ADEQUATE
PMV BLD AUTO: 11.7 FL (ref 8.9–12.7)
POLYCHROMASIA BLD QL SMEAR: PRESENT
POTASSIUM SERPL-SCNC: 4 MMOL/L (ref 3.5–5.3)
PROT SERPL-MCNC: 9.1 G/DL (ref 6.4–8.2)
RBC # BLD AUTO: 3.87 MILLION/UL (ref 3.81–5.12)
RBC MORPH BLD: PRESENT
SODIUM SERPL-SCNC: 135 MMOL/L (ref 136–145)
TOTAL CELLS COUNTED SPEC: 100
WBC # BLD AUTO: 18.27 THOUSAND/UL (ref 4.31–10.16)

## 2019-09-18 PROCEDURE — 96374 THER/PROPH/DIAG INJ IV PUSH: CPT

## 2019-09-18 PROCEDURE — 85007 BL SMEAR W/DIFF WBC COUNT: CPT | Performed by: EMERGENCY MEDICINE

## 2019-09-18 PROCEDURE — 99222 1ST HOSP IP/OBS MODERATE 55: CPT | Performed by: INTERNAL MEDICINE

## 2019-09-18 PROCEDURE — 74177 CT ABD & PELVIS W/CONTRAST: CPT

## 2019-09-18 PROCEDURE — 96375 TX/PRO/DX INJ NEW DRUG ADDON: CPT

## 2019-09-18 PROCEDURE — 36415 COLL VENOUS BLD VENIPUNCTURE: CPT | Performed by: EMERGENCY MEDICINE

## 2019-09-18 PROCEDURE — 83690 ASSAY OF LIPASE: CPT | Performed by: EMERGENCY MEDICINE

## 2019-09-18 PROCEDURE — 96361 HYDRATE IV INFUSION ADD-ON: CPT

## 2019-09-18 PROCEDURE — 80053 COMPREHEN METABOLIC PANEL: CPT | Performed by: EMERGENCY MEDICINE

## 2019-09-18 PROCEDURE — 85027 COMPLETE CBC AUTOMATED: CPT | Performed by: EMERGENCY MEDICINE

## 2019-09-18 PROCEDURE — 99254 IP/OBS CNSLTJ NEW/EST MOD 60: CPT | Performed by: SURGERY

## 2019-09-18 PROCEDURE — 83605 ASSAY OF LACTIC ACID: CPT | Performed by: EMERGENCY MEDICINE

## 2019-09-18 PROCEDURE — 71260 CT THORAX DX C+: CPT

## 2019-09-18 PROCEDURE — 76705 ECHO EXAM OF ABDOMEN: CPT

## 2019-09-18 RX ORDER — ACETAMINOPHEN 325 MG/1
650 TABLET ORAL EVERY 6 HOURS PRN
Status: DISCONTINUED | OUTPATIENT
Start: 2019-09-18 | End: 2019-09-19

## 2019-09-18 RX ORDER — ONDANSETRON 2 MG/ML
4 INJECTION INTRAMUSCULAR; INTRAVENOUS EVERY 6 HOURS PRN
Status: DISCONTINUED | OUTPATIENT
Start: 2019-09-18 | End: 2019-09-21 | Stop reason: HOSPADM

## 2019-09-18 RX ORDER — ALPRAZOLAM 0.25 MG/1
0.25 TABLET ORAL DAILY PRN
Status: DISCONTINUED | OUTPATIENT
Start: 2019-09-18 | End: 2019-09-21 | Stop reason: HOSPADM

## 2019-09-18 RX ORDER — ONDANSETRON 2 MG/ML
4 INJECTION INTRAMUSCULAR; INTRAVENOUS ONCE
Status: COMPLETED | OUTPATIENT
Start: 2019-09-18 | End: 2019-09-18

## 2019-09-18 RX ORDER — SODIUM CHLORIDE 9 MG/ML
125 INJECTION, SOLUTION INTRAVENOUS CONTINUOUS
Status: DISCONTINUED | OUTPATIENT
Start: 2019-09-18 | End: 2019-09-18

## 2019-09-18 RX ORDER — ACETAMINOPHEN 325 MG/1
650 TABLET ORAL ONCE
Status: COMPLETED | OUTPATIENT
Start: 2019-09-18 | End: 2019-09-18

## 2019-09-18 RX ORDER — SODIUM CHLORIDE, SODIUM LACTATE, POTASSIUM CHLORIDE, CALCIUM CHLORIDE 600; 310; 30; 20 MG/100ML; MG/100ML; MG/100ML; MG/100ML
100 INJECTION, SOLUTION INTRAVENOUS CONTINUOUS
Status: DISCONTINUED | OUTPATIENT
Start: 2019-09-18 | End: 2019-09-19

## 2019-09-18 RX ORDER — LIDOCAINE 50 MG/G
1 PATCH TOPICAL ONCE
Status: COMPLETED | OUTPATIENT
Start: 2019-09-18 | End: 2019-09-18

## 2019-09-18 RX ORDER — CEFAZOLIN SODIUM 2 G/50ML
2000 SOLUTION INTRAVENOUS EVERY 8 HOURS
Status: DISCONTINUED | OUTPATIENT
Start: 2019-09-18 | End: 2019-09-20

## 2019-09-18 RX ORDER — HYDROMORPHONE HCL/PF 1 MG/ML
1 SYRINGE (ML) INJECTION ONCE
Status: COMPLETED | OUTPATIENT
Start: 2019-09-18 | End: 2019-09-18

## 2019-09-18 RX ADMIN — CEFAZOLIN SODIUM 2000 MG: 2 SOLUTION INTRAVENOUS at 15:21

## 2019-09-18 RX ADMIN — IOHEXOL 100 ML: 350 INJECTION, SOLUTION INTRAVENOUS at 01:20

## 2019-09-18 RX ADMIN — METRONIDAZOLE 500 MG: 500 INJECTION, SOLUTION INTRAVENOUS at 23:18

## 2019-09-18 RX ADMIN — MORPHINE SULFATE 2 MG: 2 INJECTION, SOLUTION INTRAMUSCULAR; INTRAVENOUS at 03:35

## 2019-09-18 RX ADMIN — HYDROMORPHONE HYDROCHLORIDE 1 MG: 1 INJECTION, SOLUTION INTRAMUSCULAR; INTRAVENOUS; SUBCUTANEOUS at 00:31

## 2019-09-18 RX ADMIN — MORPHINE SULFATE 2 MG: 2 INJECTION, SOLUTION INTRAMUSCULAR; INTRAVENOUS at 16:56

## 2019-09-18 RX ADMIN — ACETAMINOPHEN 650 MG: 325 TABLET ORAL at 01:26

## 2019-09-18 RX ADMIN — CEFAZOLIN SODIUM 2000 MG: 2 SOLUTION INTRAVENOUS at 22:13

## 2019-09-18 RX ADMIN — ONDANSETRON 4 MG: 2 INJECTION INTRAMUSCULAR; INTRAVENOUS at 00:31

## 2019-09-18 RX ADMIN — METRONIDAZOLE 500 MG: 500 INJECTION, SOLUTION INTRAVENOUS at 16:17

## 2019-09-18 RX ADMIN — LIDOCAINE 1 PATCH: 50 PATCH TOPICAL at 01:30

## 2019-09-18 RX ADMIN — SODIUM CHLORIDE 1000 ML: 0.9 INJECTION, SOLUTION INTRAVENOUS at 00:30

## 2019-09-18 RX ADMIN — SODIUM CHLORIDE, SODIUM LACTATE, POTASSIUM CHLORIDE, AND CALCIUM CHLORIDE 100 ML/HR: .6; .31; .03; .02 INJECTION, SOLUTION INTRAVENOUS at 03:54

## 2019-09-18 NOTE — TELEPHONE ENCOUNTER
Dr Jennifer Reyes,    Patient's mother Alfonso West to let us know that she is back in the hospital and they think it is her Gallbladder now  Patient was admitted to [address] yesterday    They had to cancel her TCM that was with you tomorrow    They will reschedule once she is D/C from this hospital stay

## 2019-09-18 NOTE — SOCIAL WORK
Patient is a readmit as she was IP at [facility] on 9/4/19 at which time she reported to the CM there: "Pt lives alone in a 3rd floor apartment  3 flights of KEIR[address] with a total of approximately 30 steps  Pt performed ADLs independently pta  Pt ambulates independently  No DME  No hx HHC/VNA/STR  Pt is employed  Pt drives  Pt is treated for anxiety by her PCP  No IP admissions  Pt denies hx of D&A tx  Pt PCP is Dr Jaspreet Hernandez  Pt preferred pharmacy is [facility] [address] in [place]  Contact: Lorraine Gupta (mother) [phone] or Maribeth Matias (father) [phone]  No POA or Living Will  Pt family will transport home at time of d/c "     This assigned CM will be following for any/all discharge needs      Ladarius Gilbert, MSW  09/18/2019  2700

## 2019-09-18 NOTE — ASSESSMENT & PLAN NOTE
Plt count currently 280  Recent admission on Sept 3 for thrombocytopenia, plt count 0   Was discharged on Prednisone 100mg; weaned down yesterday to 50mg daily, will continue  Followed outpatient by Hematology, on IVIG infusion outpatient, received dose yesterday 9/17  Avoid ASA and NSAIDs  No a/c for DVT prophylaxis  Monitor CBC  Monitor blood glucose and potassium for hypokalemia

## 2019-09-18 NOTE — ED PROVIDER NOTES
History  Chief Complaint   Patient presents with    Back Pain     Pt reports mid back pain radiates to abdomen and reports "made vomited x1"  Pt reports " it feels very tight"   Abdominal Pain     31 yo female with chronic issues with back pain (typically would take advil for) but recently diagnosed with thrombocytopenia and platelet count of 0 and discharged home 1 week ago  Pt went for 2nd infusion of IVIG today and got done around 1230, felt well  Around 3pm began with mid to lower thoracic back pain which intensified and then epigastric and RUQ abd pain "that feels like it shoots right through"  Pt very uncomfortable appearing and having a hard time differentiating whether pain coming from back to abd or vise versa  History provided by:  Patient   used: No    Back Pain   Location:  Thoracic spine  Quality:  Aching  Radiates to: abd  Pain severity:  Severe  Onset quality:  Gradual  Duration:  8 hours  Timing:  Constant  Progression:  Worsening  Chronicity:  Recurrent  Relieved by:  Nothing  Worsened by:  Nothing  Ineffective treatments:  None tried  Associated symptoms: abdominal pain (epigastric and RUQ)    Associated symptoms: no chest pain, no dysuria, no fever and no headaches    Abdominal pain:     Location:  Epigastric and RUQ    Quality: aching      Severity:  Severe    Onset quality:  Gradual    Duration:  4 hours    Timing:  Constant    Progression:  Worsening    Chronicity:  Recurrent (similar milder episodes over past 6 weeks)  Abdominal Pain   Associated symptoms: no chest pain, no chills, no diarrhea, no dysuria, no fatigue, no fever, no nausea, no shortness of breath, no sore throat, no vaginal bleeding, no vaginal discharge and no vomiting        Prior to Admission Medications   Prescriptions Last Dose Informant Patient Reported? Taking?    ALPRAZolam (XANAX) 0 25 mg tablet  Self No No   Sig: Take 1 tablet (0 25 mg total) by mouth daily as needed for anxiety Multiple Vitamin (MULTIVITAMIN) tablet  Self Yes No   Sig: Take 1 tablet by mouth daily   cetirizine (ZyrTEC) 10 mg tablet  Self Yes No   Sig: Take 10 mg by mouth as needed    potassium chloride (MICRO-K) 10 MEQ CR capsule   No No   Sig: Take 1 capsule (10 mEq total) by mouth daily   predniSONE 20 mg tablet  Self No No   Sig: Take 5 tablets (100 mg total) by mouth daily for 14 days   Patient taking differently: Take 50 mg by mouth daily 50mg daily      Facility-Administered Medications: None       Past Medical History:   Diagnosis Date    Anxiety        Past Surgical History:   Procedure Laterality Date    WISDOM TOOTH EXTRACTION         Family History   Problem Relation Age of Onset    Hypertension Father     Anxiety disorder Maternal Grandmother     Diabetes Paternal Aunt     Diabetes Paternal Uncle      I have reviewed and agree with the history as documented  Social History     Tobacco Use    Smoking status: Never Smoker    Smokeless tobacco: Never Used   Substance Use Topics    Alcohol use: Yes     Comment: rare    Drug use: Never        Review of Systems   Constitutional: Positive for appetite change  Negative for chills, fatigue and fever  HENT: Negative for congestion and sore throat  Eyes: Negative for visual disturbance  Respiratory: Negative for shortness of breath  Cardiovascular: Negative for chest pain  Gastrointestinal: Positive for abdominal pain (epigastric and RUQ)  Negative for diarrhea, nausea and vomiting  Genitourinary: Negative for dysuria, frequency, vaginal bleeding and vaginal discharge  Musculoskeletal: Positive for back pain  Negative for neck pain and neck stiffness  Skin: Negative for pallor and rash  Allergic/Immunologic: Negative for immunocompromised state  Neurological: Negative for light-headedness and headaches  Psychiatric/Behavioral: Negative for confusion  All other systems reviewed and are negative        Physical Exam  Physical Exam Constitutional: She is oriented to person, place, and time  She appears well-developed and well-nourished  No distress  HENT:   Head: Normocephalic and atraumatic  Right Ear: External ear normal    Left Ear: External ear normal    Mouth/Throat: Oropharynx is clear and moist    Eyes: EOM are normal    Neck: Neck supple  Cardiovascular: Normal rate and regular rhythm  No murmur heard  Pulmonary/Chest: Effort normal and breath sounds normal  No respiratory distress  Abdominal: Soft  Bowel sounds are normal  There is tenderness in the right upper quadrant and epigastric area  There is negative Culp's sign  Musculoskeletal: Normal range of motion  Neurological: She is alert and oriented to person, place, and time  Skin: Skin is warm  No rash noted  No pallor  No thoracic skin changes or midline tenderness   Psychiatric: She has a normal mood and affect  Her behavior is normal    Nursing note and vitals reviewed        Vital Signs  ED Triage Vitals   Temperature Pulse Respirations Blood Pressure SpO2   09/17/19 2324 09/17/19 2326 09/17/19 2326 09/17/19 2326 09/17/19 2326   97 6 °F (36 4 °C) 79 18 139/91 99 %      Temp Source Heart Rate Source Patient Position - Orthostatic VS BP Location FiO2 (%)   09/17/19 2324 09/17/19 2326 09/17/19 2326 09/17/19 2326 --   Temporal Monitor Sitting Right arm       Pain Score       09/17/19 2326       8           Vitals:    09/17/19 2326 09/18/19 0131   BP: 139/91 137/82   Pulse: 79 72   Patient Position - Orthostatic VS: Sitting Lying         Visual Acuity      ED Medications  Medications   lidocaine (LIDODERM) 5 % patch 1 patch (1 patch Topical Medication Applied 9/18/19 0130)   sodium chloride 0 9 % bolus 1,000 mL (1,000 mL Intravenous New Bag 9/18/19 0030)   ondansetron (ZOFRAN) injection 4 mg (4 mg Intravenous Given 9/18/19 0031)   HYDROmorphone (DILAUDID) injection 1 mg (1 mg Intravenous Given 9/18/19 0031)   acetaminophen (TYLENOL) tablet 650 mg (650 mg Oral Given 9/18/19 0126)   iohexol (OMNIPAQUE) 350 MG/ML injection (SINGLE-DOSE) 100 mL (100 mL Intravenous Given 9/18/19 0120)       Diagnostic Studies  Results Reviewed     Procedure Component Value Units Date/Time    CBC and differential [[id number]]  (Abnormal) Collected:  09/18/19 0037    Lab Status:  Final result Specimen:  Blood from Arm, Left Updated:  09/18/19 0141     WBC 18 27 Thousand/uL      RBC 3 87 Million/uL      Hemoglobin 11 4 g/dL      Hematocrit 33 5 %      MCV 87 fL      MCH 29 5 pg      MCHC 34 0 g/dL      RDW 16 5 %      MPV 11 7 fL      Platelets 198 Thousands/uL      nRBC 0 /100 WBCs     Narrative: This is an appended report  These results have been appended to a previously verified report  Lactic acid, plasma [[id number]]  (Normal) Collected:  09/18/19 0037    Lab Status:  Final result Specimen:  Blood from Arm, Left Updated:  09/18/19 0106     LACTIC ACID 1 0 mmol/L     Narrative:       Result may be elevated if tourniquet was used during collection      Comprehensive metabolic panel [[id number]]  (Abnormal) Collected:  09/18/19 0037    Lab Status:  Final result Specimen:  Blood from Arm, Left Updated:  09/18/19 0104     Sodium 135 mmol/L      Potassium 4 0 mmol/L      Chloride 102 mmol/L      CO2 26 mmol/L      ANION GAP 7 mmol/L      BUN 18 mg/dL      Creatinine 0 85 mg/dL      Glucose 86 mg/dL      Calcium 8 8 mg/dL      AST 22 U/L      ALT 37 U/L      Alkaline Phosphatase 55 U/L      Total Protein 9 1 g/dL      Albumin 3 0 g/dL      Total Bilirubin 1 21 mg/dL      eGFR 95 ml/min/1 73sq m     Narrative:       [place] guidelines for Chronic Kidney Disease (CKD):     Stage 1 with normal or high GFR (GFR > 90 mL/min/1 73 square meters)    Stage 2 Mild CKD (GFR = 60-89 mL/min/1 73 square meters)    Stage 3A Moderate CKD (GFR = 45-59 mL/min/1 73 square meters)    Stage 3B Moderate CKD (GFR = 30-44 mL/min/1 73 square meters)    Stage 4 Severe CKD (GFR = 15-29 mL/min/1 73 square meters)    Stage 5 End Stage CKD (GFR <15 mL/min/1 73 square meters)  Note: GFR calculation is accurate only with a steady state creatinine    Lipase [[id number]]  (Normal) Collected:  09/18/19 0037    Lab Status:  Final result Specimen:  Blood from Arm, Left Updated:  09/18/19 0104     Lipase 136 u/L     POCT pregnancy, urine [[id number]]  (Normal) Resulted:  09/18/19 0030    Lab Status:  Final result Updated:  09/18/19 0033     EXT PREG TEST UR (Ref: Negative) Negative     Control Valid    POCT urinalysis dipstick [[id number]]     Lab Status:  No result Specimen:  Urine                  CT chest abdomen pelvis w contrast   Final Result by Katia Underwood MD (09/18 1704)      Cholelithiasis with gallbladder wall thickening and pericholecystic inflammatory change compatible with acute cholecystitis  The study was marked in [facility] for immediate notification  Workstation performed: [id number]                    Procedures  Procedures       ED Course  ED Course as of Sep 18 0242   Tue Sep 17, 2019   2352 Pt seen and examined  31 yo female with chronic issues with back pain (typically would take advil for) but recently diagnosed with thrombocytopenia and platelet count of 0 and discharged home 1 week ago  Pt went for 2nd infusion of IVIG today and got done around 1230, felt well  Around 3pm began with mid to lower thoracic back pain which intensified and then epigastric and RUQ abd pain "that feels like it shoots right through"  Pt very uncomfortable  States she has had similar but milder episodes over past 6 weeks  Will give IVF, dilaudid, zofran and check labs, urine  Wed Sep 18, 2019   0040 U preg neg - pt medicated  0057 WBC 18 27       0107 Pt resting much more comfortably - believes abd pain coming from back - will give tylenol and place lidoderm patch       0128 Labs all WNL - pt taken for CT imaging, awaiting results         0147 CT a/p - Cholelithiasis with gallbladder wall thickening and pericholecystic inflammatory change compatible with acute cholecystitis  Reviewed with pt - has been dealing with similar episodes for past 1 5 months  [facility] with Dr Ag Caceres - hold off on any IV abx, continue IVF, pain and nausea control and NPO      0157 34                                  MDM    Disposition  Final diagnoses:   Leukocytosis   Acute cholecystitis     Time reflects when diagnosis was documented in both MDM as applicable and the Disposition within this note     Time User Action Codes Description Comment    9/18/2019  2:09 AM [facility] Add [D72 829] Leukocytosis     9/18/2019  2:09 AM [facility] Add [K81 0] Acute cholecystitis       ED Disposition     ED Disposition Condition Date/Time Comment    Admit Stable Wed Sep 18, 2019  2:24 AM Case was discussed with Rachael Elizondo and the patient's admission status was agreed to be Admission Status: inpatient status to the service of Dr Severiano Seip   Follow-up Information    None         Patient's Medications   Discharge Prescriptions    No medications on file     No discharge procedures on file      ED Provider  Electronically Signed by           Johanny Henao DO  09/18/19 6631

## 2019-09-18 NOTE — PLAN OF CARE
Problem: PAIN - ADULT  Goal: Verbalizes/displays adequate comfort level or baseline comfort level  Description  Interventions:  - Encourage patient to monitor pain and request assistance  - Assess pain using appropriate pain scale  - Administer analgesics based on type and severity of pain and evaluate response  - Implement non-pharmacological measures as appropriate and evaluate response  - Consider cultural and social influences on pain and pain management  - Notify physician/advanced practitioner if interventions unsuccessful or patient reports new pain  Outcome: Progressing     Problem: INFECTION - ADULT  Goal: Absence or prevention of progression during hospitalization  Description  INTERVENTIONS:  - Assess and monitor for signs and symptoms of infection  - Monitor lab/diagnostic results  - Monitor all insertion sites, i e  indwelling lines, tubes, and drains  - Monitor endotracheal if appropriate and nasal secretions for changes in amount and color  - [place] appropriate cooling/warming therapies per order  - Administer medications as ordered  - Instruct and encourage patient and family to use good hand hygiene technique  - Identify and instruct in appropriate isolation precautions for identified infection/condition  Outcome: Progressing  Goal: Absence of fever/infection during neutropenic period  Description  INTERVENTIONS:  - Monitor WBC    Outcome: Progressing     Problem: SAFETY ADULT  Goal: Patient will remain free of falls  Description  INTERVENTIONS:  - Assess patient frequently for physical needs  -  Identify cognitive and physical deficits and behaviors that affect risk of falls    -  [place] fall precautions as indicated by assessment   - Educate patient/family on patient safety including physical limitations  - Instruct patient to call for assistance with activity based on assessment  - Modify environment to reduce risk of injury  - Consider OT/PT consult to assist with strengthening/mobility  Outcome: Progressing  Goal: Maintain or return to baseline ADL function  Description  INTERVENTIONS:  -  Assess patient's ability to carry out ADLs; assess patient's baseline for ADL function and identify physical deficits which impact ability to perform ADLs (bathing, care of mouth/teeth, toileting, grooming, dressing, etc )  - Assess/evaluate cause of self-care deficits   - Assess range of motion  - Assess patient's mobility; develop plan if impaired  - Assess patient's need for assistive devices and provide as appropriate  - Encourage maximum independence but intervene and supervise when necessary  - Involve family in performance of ADLs  - Assess for home care needs following discharge   - Consider OT consult to assist with ADL evaluation and planning for discharge  - Provide patient education as appropriate  Outcome: Progressing  Goal: Maintain or return mobility status to optimal level  Description  INTERVENTIONS:  - Assess patient's baseline mobility status (ambulation, transfers, stairs, etc )    - Identify cognitive and physical deficits and behaviors that affect mobility  - Identify mobility aids required to assist with transfers and/or ambulation (gait belt, sit-to-stand, lift, walker, cane, etc )  - [place] fall precautions as indicated by assessment  - Record patient progress and toleration of activity level on Mobility SBAR; progress patient to next Phase/Stage  - Instruct patient to call for assistance with activity based on assessment  - Consider rehabilitation consult to assist with strengthening/weightbearing, etc   Outcome: Progressing     Problem: DISCHARGE PLANNING  Goal: Discharge to home or other facility with appropriate resources  Description  INTERVENTIONS:  - Identify barriers to discharge w/patient and caregiver  - Arrange for needed discharge resources and transportation as appropriate  - Identify discharge learning needs (meds, wound care, etc )  - Arrange for interpretive services to assist at discharge as needed  - Refer to Case Management Department for coordinating discharge planning if the patient needs post-hospital services based on physician/advanced practitioner order or complex needs related to functional status, cognitive ability, or social support system  Outcome: Progressing     Problem: Knowledge Deficit  Goal: Patient/family/caregiver demonstrates understanding of disease process, treatment plan, medications, and discharge instructions  Description  Complete learning assessment and assess knowledge base  Interventions:  - Provide teaching at level of understanding  - Provide teaching via preferred learning methods  Outcome: Progressing     Problem: Nutrition/Hydration-ADULT  Goal: Nutrient/Hydration intake appropriate for improving, restoring or maintaining nutritional needs  Description  Monitor and assess patient's nutrition/hydration status for malnutrition  Collaborate with interdisciplinary team and initiate plan and interventions as ordered  Monitor patient's weight and dietary intake as ordered or per policy  Utilize nutrition screening tool and intervene as necessary  Determine patient's food preferences and provide high-protein, high-caloric foods as appropriate       INTERVENTIONS:  - Monitor oral intake, urinary output, labs, and treatment plans  - Assess nutrition and hydration status and recommend course of action  - Evaluate amount of meals eaten  - Assist patient with eating if necessary   - Allow adequate time for meals  - Recommend/ encourage appropriate diets, oral nutritional supplements, and vitamin/mineral supplements  - Order, calculate, and assess calorie counts as needed  - Recommend, monitor, and adjust tube feedings and TPN/PPN based on assessed needs  - Assess need for intravenous fluids  - Provide specific nutrition/hydration education as appropriate  - Include patient/family/caregiver in decisions related to nutrition  Outcome: Progressing     Problem: GASTROINTESTINAL - ADULT  Goal: Minimal or absence of nausea and/or vomiting  Description  INTERVENTIONS:  - Administer IV fluids if ordered to ensure adequate hydration  - Maintain NPO status until nausea and vomiting are resolved  - Nasogastric tube if ordered  - Administer ordered antiemetic medications as needed  - Provide nonpharmacologic comfort measures as appropriate  - Advance diet as tolerated, if ordered  - Consider nutrition services referral to assist patient with adequate nutrition and appropriate food choices  Outcome: Progressing  Goal: Maintains adequate nutritional intake  Description  INTERVENTIONS:  - Monitor percentage of each meal consumed  - Identify factors contributing to decreased intake, treat as appropriate  - Assist with meals as needed  - Monitor I&O, weight, and lab values if indicated  - Obtain nutrition services referral as needed  Outcome: Progressing     Problem: METABOLIC, FLUID AND ELECTROLYTES - ADULT  Goal: Electrolytes maintained within normal limits  Description  INTERVENTIONS:  - Monitor labs and assess patient for signs and symptoms of electrolyte imbalances  - Administer electrolyte replacement as ordered  - Monitor response to electrolyte replacements, including repeat lab results as appropriate  - Instruct patient on fluid and nutrition as appropriate  Outcome: Progressing  Goal: Fluid balance maintained  Description  INTERVENTIONS:  - Monitor labs   - Monitor I/O and WT  - Instruct patient on fluid and nutrition as appropriate  - Assess for signs & symptoms of volume excess or deficit  Outcome: Progressing

## 2019-09-18 NOTE — H&P
H&P- Musa Lezama 1995, 30 y o  female MRN: [id number]    Unit/Bed#: [facility] -01 Encounter: [id number]    Primary Care Provider: Nubia Farrell DO   Date and time admitted to hospital: 9/17/2019 11:47 PM      * Acute cholecystitis  Assessment & Plan  CT: Cholelithiasis with gallbladder wall thickening and pericholecystic inflammatory change compatible with acute cholecystitis  Afebrile, denies fever; leukocytosis, could be 2/2 steroid use (on Pred 50mg qd)  As per surgery, hold off abx and get RUQ US  Bowel rest: NPO, IVF  Prn antiemetics  Prn analgesics  Monitor electrolytes  Surgery consult    Acute ITP (HCC)  Assessment & Plan  Plt count currently 280  Recent admission on Sept 3 for thrombocytopenia, plt count 0  Was discharged on Prednisone 100mg; weaned down yesterday to 50mg daily, will continue  Followed outpatient by Hematology, on IVIG infusion outpatient, received dose yesterday 9/17  Avoid ASA and NSAIDs  No a/c for DVT prophylaxis  Monitor CBC  Monitor blood glucose and potassium for hypokalemia    Morbid obesity (HCC)  Assessment & Plan  Encourage wt loss, diet and exercise    Other specified anxiety disorders  Assessment & Plan  Continue prn xanax    VTE Prophylaxis: lowrisk  / reason for no mechanical VTE prophylaxis Ambulate   Code Status:  Full code  POLST: POLST is not applicable to this patient  Discussion with family:  Father at bedside    Anticipated Length of Stay:  Patient will be admitted on an Inpatient basis with an anticipated length of stay of  > 2 midnights  Justification for Hospital Stay:  Acute cholecystitis    Total Time for Visit, including Counseling / Coordination of Care: 45 minutes  Greater than 50% of this total time spent on direct patient counseling and coordination of care      Chief Complaint:   Severe back and abdominal pain    History of Present Illness:    Musa Lezama is a 30 y o  female who presents with c/o severe abdominal and back pain since this afternoon, progressing in intensity, worse over RUQ, radiating to back, had associated nausea, emesis and diarrhea  Describes diarrhea as runny and brown, denies mucus or blood in stools  States she ate today, tolerated meals  Denies fevers  Review of Systems:    Review of Systems   Constitutional: Negative  Respiratory: Negative  Cardiovascular: Negative  Gastrointestinal: Positive for abdominal pain, diarrhea, nausea and vomiting  Negative for abdominal distention and constipation  Genitourinary: Negative  Musculoskeletal: Positive for back pain  Neurological: Negative  Psychiatric/Behavioral: Negative  Past Medical and Surgical History:     Past Medical History:   Diagnosis Date    Anxiety        Past Surgical History:   Procedure Laterality Date    WISDOM TOOTH EXTRACTION         Meds/Allergies:    Prior to Admission medications    Medication Sig Start Date End Date Taking? Authorizing Provider   ALPRAZolam Mosley Shaunna) 0 25 mg tablet Take 1 tablet (0 25 mg total) by mouth daily as needed for anxiety 7/1/19   Rachel Rubi DO   cetirizine (ZyrTEC) 10 mg tablet Take 10 mg by mouth as needed     Historical Provider, MD   Multiple Vitamin (MULTIVITAMIN) tablet Take 1 tablet by mouth daily    Historical Provider, MD   potassium chloride (MICRO-K) 10 MEQ CR capsule Take 1 capsule (10 mEq total) by mouth daily 9/11/19   Luis Casas MD   predniSONE 20 mg tablet Take 5 tablets (100 mg total) by mouth daily for 14 days  Patient taking differently: Take 50 mg by mouth daily 50mg daily 9/7/19 9/21/19  Harry Collins DO     I have reviewed home medications with patient personally  Allergies:    Allergies   Allergen Reactions    Pollen Extract        Social History:     Marital Status: Single   Occupation:  at [address] [place] [address]  Patient Pre-hospital Living Situation:  Resides at home alone  Patient Pre-hospital Level of Mobility:  Ambulatory  Patient Pre-hospital Diet Restrictions:   Substance Use History:   Social History     Substance and Sexual Activity   Alcohol Use Yes    Comment: rare     Social History     Tobacco Use   Smoking Status Never Smoker   Smokeless Tobacco Never Used     Social History     Substance and Sexual Activity   Drug Use Never       Family History:    Family History   Problem Relation Age of Onset    Hypertension Father     Anxiety disorder Maternal Grandmother     Diabetes Paternal Aunt     Diabetes Paternal Uncle        Physical Exam:     Vitals:   Blood Pressure: 163/97 (09/18/19 0315)  Pulse: 69 (09/18/19 0315)  Temperature: 97 6 °F (36 4 °C) (09/17/19 2324)  Temp Source: Temporal (09/17/19 2324)  Respirations: 14 (09/18/19 0257)  Weight - Scale: 121 kg (267 lb 3 2 oz) (09/17/19 2323)  SpO2: 100 % (09/18/19 0315)    Physical Exam   Constitutional: She is oriented to person, place, and time  She appears well-developed and well-nourished  No distress  Morbid obesity   HENT:   Head: Normocephalic and atraumatic  Neck: Neck supple  Cardiovascular: Normal rate, regular rhythm, normal heart sounds and intact distal pulses  No murmur heard  Pulmonary/Chest: Effort normal and breath sounds normal  No stridor  No respiratory distress  She has no wheezes  She has no rales  Abdominal: Soft  Bowel sounds are normal  She exhibits no distension and no mass  There is tenderness  There is no guarding  Tenderness upon palpation of all quadrants, worse over RUQ   Musculoskeletal: She exhibits no edema  Neurological: She is alert and oriented to person, place, and time  Skin: Skin is warm and dry  She is not diaphoretic  Psychiatric: She has a normal mood and affect  Her behavior is normal  Judgment and thought content normal      Additional Data:     Lab Results: I have personally reviewed pertinent reports        Results from last 7 days   Lab Units 09/18/19  0037   WBC Thousand/uL 18 27*   HEMOGLOBIN g/dL 11 4*   HEMATOCRIT % 33 5*   PLATELETS Thousands/uL 280   BANDS PCT % 5   LYMPHO PCT % 9*   MONO PCT % 4   EOS PCT % 0     Results from last 7 days   Lab Units 09/18/19  0037   SODIUM mmol/L 135*   POTASSIUM mmol/L 4 0   CHLORIDE mmol/L 102   CO2 mmol/L 26   BUN mg/dL 18   CREATININE mg/dL 0 85   ANION GAP mmol/L 7   CALCIUM mg/dL 8 8   ALBUMIN g/dL 3 0*   TOTAL BILIRUBIN mg/dL 1 21*   ALK PHOS U/L 55   ALT U/L 37   AST U/L 22   GLUCOSE RANDOM mg/dL 86                 Results from last 7 days   Lab Units 09/18/19  0037   LACTIC ACID mmol/L 1 0       Imaging: I have personally reviewed pertinent reports  CT chest abdomen pelvis w contrast   Final Result by Tiesha Hartman MD (09/18 2696)      Cholelithiasis with gallbladder wall thickening and pericholecystic inflammatory change compatible with acute cholecystitis  The study was marked in [facility] for immediate notification  Workstation performed: [id number]         US right upper quadrant    (Results Pending)       EKG, Pathology, and Other Studies Reviewed on Admission:   · CT    Allscripts / Epic Records Reviewed: Yes     ** Please Note: This note has been constructed using a voice recognition system   **

## 2019-09-18 NOTE — ASSESSMENT & PLAN NOTE
CT: Cholelithiasis with gallbladder wall thickening and pericholecystic inflammatory change compatible with acute cholecystitis  Afebrile, denies fever; leukocytosis, could be 2/2 steroid use (on Pred 50mg qd)  As per surgery, hold off abx and get RUQ US  Bowel rest: NPO, IVF  Prn antiemetics  Prn analgesics  Monitor electrolytes  Surgery consult

## 2019-09-18 NOTE — CONSULTS
Consultation - General Surgery   Sam Haines 30 y o  female MRN: [id number]  Unit/Bed#: [facility] 68 2 -01 Encounter: [id number]    Assessment/Plan     Assessment:  30year old female with hx of thrombocytopenia managed with chronic steroids and IVIG infusions admitted for RUQ and back pain x 1 day  CT scan showing evidence of acute cholecystitis  Plan:    1  Acute calculous cholecystitis   -Pt presenting with new onset of acute, sharp right sided back pain that radiates to the RUQ  -Pt is stable, nontoxic  She appears somewhat uncomfortable on exam    -VSS, afebrile  Leukocytosis of 18 27 however this could be secondary to chronic steroid use  T  Bili elevated at 1 21  -Abdomen is soft, tender to palpation in the RUQ  -CT scan showing cholelithiasis with GB wall thickening and pericholecystic inflammatory changes, compatible with acute cholecystitis   -Ultrasound pending  -Depending on results of ultrasound, pt will likely need surgical intervention  -Continue to trend labs, CBD was WNL on CT scan  -Continue NPO sips w/ meds for now   -Will discuss with Dr Bre Haider     2  Thrombocytopenia   -Previously admitted to the hospital for platelets <19, even reaching 0 at one point  -Pt has been compliant with chronic steroids, IVIG infusions, most recent infusion being yesterday  -Platelets stable and WNL at this time, continue to trend   -Continue steroids  -Discussed with SLIM who will reach out to heme/onc for further suggestions     History of Present Illness     HPI:  Sam Haines is a 30 y o  female who presents with right sided back pain x 1 day  She states that the pain started right after dinner last evening  The pain is described as sharp which originated in the right back and radiates to the RUQ of the abdomen  Denies anything making the pain better or worse  1 episode of emesis last evening  Denies diarrhea, fevers or chills   Denies having pain like this in the past      She has never had any abdominal surgeries in the past  Of note she has a hx of thrombocytopenia for which she was admitted to the hospital for earlier in the month  She follows with a hematologist and takes chronic steroids  She also receives IVIG infusions, most recently being yesterday  Inpatient consult to Acute Care Surgery  Consult performed by: Regi Kraus PA-C  Consult ordered by: GERBER Arriola          Review of Systems   Constitutional: Negative for activity change, appetite change, chills and fever  HENT: Negative  Eyes: Negative  Respiratory: Negative for cough, chest tightness and shortness of breath  Cardiovascular: Negative for chest pain, palpitations and leg swelling  Gastrointestinal: Positive for abdominal pain, nausea and vomiting  Negative for abdominal distention, constipation and diarrhea  Endocrine: Negative  Genitourinary: Negative for dysuria, flank pain and urgency  Musculoskeletal: Negative  Skin: Negative for color change  Allergic/Immunologic: Negative  Neurological: Negative for dizziness, weakness, numbness and headaches  Hematological: Negative  Psychiatric/Behavioral: Negative          Historical Information   Past Medical History:   Diagnosis Date    Anxiety      Past Surgical History:   Procedure Laterality Date    WISDOM TOOTH EXTRACTION       Social History   Social History     Substance and Sexual Activity   Alcohol Use Yes    Comment: rare     Social History     Substance and Sexual Activity   Drug Use Never     Social History     Tobacco Use   Smoking Status Never Smoker   Smokeless Tobacco Never Used     Family History:   Family History   Problem Relation Age of Onset    Hypertension Father     Anxiety disorder Maternal Grandmother     Diabetes Paternal Aunt     Diabetes Paternal Uncle        Meds/Allergies   current meds:   Current Facility-Administered Medications   Medication Dose Route Frequency    acetaminophen (TYLENOL) tablet 650 mg 650 mg Oral Q6H PRN    ALPRAZolam (XANAX) tablet 0 25 mg  0 25 mg Oral Daily PRN    lactated ringers infusion  100 mL/hr Intravenous Continuous    lidocaine (LIDODERM) 5 % patch 1 patch  1 patch Topical Once    morphine injection 2 mg  2 mg Intravenous Q4H PRN    ondansetron (ZOFRAN) injection 4 mg  4 mg Intravenous Q6H PRN    predniSONE tablet 50 mg  50 mg Oral Daily     Allergies   Allergen Reactions    Pollen Extract        Objective   First Vitals:   Blood Pressure: 139/91 (09/17/19 2326)  Pulse: 79 (09/17/19 2326)  Temperature: 97 6 °F (36 4 °C) (09/17/19 2324)  Temp Source: Temporal (09/17/19 2324)  Respirations: 18 (09/17/19 2326)  Weight - Scale: 121 kg (267 lb 3 2 oz) (09/17/19 2323)  SpO2: 99 % (09/17/19 2326)    Current Vitals:   Blood Pressure: 112/68 (09/18/19 0758)  Pulse: 61 (09/18/19 0758)  Temperature: 98 5 °F (36 9 °C) (09/18/19 0758)  Temp Source: Temporal (09/17/19 2324)  Respirations: 16 (09/18/19 0758)  Weight - Scale: 121 kg (267 lb 3 2 oz) (09/17/19 2323)  SpO2: 99 % (09/18/19 0758)      Intake/Output Summary (Last 24 hours) at 9/18/2019 1113  Last data filed at 9/18/2019 0252  Gross per 24 hour   Intake 1000 ml   Output    Net 1000 ml       Invasive Devices     Peripheral Intravenous Line            Peripheral IV 09/18/19 Left Antecubital less than 1 day                Physical Exam   Constitutional: She is oriented to person, place, and time  She appears well-developed and well-nourished  No distress  Pt is laying in hospital bed, in no acute distress  She appears uncomfortable, but nontoxic  HENT:   Head: Normocephalic and atraumatic  Eyes: Pupils are equal, round, and reactive to light  EOM are normal    Neck: Normal range of motion  Neck supple  Cardiovascular: Normal rate, regular rhythm and intact distal pulses  Exam reveals no gallop and no friction rub  No murmur heard  Pulmonary/Chest: Effort normal and breath sounds normal  No respiratory distress     Abdominal: Soft  Bowel sounds are normal  She exhibits no distension and no mass  There is tenderness  There is no rebound and no guarding  TTP in the RUQ   Musculoskeletal: Normal range of motion  Neurological: She is alert and oriented to person, place, and time  Skin: Skin is warm and dry  She is not diaphoretic  Psychiatric: She has a normal mood and affect  Her behavior is normal        Lab Results:   CBC:   Lab Results   Component Value Date    WBC 18 27 (H) 09/18/2019    HGB 11 4 (L) 09/18/2019    HCT 33 5 (L) 09/18/2019    MCV 87 09/18/2019     09/18/2019    MCH 29 5 09/18/2019    MCHC 34 0 09/18/2019    RDW 16 5 (H) 09/18/2019    MPV 11 7 09/18/2019    NRBC 0 09/18/2019   , CMP:   Lab Results   Component Value Date    SODIUM 135 (L) 09/18/2019    K 4 0 09/18/2019     09/18/2019    CO2 26 09/18/2019    BUN 18 09/18/2019    CREATININE 0 85 09/18/2019    CALCIUM 8 8 09/18/2019    AST 22 09/18/2019    ALT 37 09/18/2019    ALKPHOS 55 09/18/2019    EGFR 95 09/18/2019     Imaging: I have personally reviewed pertinent reports  EKG, Pathology, and Other Studies: I have personally reviewed pertinent reports

## 2019-09-18 NOTE — UTILIZATION REVIEW
Initial Clinical Review    Admission: Date/Time/Statement: Inpatient Admission Orders (From admission, onward)     Ordered        09/18/19 0226  Inpatient Admission  Once                   Orders Placed This Encounter   Procedures    Inpatient Admission     Standing Status:   Standing     Number of Occurrences:   1     Order Specific Question:   Admitting Physician     Answer:   Marysol Zacarisa [[id number]]     Order Specific Question:   Level of Care     Answer:   Med Surg [16]     Order Specific Question:   Estimated length of stay     Answer:   More than 2 Midnights     Order Specific Question:   Certification     Answer:   I certify that inpatient services are medically necessary for this patient for a duration of greater than two midnights  See H&P and MD Progress Notes for additional information about the patient's course of treatment  ED Arrival Information     Expected Arrival Acuity Means of Arrival Escorted By Service Admission Type    - 9/17/2019 23:21 Urgent Walk-In Self Hospitalist Urgent    Arrival Complaint    back pain        Chief Complaint   Patient presents with    Back Pain     Pt reports mid back pain radiates to abdomen and reports "made vomited x1"  Pt reports " it feels very tight"   Abdominal Pain     Assessment/Plan:   30 y o  female to ED presents with c/o severe abdominal and back pain since this afternoon, progressing in intensity, worse over RUQ, radiating to back, with  associated nausea, emesis and diarrhea  Also c/o diarrhea as runny and brown  Able to eat today  PMH Anxiety disorders  Admit Inpatient level of care Acute cholecystitis, Acute IT, Morbid obesity  NPO, hold off antibiotics, RUQ US, Iv Fluids, prn antiemetics and analgesics, monitor electrolytes, Surgery consult   On IVIG infusion outpatient, received dose yesterday 9/17, no a/c for DVT prophylaxis, monitor Cbc and blood glucose/ K    9/18 Surgery cons; US pending, trend albs, continue NPO sips w/meds for now, may need surgical intervention  CT scan showing cholelithiasis with GB wall thickening and pericholecystic inflammatory changes, compatible with acute cholecystitis     ED Triage Vitals   Temperature Pulse Respirations Blood Pressure SpO2   09/17/19 2324 09/17/19 2326 09/17/19 2326 09/17/19 2326 09/17/19 2326   97 6 °F (36 4 °C) 79 18 139/91 99 %      Temp Source Heart Rate Source Patient Position - Orthostatic VS BP Location FiO2 (%)   09/17/19 2324 09/17/19 2326 09/17/19 2326 09/17/19 2326 --   Temporal Monitor Sitting Right arm       Pain Score       09/17/19 2326       8        Wt Readings from Last 1 Encounters:   09/17/19 121 kg (267 lb 3 2 oz)     Additional Vital Signs:   Date/Time  Temp  Pulse  Resp  BP  MAP (mmHg)  SpO2  O2 Device    09/18/19 07:58:40  98 5 °F (36 9 °C)  61  16  112/68  83  99 %      09/18/19 03:15:34    69    163/97  119  100 %      09/18/19 0257    77  14  117/71    100 %  None (Room air)    09/18/19 0131    72  16  137/82    100 %  None (Room air)      Pertinent Labs/Diagnostic Test Results:   9/18 CT Chest Abd/ Pelvis - Cholelithiasis with gallbladder wall thickening and pericholecystic inflammatory change compatible with acute cholecystitis  9/18 US RUQ - Cholelithiasis, with gallbladder wall thickening and pericholecystic edema compatible with acute cholecystitis  Normal caliber common bile duct      Results from last 7 days   Lab Units 09/18/19 0037 09/16/19  0719   WBC Thousand/uL 18 27* 16 24*   HEMOGLOBIN g/dL 11 4* 11 9   HEMATOCRIT % 33 5* 35 7   PLATELETS Thousands/uL 280 196   BANDS PCT % 5  --          Results from last 7 days   Lab Units 09/18/19 0037 09/16/19  0719   SODIUM mmol/L 135* 136   POTASSIUM mmol/L 4 0 3 7   CHLORIDE mmol/L 102 105   CO2 mmol/L 26 28   ANION GAP mmol/L 7 3*   BUN mg/dL 18 15   CREATININE mg/dL 0 85 0 80   EGFR ml/min/1 73sq m 95 102   CALCIUM mg/dL 8 8 8 7     Results from last 7 days   Lab Units 09/18/19 0037 09/16/19  0719   AST U/L 22 12   ALT U/L 37 36   ALK PHOS U/L 55 60   TOTAL PROTEIN g/dL 9 1* 7 3   ALBUMIN g/dL 3 0* 3 1*   TOTAL BILIRUBIN mg/dL 1 21* 1 18*         Results from last 7 days   Lab Units 09/18/19  0037   GLUCOSE RANDOM mg/dL 86     Results from last 7 days   Lab Units 09/18/19  0037   LACTIC ACID mmol/L 1 0     Results from last 7 days   Lab Units 09/18/19  0037   LIPASE u/L 136         Results from last 7 days   Lab Units 09/18/19  0037   TOTAL COUNTED  100     ED Treatment:   Medication Administration from 09/17/2019 2321 to 09/18/2019 0310       Date/Time Order Dose Route Action     09/18/2019 0030 sodium chloride 0 9 % bolus 1,000 mL 1,000 mL Intravenous New Bag     09/18/2019 0031 ondansetron (ZOFRAN) injection 4 mg 4 mg Intravenous Given     09/18/2019 0031 HYDROmorphone (DILAUDID) injection 1 mg 1 mg Intravenous Given     09/18/2019 0126 acetaminophen (TYLENOL) tablet 650 mg 650 mg Oral Given     09/18/2019 0130 lidocaine (LIDODERM) 5 % patch 1 patch 1 patch Topical Medication Applied     09/18/2019 0120 iohexol (OMNIPAQUE) 350 MG/ML injection (SINGLE-DOSE) 100 mL 100 mL Intravenous Given        Past Medical History:   Diagnosis Date    Anxiety      Present on Admission:   Acute ITP ([facility] [address] )   Morbid obesity ([facility] [address] )   Acute cholecystitis   Other specified anxiety disorders    Admitting Diagnosis: Acute cholecystitis [K81 0]  Back pain [M54 9]  Leukocytosis [D72 829]  Abdominal pain [R10 9]     Age/Sex: 30 y o  female     Admission Orders:  IP CONSULT TO ACUTE CARE SURGERY  NPO; Sips with meds    Current Facility-Administered Medications:  acetaminophen 650 mg Oral Q6H PRN    ALPRAZolam 0 25 mg Oral Daily PRN    lactated ringers 100 mL/hr Intravenous Continuous Last Rate: 100 mL/hr (09/18/19 0354)   lidocaine 1 patch Topical Once    morphine injection 2 mg Intravenous Q4H PRN 9/18 x1   ondansetron 4 mg Intravenous Q6H PRN    predniSONE 50 mg Oral Daily      Network Utilization Review Department  Phone: [phone]; Fax [phone]  [email or web address]  ATTENTION: Please call with any questions or concerns to [phone]  and carefully listen to the prompts so that you are directed to the right person  Send all requests for admission clinical reviews, approved or denied determinations and any other requests to fax [phone]   All voicemails are confidential

## 2019-09-18 NOTE — ANESTHESIA PREPROCEDURE EVALUATION
Review of Systems/Medical History  Patient summary reviewed  Chart reviewed  No history of anesthetic complications     Cardiovascular  Negative cardio ROS    Pulmonary    Comment: Seasonal allergies      GI/Hepatic      Comment: Cholelithiasis      Negative  ROS        Endo/Other    Obesity    GYN  Not currently pregnant ,          Hematology    Thrombocytopenia,   Comment: On steroid taper from signif thrombocytopenia   prob ITP   Last plt count 280  Musculoskeletal  Negative musculoskeletal ROS        Neurology  Negative neurology ROS      Psychology   Anxiety,              Physical Exam    Airway    Mallampati score: II  TM Distance: >3 FB  Neck ROM: full     Dental   No notable dental hx     Cardiovascular  Comment: Negative ROS, Rhythm: regular, Rate: normal, Cardiovascular exam normal    Pulmonary  Pulmonary exam normal Breath sounds clear to auscultation,     Other Findings        Anesthesia Plan  ASA Score- 2     Anesthesia Type- general with ASA Monitors  Additional Monitors:   Airway Plan: ETT  Plan Factors-    Induction- intravenous  Postoperative Plan-     Informed Consent- Anesthetic plan and risks discussed with patient

## 2019-09-19 ENCOUNTER — ANESTHESIA (INPATIENT)
Dept: PERIOP | Facility: HOSPITAL | Age: 24
DRG: 418 | End: 2019-09-19
Payer: COMMERCIAL

## 2019-09-19 LAB
ANION GAP SERPL CALCULATED.3IONS-SCNC: 8 MMOL/L (ref 4–13)
BASOPHILS # BLD AUTO: 0.01 THOUSANDS/ΜL (ref 0–0.1)
BASOPHILS NFR BLD AUTO: 0 % (ref 0–1)
BUN SERPL-MCNC: 12 MG/DL (ref 5–25)
CALCIUM SERPL-MCNC: 8.9 MG/DL (ref 8.3–10.1)
CHLORIDE SERPL-SCNC: 102 MMOL/L (ref 100–108)
CO2 SERPL-SCNC: 28 MMOL/L (ref 21–32)
CREAT SERPL-MCNC: 0.72 MG/DL (ref 0.6–1.3)
EOSINOPHIL # BLD AUTO: 0.01 THOUSAND/ΜL (ref 0–0.61)
EOSINOPHIL NFR BLD AUTO: 0 % (ref 0–6)
ERYTHROCYTE [DISTWIDTH] IN BLOOD BY AUTOMATED COUNT: 16.8 % (ref 11.6–15.1)
GFR SERPL CREATININE-BSD FRML MDRD: 116 ML/MIN/1.73SQ M
GLUCOSE SERPL-MCNC: 69 MG/DL (ref 65–140)
HCT VFR BLD AUTO: 30.2 % (ref 34.8–46.1)
HGB BLD-MCNC: 10.4 G/DL (ref 11.5–15.4)
IMM GRANULOCYTES # BLD AUTO: 0.35 THOUSAND/UL (ref 0–0.2)
IMM GRANULOCYTES NFR BLD AUTO: 3 % (ref 0–2)
LYMPHOCYTES # BLD AUTO: 2.85 THOUSANDS/ΜL (ref 0.6–4.47)
LYMPHOCYTES NFR BLD AUTO: 23 % (ref 14–44)
MCH RBC QN AUTO: 29.6 PG (ref 26.8–34.3)
MCHC RBC AUTO-ENTMCNC: 34.4 G/DL (ref 31.4–37.4)
MCV RBC AUTO: 86 FL (ref 82–98)
MONOCYTES # BLD AUTO: 1.05 THOUSAND/ΜL (ref 0.17–1.22)
MONOCYTES NFR BLD AUTO: 9 % (ref 4–12)
NEUTROPHILS # BLD AUTO: 8.09 THOUSANDS/ΜL (ref 1.85–7.62)
NEUTS SEG NFR BLD AUTO: 65 % (ref 43–75)
NRBC BLD AUTO-RTO: 1 /100 WBCS
PLATELET # BLD AUTO: 275 THOUSANDS/UL (ref 149–390)
PMV BLD AUTO: 11.6 FL (ref 8.9–12.7)
POTASSIUM SERPL-SCNC: 3.9 MMOL/L (ref 3.5–5.3)
RBC # BLD AUTO: 3.51 MILLION/UL (ref 3.81–5.12)
SODIUM SERPL-SCNC: 138 MMOL/L (ref 136–145)
WBC # BLD AUTO: 12.36 THOUSAND/UL (ref 4.31–10.16)

## 2019-09-19 PROCEDURE — 47562 LAPAROSCOPIC CHOLECYSTECTOMY: CPT | Performed by: SURGERY

## 2019-09-19 PROCEDURE — 80048 BASIC METABOLIC PNL TOTAL CA: CPT | Performed by: NURSE PRACTITIONER

## 2019-09-19 PROCEDURE — 85025 COMPLETE CBC W/AUTO DIFF WBC: CPT | Performed by: NURSE PRACTITIONER

## 2019-09-19 PROCEDURE — 0FT44ZZ RESECTION OF GALLBLADDER, PERCUTANEOUS ENDOSCOPIC APPROACH: ICD-10-PCS | Performed by: SURGERY

## 2019-09-19 PROCEDURE — 88304 TISSUE EXAM BY PATHOLOGIST: CPT | Performed by: PATHOLOGY

## 2019-09-19 PROCEDURE — 99232 SBSQ HOSP IP/OBS MODERATE 35: CPT | Performed by: INTERNAL MEDICINE

## 2019-09-19 PROCEDURE — 47562 LAPAROSCOPIC CHOLECYSTECTOMY: CPT | Performed by: PHYSICIAN ASSISTANT

## 2019-09-19 RX ORDER — ONDANSETRON 2 MG/ML
INJECTION INTRAMUSCULAR; INTRAVENOUS AS NEEDED
Status: DISCONTINUED | OUTPATIENT
Start: 2019-09-19 | End: 2019-09-19 | Stop reason: SURG

## 2019-09-19 RX ORDER — MAGNESIUM HYDROXIDE 1200 MG/15ML
LIQUID ORAL AS NEEDED
Status: DISCONTINUED | OUTPATIENT
Start: 2019-09-19 | End: 2019-09-19 | Stop reason: HOSPADM

## 2019-09-19 RX ORDER — HYDROCODONE BITARTRATE AND ACETAMINOPHEN 5; 325 MG/1; MG/1
1 TABLET ORAL EVERY 4 HOURS PRN
Status: DISCONTINUED | OUTPATIENT
Start: 2019-09-19 | End: 2019-09-21 | Stop reason: HOSPADM

## 2019-09-19 RX ORDER — FENTANYL CITRATE 50 UG/ML
25 INJECTION, SOLUTION INTRAMUSCULAR; INTRAVENOUS
Status: COMPLETED | OUTPATIENT
Start: 2019-09-19 | End: 2019-09-19

## 2019-09-19 RX ORDER — SCOLOPAMINE TRANSDERMAL SYSTEM 1 MG/1
1 PATCH, EXTENDED RELEASE TRANSDERMAL ONCE AS NEEDED
Status: DISCONTINUED | OUTPATIENT
Start: 2019-09-19 | End: 2019-09-19

## 2019-09-19 RX ORDER — SODIUM CHLORIDE 9 MG/ML
INJECTION, SOLUTION INTRAVENOUS CONTINUOUS PRN
Status: DISCONTINUED | OUTPATIENT
Start: 2019-09-19 | End: 2019-09-19 | Stop reason: SURG

## 2019-09-19 RX ORDER — HYDROMORPHONE HCL/PF 1 MG/ML
SYRINGE (ML) INJECTION AS NEEDED
Status: DISCONTINUED | OUTPATIENT
Start: 2019-09-19 | End: 2019-09-19 | Stop reason: SURG

## 2019-09-19 RX ORDER — ACETAMINOPHEN 325 MG/1
650 TABLET ORAL EVERY 6 HOURS PRN
Status: DISCONTINUED | OUTPATIENT
Start: 2019-09-19 | End: 2019-09-21 | Stop reason: HOSPADM

## 2019-09-19 RX ORDER — NEOSTIGMINE METHYLSULFATE 1 MG/ML
INJECTION INTRAVENOUS AS NEEDED
Status: DISCONTINUED | OUTPATIENT
Start: 2019-09-19 | End: 2019-09-19 | Stop reason: SURG

## 2019-09-19 RX ORDER — FENTANYL CITRATE 50 UG/ML
INJECTION, SOLUTION INTRAMUSCULAR; INTRAVENOUS AS NEEDED
Status: DISCONTINUED | OUTPATIENT
Start: 2019-09-19 | End: 2019-09-19 | Stop reason: SURG

## 2019-09-19 RX ORDER — PROPOFOL 10 MG/ML
INJECTION, EMULSION INTRAVENOUS AS NEEDED
Status: DISCONTINUED | OUTPATIENT
Start: 2019-09-19 | End: 2019-09-19 | Stop reason: SURG

## 2019-09-19 RX ORDER — ROCURONIUM BROMIDE 10 MG/ML
INJECTION, SOLUTION INTRAVENOUS AS NEEDED
Status: DISCONTINUED | OUTPATIENT
Start: 2019-09-19 | End: 2019-09-19 | Stop reason: SURG

## 2019-09-19 RX ORDER — HYDROMORPHONE HCL/PF 1 MG/ML
0.5 SYRINGE (ML) INJECTION
Status: DISCONTINUED | OUTPATIENT
Start: 2019-09-19 | End: 2019-09-19 | Stop reason: HOSPADM

## 2019-09-19 RX ORDER — ONDANSETRON 2 MG/ML
4 INJECTION INTRAMUSCULAR; INTRAVENOUS EVERY 6 HOURS PRN
Status: DISCONTINUED | OUTPATIENT
Start: 2019-09-19 | End: 2019-09-19 | Stop reason: HOSPADM

## 2019-09-19 RX ORDER — HYDROCODONE BITARTRATE AND ACETAMINOPHEN 5; 325 MG/1; MG/1
2 TABLET ORAL EVERY 6 HOURS PRN
Status: DISCONTINUED | OUTPATIENT
Start: 2019-09-19 | End: 2019-09-21 | Stop reason: HOSPADM

## 2019-09-19 RX ORDER — DEXAMETHASONE SODIUM PHOSPHATE 10 MG/ML
INJECTION, SOLUTION INTRAMUSCULAR; INTRAVENOUS AS NEEDED
Status: DISCONTINUED | OUTPATIENT
Start: 2019-09-19 | End: 2019-09-19 | Stop reason: SURG

## 2019-09-19 RX ORDER — BUPIVACAINE HYDROCHLORIDE 2.5 MG/ML
INJECTION, SOLUTION EPIDURAL; INFILTRATION; INTRACAUDAL AS NEEDED
Status: DISCONTINUED | OUTPATIENT
Start: 2019-09-19 | End: 2019-09-19 | Stop reason: HOSPADM

## 2019-09-19 RX ORDER — PROMETHAZINE HYDROCHLORIDE 25 MG/ML
12.5 INJECTION, SOLUTION INTRAMUSCULAR; INTRAVENOUS EVERY 4 HOURS PRN
Status: DISCONTINUED | OUTPATIENT
Start: 2019-09-19 | End: 2019-09-19 | Stop reason: HOSPADM

## 2019-09-19 RX ORDER — GLYCOPYRROLATE 0.2 MG/ML
INJECTION INTRAMUSCULAR; INTRAVENOUS AS NEEDED
Status: DISCONTINUED | OUTPATIENT
Start: 2019-09-19 | End: 2019-09-19 | Stop reason: SURG

## 2019-09-19 RX ORDER — MIDAZOLAM HYDROCHLORIDE 1 MG/ML
INJECTION INTRAMUSCULAR; INTRAVENOUS AS NEEDED
Status: DISCONTINUED | OUTPATIENT
Start: 2019-09-19 | End: 2019-09-19 | Stop reason: SURG

## 2019-09-19 RX ADMIN — ONDANSETRON 4 MG: 2 INJECTION INTRAMUSCULAR; INTRAVENOUS at 09:05

## 2019-09-19 RX ADMIN — MORPHINE SULFATE 2 MG: 2 INJECTION, SOLUTION INTRAMUSCULAR; INTRAVENOUS at 11:19

## 2019-09-19 RX ADMIN — GLYCOPYRROLATE 0.4 MG: 0.2 INJECTION INTRAMUSCULAR; INTRAVENOUS at 09:19

## 2019-09-19 RX ADMIN — MORPHINE SULFATE 2 MG: 2 INJECTION, SOLUTION INTRAMUSCULAR; INTRAVENOUS at 23:28

## 2019-09-19 RX ADMIN — FENTANYL CITRATE 25 MCG: 50 INJECTION INTRAMUSCULAR; INTRAVENOUS at 09:54

## 2019-09-19 RX ADMIN — MORPHINE SULFATE 2 MG: 2 INJECTION, SOLUTION INTRAMUSCULAR; INTRAVENOUS at 18:51

## 2019-09-19 RX ADMIN — SODIUM CHLORIDE, SODIUM LACTATE, POTASSIUM CHLORIDE, AND CALCIUM CHLORIDE 100 ML/HR: .6; .31; .03; .02 INJECTION, SOLUTION INTRAVENOUS at 02:23

## 2019-09-19 RX ADMIN — FENTANYL CITRATE 100 MCG: 50 INJECTION, SOLUTION INTRAMUSCULAR; INTRAVENOUS at 08:29

## 2019-09-19 RX ADMIN — SODIUM CHLORIDE: 0.9 INJECTION, SOLUTION INTRAVENOUS at 09:14

## 2019-09-19 RX ADMIN — CEFAZOLIN SODIUM 2000 MG: 2 SOLUTION INTRAVENOUS at 14:27

## 2019-09-19 RX ADMIN — MORPHINE SULFATE 2 MG: 2 INJECTION, SOLUTION INTRAMUSCULAR; INTRAVENOUS at 11:53

## 2019-09-19 RX ADMIN — CEFAZOLIN SODIUM 2000 MG: 2 SOLUTION INTRAVENOUS at 08:18

## 2019-09-19 RX ADMIN — FENTANYL CITRATE 25 MCG: 50 INJECTION INTRAMUSCULAR; INTRAVENOUS at 09:44

## 2019-09-19 RX ADMIN — SCOPALAMINE 1 PATCH: 1 PATCH, EXTENDED RELEASE TRANSDERMAL at 08:17

## 2019-09-19 RX ADMIN — NEOSTIGMINE METHYLSULFATE 3 MG: 1 INJECTION, SOLUTION INTRAVENOUS at 09:19

## 2019-09-19 RX ADMIN — PREDNISONE 50 MG: 10 TABLET ORAL at 10:51

## 2019-09-19 RX ADMIN — CEFAZOLIN SODIUM 2000 MG: 2 SOLUTION INTRAVENOUS at 22:21

## 2019-09-19 RX ADMIN — MORPHINE SULFATE 2 MG: 2 INJECTION, SOLUTION INTRAMUSCULAR; INTRAVENOUS at 15:01

## 2019-09-19 RX ADMIN — CEFAZOLIN SODIUM 2000 MG: 2 SOLUTION INTRAVENOUS at 06:12

## 2019-09-19 RX ADMIN — FENTANYL CITRATE 50 MCG: 50 INJECTION, SOLUTION INTRAMUSCULAR; INTRAVENOUS at 08:44

## 2019-09-19 RX ADMIN — FENTANYL CITRATE 25 MCG: 50 INJECTION INTRAMUSCULAR; INTRAVENOUS at 10:04

## 2019-09-19 RX ADMIN — FENTANYL CITRATE 25 MCG: 50 INJECTION INTRAMUSCULAR; INTRAVENOUS at 10:14

## 2019-09-19 RX ADMIN — METRONIDAZOLE 500 MG: 500 INJECTION, SOLUTION INTRAVENOUS at 15:03

## 2019-09-19 RX ADMIN — ROCURONIUM BROMIDE 50 MG: 100 INJECTION, SOLUTION INTRAVENOUS at 08:29

## 2019-09-19 RX ADMIN — MIDAZOLAM 2 MG: 1 INJECTION INTRAMUSCULAR; INTRAVENOUS at 08:18

## 2019-09-19 RX ADMIN — METRONIDAZOLE 500 MG: 500 INJECTION, SOLUTION INTRAVENOUS at 23:28

## 2019-09-19 RX ADMIN — PROPOFOL 200 MG: 10 INJECTION, EMULSION INTRAVENOUS at 08:29

## 2019-09-19 RX ADMIN — DEXAMETHASONE SODIUM PHOSPHATE 8 MG: 10 INJECTION, SOLUTION INTRAMUSCULAR; INTRAVENOUS at 08:29

## 2019-09-19 RX ADMIN — HYDROMORPHONE HYDROCHLORIDE 0.5 MG: 1 INJECTION, SOLUTION INTRAMUSCULAR; INTRAVENOUS; SUBCUTANEOUS at 09:05

## 2019-09-19 RX ADMIN — FENTANYL CITRATE 50 MCG: 50 INJECTION, SOLUTION INTRAMUSCULAR; INTRAVENOUS at 09:02

## 2019-09-19 RX ADMIN — METRONIDAZOLE 500 MG: 500 INJECTION, SOLUTION INTRAVENOUS at 07:12

## 2019-09-19 RX ADMIN — HYDROCODONE BITARTRATE AND ACETAMINOPHEN 2 TABLET: 5; 325 TABLET ORAL at 14:22

## 2019-09-19 RX ADMIN — SODIUM CHLORIDE, SODIUM LACTATE, POTASSIUM CHLORIDE, AND CALCIUM CHLORIDE 100 ML/HR: .6; .31; .03; .02 INJECTION, SOLUTION INTRAVENOUS at 14:27

## 2019-09-19 NOTE — ASSESSMENT & PLAN NOTE
· Status post cholecystectomy  · Pain control  · Diet as tolerated  · Continue perioperative antibiotics as prescribed  · Surgical follow-up

## 2019-09-19 NOTE — OP NOTE
OPERATIVE REPORT  PATIENT NAME: Libby Vásquez    :  1995  MRN: [id number]  Pt Location: [facility] OR ROOM 06    SURGERY DATE: 2019    Surgeon(s) and Role:     * Felix Zhang MD - Primary     Nydia Rae PA-C - Assisting    Preop Diagnosis:  Acute cholecystitis [K81 0]  Morbid obesity ([facility] [address] ) [E66 01]    Post-Op Diagnosis Codes:     * Acute cholecystitis [K81 0]     * Morbid obesity ([address] ) [E66 01]    Procedure(s) (LRB):  CHOLECYSTECTOMY LAPAROSCOPIC (N/A)    Specimen(s):  ID Type Source Tests Collected by Time Destination   1 : GALLBLADDER Tissue Gallbladder TISSUE EXAM Felix Zhang MD 2019 0913        Estimated Blood Loss:   Minimal    Drains:  * No LDAs found *    Anesthesia Type:   General    Operative Indications:  Acute cholecystitis [K81 0]  Morbid obesity ([facility] [address] ) [E66 01]      Operative Findings:  Acute cholecystitis    Complications:   None    Procedure and Technique:    The patient was seen again in the Holding Room  The risks, benefits, complications, treatment options, and expected outcomes were discussed with the patient  The possibilities of reaction to medication, pulmonary aspiration, perforation of viscus, bleeding, recurrent infection, finding a normal gallbladder, the need for additional procedures, failure to diagnose a condition, the possible need to convert to an open procedure, and creating a complication requiring transfusion or operation were discussed with the patient  The patient and/or family concurred with the proposed plan, giving informed consent  The site of surgery properly noted/marked  The patient was taken to Operating Room, identified as Libby Vásquez  and the procedure verified as Laparoscopic Cholecystectomy with possible Intraoperative Cholangiogram  A Time Out was held after prepping and draping in sterile fashion  The above information was confirmed  Prior to the induction of general anesthesia, antibiotic prophylaxis was administered   General endotracheal anesthesia was then administered and tolerated well  After the induction, the abdomen was prepped in the usual sterile fashion  The patient was positioned in the supine position, along with some reverse Trendelenburg  Local anesthetic agent was injected into the skin near the umbilicus and an incision made  The midline fascia was incised and the open technique was used to introduce a  port under direct vision  Pneumoperitoneum was then created with CO2 and was tolerated well without any adverse changes in the patient's vital signs  Additional trocars were introduced under direct vision  All skin incisions were infiltrated with a local anesthetic agent before making the incision and placing the trocars  The patient was placed in reverse Trendelenburg position  The gallbladder was identified, the fundus grasped and retracted cephalad  Adhesions were lysed bluntly and with the electrocautery where indicated, taking care not to injure any adjacent organs or viscus  The infundibulum was grasped and retracted laterally, exposing the peritoneum overlying the triangle of Calot  This was then dissected anteriorily and posteriorly and exposed in a blunt fashion or using cautery where appropriate  The cystic duct was clearly identified and  dissected circumferentially, as was the cystic artery, as the only two tubular structures leading into the gallbladder   The critical view of the [address] was identified  The posterior aspect of the gallbladder was lifted off the cystic plate, to insure that there were no posterior structres behind the gallbladder  Once this was all clearly identified, the cystic duct was then doubly ligated with surgical clips and/or Endoloop suture on the patient side and singly clipped on the gallbladder side and divided  The cystic artery was re-identified,  ligated with clips and divided as well    The gallbladder was dissected from the liver bed in retrograde fashion with the electrocautery  The gallbladder was placed into an endocatch bag and secured  The liver bed was irrigated and inspected  Hemostasis was achieved with the electrocautery  Copious irrigation was utilized and was repeatedly aspirated until clear  The camera was then switched to the lateral port and directed back to the midline  The specimen was removed under direct visualization from the midline incision  The fascia was then closed using the renfac suture passer and a figure of eight 0 vicryl suture  Pneumoperitoneum was completely reduced after viewing removal of the trocars under direct vision  The wound was thoroughly irrigated  The skin was then closed with 4-0 monocryl and histacryl  Instrument, sponge, and needle counts were correct at closure and at the conclusion of the case  PA is medically necessary for the surgical safety of the case, including suturing, retracting, and hemostasis       I was present for the entire procedure and A qualified resident physician was not available    Patient Disposition:  PACU     SIGNATURE: Irina Parker MD  DATE: September 19, 2019  TIME: 9:20 AM

## 2019-09-19 NOTE — PLAN OF CARE
Problem: PAIN - ADULT  Goal: Verbalizes/displays adequate comfort level or baseline comfort level  Description  Interventions:  - Encourage patient to monitor pain and request assistance  - Assess pain using appropriate pain scale  - Administer analgesics based on type and severity of pain and evaluate response  - Implement non-pharmacological measures as appropriate and evaluate response  - Consider cultural and social influences on pain and pain management  - Notify physician/advanced practitioner if interventions unsuccessful or patient reports new pain  Outcome: Progressing     Problem: INFECTION - ADULT  Goal: Absence or prevention of progression during hospitalization  Description  INTERVENTIONS:  - Assess and monitor for signs and symptoms of infection  - Monitor lab/diagnostic results  - Monitor all insertion sites, i e  indwelling lines, tubes, and drains  - Monitor endotracheal if appropriate and nasal secretions for changes in amount and color  - [place] appropriate cooling/warming therapies per order  - Administer medications as ordered  - Instruct and encourage patient and family to use good hand hygiene technique  - Identify and instruct in appropriate isolation precautions for identified infection/condition  Outcome: Progressing  Goal: Absence of fever/infection during neutropenic period  Description  INTERVENTIONS:  - Monitor WBC    Outcome: Progressing     Problem: SAFETY ADULT  Goal: Patient will remain free of falls  Description  INTERVENTIONS:  - Assess patient frequently for physical needs  -  Identify cognitive and physical deficits and behaviors that affect risk of falls    -  [place] fall precautions as indicated by assessment   - Educate patient/family on patient safety including physical limitations  - Instruct patient to call for assistance with activity based on assessment  - Modify environment to reduce risk of injury  - Consider OT/PT consult to assist with strengthening/mobility  Outcome: Progressing  Goal: Maintain or return to baseline ADL function  Description  INTERVENTIONS:  -  Assess patient's ability to carry out ADLs; assess patient's baseline for ADL function and identify physical deficits which impact ability to perform ADLs (bathing, care of mouth/teeth, toileting, grooming, dressing, etc )  - Assess/evaluate cause of self-care deficits   - Assess range of motion  - Assess patient's mobility; develop plan if impaired  - Assess patient's need for assistive devices and provide as appropriate  - Encourage maximum independence but intervene and supervise when necessary  - Involve family in performance of ADLs  - Assess for home care needs following discharge   - Consider OT consult to assist with ADL evaluation and planning for discharge  - Provide patient education as appropriate  Outcome: Progressing  Goal: Maintain or return mobility status to optimal level  Description  INTERVENTIONS:  - Assess patient's baseline mobility status (ambulation, transfers, stairs, etc )    - Identify cognitive and physical deficits and behaviors that affect mobility  - Identify mobility aids required to assist with transfers and/or ambulation (gait belt, sit-to-stand, lift, walker, cane, etc )  - [place] fall precautions as indicated by assessment  - Record patient progress and toleration of activity level on Mobility SBAR; progress patient to next Phase/Stage  - Instruct patient to call for assistance with activity based on assessment  - Consider rehabilitation consult to assist with strengthening/weightbearing, etc   Outcome: Progressing     Problem: DISCHARGE PLANNING  Goal: Discharge to home or other facility with appropriate resources  Description  INTERVENTIONS:  - Identify barriers to discharge w/patient and caregiver  - Arrange for needed discharge resources and transportation as appropriate  - Identify discharge learning needs (meds, wound care, etc )  - Arrange for interpretive services to assist at discharge as needed  - Refer to Case Management Department for coordinating discharge planning if the patient needs post-hospital services based on physician/advanced practitioner order or complex needs related to functional status, cognitive ability, or social support system  Outcome: Progressing     Problem: Knowledge Deficit  Goal: Patient/family/caregiver demonstrates understanding of disease process, treatment plan, medications, and discharge instructions  Description  Complete learning assessment and assess knowledge base  Interventions:  - Provide teaching at level of understanding  - Provide teaching via preferred learning methods  Outcome: Progressing     Problem: Nutrition/Hydration-ADULT  Goal: Nutrient/Hydration intake appropriate for improving, restoring or maintaining nutritional needs  Description  Monitor and assess patient's nutrition/hydration status for malnutrition  Collaborate with interdisciplinary team and initiate plan and interventions as ordered  Monitor patient's weight and dietary intake as ordered or per policy  Utilize nutrition screening tool and intervene as necessary  Determine patient's food preferences and provide high-protein, high-caloric foods as appropriate       INTERVENTIONS:  - Monitor oral intake, urinary output, labs, and treatment plans  - Assess nutrition and hydration status and recommend course of action  - Evaluate amount of meals eaten  - Assist patient with eating if necessary   - Allow adequate time for meals  - Recommend/ encourage appropriate diets, oral nutritional supplements, and vitamin/mineral supplements  - Order, calculate, and assess calorie counts as needed  - Recommend, monitor, and adjust tube feedings and TPN/PPN based on assessed needs  - Assess need for intravenous fluids  - Provide specific nutrition/hydration education as appropriate  - Include patient/family/caregiver in decisions related to nutrition  Outcome: Progressing     Problem: GASTROINTESTINAL - ADULT  Goal: Minimal or absence of nausea and/or vomiting  Description  INTERVENTIONS:  - Administer IV fluids if ordered to ensure adequate hydration  - Maintain NPO status until nausea and vomiting are resolved  - Nasogastric tube if ordered  - Administer ordered antiemetic medications as needed  - Provide nonpharmacologic comfort measures as appropriate  - Advance diet as tolerated, if ordered  - Consider nutrition services referral to assist patient with adequate nutrition and appropriate food choices  Outcome: Progressing  Goal: Maintains adequate nutritional intake  Description  INTERVENTIONS:  - Monitor percentage of each meal consumed  - Identify factors contributing to decreased intake, treat as appropriate  - Assist with meals as needed  - Monitor I&O, weight, and lab values if indicated  - Obtain nutrition services referral as needed  Outcome: Progressing     Problem: METABOLIC, FLUID AND ELECTROLYTES - ADULT  Goal: Electrolytes maintained within normal limits  Description  INTERVENTIONS:  - Monitor labs and assess patient for signs and symptoms of electrolyte imbalances  - Administer electrolyte replacement as ordered  - Monitor response to electrolyte replacements, including repeat lab results as appropriate  - Instruct patient on fluid and nutrition as appropriate  Outcome: Progressing  Goal: Fluid balance maintained  Description  INTERVENTIONS:  - Monitor labs   - Monitor I/O and WT  - Instruct patient on fluid and nutrition as appropriate  - Assess for signs & symptoms of volume excess or deficit  Outcome: Progressing

## 2019-09-19 NOTE — ASSESSMENT & PLAN NOTE
Plt count currently normal at 275  Status post IVIG infusion on 09/17/2019  Continue prednisone 50 mg daily  Outpatient Hematology follow-up  Recheck CBC in [facility]

## 2019-09-19 NOTE — DISCHARGE INSTRUCTIONS
[facility]  Post-Operative Care Instructions  Dr Neida Chan MD, [place] [address]  [phone]    1  General: You will feel pulling sensations around the wound or funny aches and pains around the incisions  This is normal  Even minor surgery is a change in your body and this is your bodys way of reaction to it  If you have had abdominal surgery, it may help to support the incision with a small pillow or blanket for comfort when moving or coughing  2  Wound care:  Okay to shower  The glue will fall off over the next week or 2     3  Water: You may shower over the wound  Do not bathe or use a pool or hot tub until cleared by the physician  4  Activity: You may go up and down stairs, walk as much as you are comfortable, but walk at least 3 times each day  If you have had abdominal surgery, do not lift anything heavier than 15 pounds for at least 2-4 weeks, unless cleared by the doctor  5  Diet: You may resume a regular diet  If you had a same-day surgery or overnight stay surgery, he may wish to eat lightly for a few days: soups, crackers, and sandwiches  You may resume a regular diet when ready  6  Medications: Resume all of your previous medications, unless told otherwise by the doctor  Avoid aspirin products for 2-3 days after the date of surgery  You may, at that time, began to take them again  Tylenol or Ibuprofen are  always fine, unless you are taking any narcotic pain medication containing Tylenol (such as Percocet, Darvocet, Vicodin, or anything containing acetaminophen)  Do not take Tylenol if you're taking these medications  You do not need to take the narcotic pain medications unless you are having significant pain and discomfort  7  Driving: You will need someone to drive you home on the day of surgery  Do not drive or make any important decisions while on narcotic pain medication or 24 hours and after anesthesia or sedation for surgery   Generally, you may drive when your off all narcotic pain medications  8  Upset Stomach: You may take Maalox, Tums, or similar items for an upset stomach  If your narcotic pain medication causes an upset stomach, do not take it on an empty stomach  Try taking it with at least some crackers or toast      9  Constipation: Patients often experienced constipation after surgery  You may take over-the-counter medication for this, such as Metamucil, Senokot, Dulcolax, milk of magnesia, etc  You may take a suppository unless you have had anorectal surgery such as a procedure on your hemorrhoids  If you experience significant nausea or vomiting after abdominal surgery, call the office before trying any of these medications  10  Call the office: If you are experiencing any of the following, fevers above 101 5°, significant nausea or vomiting, if the wound develops drainage and/or is excessive redness around the wound, or if you have significant diarrhea or other worsening symptoms  11  Pain: You may be given a prescription for pain  This will be given to the hospital, the day of surgery  12  Sexual Activity: You may resume sexual activity when you feel ready and comfortable and your incision is sealed and healed without apparent infection risk  13  Urination: If you haven't urinated in 6 hours, go directly to the ER for evaluation for urinary retention  14  Follow-up in 2 weeks

## 2019-09-19 NOTE — PROGRESS NOTES
Progress Note Jacquline Galeazzi 1995, 30 y o  female MRN: [id number]    Unit/Bed#: [facility] 2 -01 Encounter: [id number]    Primary Care Provider: John Hannah DO   Date and time admitted to hospital: 2019 11:47 PM        * Acute cholecystitis  Assessment & Plan  · Status post cholecystectomy  · Pain control  · Diet as tolerated  · Continue perioperative antibiotics as prescribed  · Surgical follow-up    Acute ITP (HCC)  Assessment & Plan  Plt count currently normal at 275  Status post IVIG infusion on 2019  Continue prednisone 50 mg daily  Outpatient Hematology follow-up  Recheck CBC in a m  Morbid obesity ([address] )  Assessment & Plan  Will benefit from weight loss diet and exercise    Other specified anxiety disorders  Assessment & Plan  Continue prn xanax      VTE Pharmacologic Prophylaxis:   Pharmacologic: None  Mechanical VTE Prophylaxis in Place: Yes    Discussions with Specialists or Other Care Team Provider:  Spoke with Dr Naif Franklin    Time Spent for Care: 20 minutes  More than 50% of total time spent on counseling and coordination of care as described above  Current Length of Stay: 1 day(s)    Current Patient Status: Inpatient   Certification Statement: The patient will continue to require additional inpatient hospital stay due to Postoperative pain    Discharge Plan:  In 24 hours    Code Status: Level 1 - Full Code      Subjective:   Patient seen and examined earlier shortly after her procedure  She was still having pain in the right upper quadrant less than 1 hour after receiving 2 mg of morphine  She has no nausea or vomiting    Objective:     Vitals:   Temp (24hrs), Av 4 °F (36 9 °C), Min:97 9 °F (36 6 °C), Max:98 8 °F (37 1 °C)    Temp:  [97 9 °F (36 6 °C)-98 8 °F (37 1 °C)] 98 7 °F (37 1 °C)  HR:  [54-78] 60  Resp:  [13-20] 18  BP: (107-136)/(57-95) 125/73  SpO2:  [94 %-100 %] 94 %  Body mass index is 44 12 kg/m²       Input and Output Summary (last 24 hours): Intake/Output Summary (Last 24 hours) at 9/19/2019 1414  Last data filed at 9/19/2019 0934  Gross per 24 hour   Intake 3898 33 ml   Output    Net 3898 33 ml       Physical Exam:     Physical Exam   Constitutional: She is oriented to person, place, and time  She appears well-developed  No distress  HENT:   Head: Normocephalic and atraumatic  Eyes: No scleral icterus  Neck: No JVD present  Cardiovascular: Regular rhythm  Exam reveals no gallop and no friction rub  No murmur heard  Pulmonary/Chest: Effort normal  No stridor  No respiratory distress  Abdominal: Soft  Bowel sounds are normal  There is tenderness  Musculoskeletal: She exhibits no edema or deformity  Neurological: She is alert and oriented to person, place, and time  Skin: Skin is warm and dry  She is not diaphoretic  Psychiatric: She has a normal mood and affect  Additional Data:     Labs:    Results from last 7 days   Lab Units 09/19/19  0639 09/18/19  0037   WBC Thousand/uL 12 36* 18 27*   HEMOGLOBIN g/dL 10 4* 11 4*   HEMATOCRIT % 30 2* 33 5*   PLATELETS Thousands/uL 275 280   BANDS PCT %  --  5   NEUTROS PCT % 65  --    LYMPHS PCT % 23  --    LYMPHO PCT %  --  9*   MONOS PCT % 9  --    MONO PCT %  --  4   EOS PCT % 0 0     Results from last 7 days   Lab Units 09/19/19  0532 09/18/19  0037   SODIUM mmol/L 138 135*   POTASSIUM mmol/L 3 9 4 0   CHLORIDE mmol/L 102 102   CO2 mmol/L 28 26   BUN mg/dL 12 18   CREATININE mg/dL 0 72 0 85   ANION GAP mmol/L 8 7   CALCIUM mg/dL 8 9 8 8   ALBUMIN g/dL  --  3 0*   TOTAL BILIRUBIN mg/dL  --  1 21*   ALK PHOS U/L  --  55   ALT U/L  --  37   AST U/L  --  22   GLUCOSE RANDOM mg/dL 69 86                 Results from last 7 days   Lab Units 09/18/19  0037   LACTIC ACID mmol/L 1 0           * I Have Reviewed All Lab Data Listed Above  * Additional Pertinent Lab Tests Reviewed:  All Labs Within Last 24 Hours Reviewed    Imaging:    Imaging Reports Reviewed Today Include:  Ultrasound of the abdomen    Recent Cultures (last 7 days):           Last 24 Hours Medication List:     Current Facility-Administered Medications:  acetaminophen 650 mg Oral Q6H PRN Kaya Li MD    ALPRAZolam 0 25 mg Oral Daily PRN Kaya Li MD    cefazolin 2,000 mg Intravenous Q8H Kaya Li MD Last Rate: 2,000 mg (09/19/19 0612)   HYDROcodone-acetaminophen 1 tablet Oral Q4H PRN Kaya Li MD    HYDROcodone-acetaminophen 2 tablet Oral Q6H PRN Kaya Li MD    lactated ringers 100 mL/hr Intravenous Continuous Kaya Li MD Last Rate: 100 mL/hr (09/19/19 1048)   metroNIDAZOLE 500 mg Intravenous Q8H Kaya Li MD Last Rate: 500 mg (09/19/19 1701)   morphine injection 2 mg Intravenous Q4H PRN Kaya Li MD    ondansetron 4 mg Intravenous Q6H PRN MD Negro Pacheco [facility]] predniSONE 50 mg Oral Daily Sumeet Jay MD         Today, Patient Was Seen By: Sumeet Jay MD    ** Please Note: Dictation voice to text software may have been used in the creation of this document   **

## 2019-09-20 PROBLEM — R55 SYNCOPE, VASOVAGAL: Status: ACTIVE | Noted: 2019-09-20

## 2019-09-20 LAB
ALBUMIN SERPL BCP-MCNC: 2.8 G/DL (ref 3.5–5)
ALP SERPL-CCNC: 61 U/L (ref 46–116)
ALT SERPL W P-5'-P-CCNC: 135 U/L (ref 12–78)
ANION GAP SERPL CALCULATED.3IONS-SCNC: 8 MMOL/L (ref 4–13)
AST SERPL W P-5'-P-CCNC: 69 U/L (ref 5–45)
BILIRUB DIRECT SERPL-MCNC: 0.4 MG/DL (ref 0–0.2)
BILIRUB SERPL-MCNC: 1.54 MG/DL (ref 0.2–1)
BUN SERPL-MCNC: 13 MG/DL (ref 5–25)
CALCIUM SERPL-MCNC: 8.9 MG/DL (ref 8.3–10.1)
CHLORIDE SERPL-SCNC: 101 MMOL/L (ref 100–108)
CO2 SERPL-SCNC: 26 MMOL/L (ref 21–32)
CREAT SERPL-MCNC: 0.77 MG/DL (ref 0.6–1.3)
ERYTHROCYTE [DISTWIDTH] IN BLOOD BY AUTOMATED COUNT: 17.1 % (ref 11.6–15.1)
GFR SERPL CREATININE-BSD FRML MDRD: 107 ML/MIN/1.73SQ M
GLUCOSE SERPL-MCNC: 126 MG/DL (ref 65–140)
GLUCOSE SERPL-MCNC: 131 MG/DL (ref 65–140)
HCT VFR BLD AUTO: 32.3 % (ref 34.8–46.1)
HGB BLD-MCNC: 11 G/DL (ref 11.5–15.4)
MCH RBC QN AUTO: 29.3 PG (ref 26.8–34.3)
MCHC RBC AUTO-ENTMCNC: 34.1 G/DL (ref 31.4–37.4)
MCV RBC AUTO: 86 FL (ref 82–98)
NRBC BLD AUTO-RTO: 0 /100 WBCS
PLATELET # BLD AUTO: 330 THOUSANDS/UL (ref 149–390)
PLATELET BLD QL SMEAR: ADEQUATE
PMV BLD AUTO: 11.9 FL (ref 8.9–12.7)
POTASSIUM SERPL-SCNC: 4.2 MMOL/L (ref 3.5–5.3)
PROT SERPL-MCNC: 8.4 G/DL (ref 6.4–8.2)
RBC # BLD AUTO: 3.75 MILLION/UL (ref 3.81–5.12)
SODIUM SERPL-SCNC: 135 MMOL/L (ref 136–145)
WBC # BLD AUTO: 18.94 THOUSAND/UL (ref 4.31–10.16)

## 2019-09-20 PROCEDURE — 80076 HEPATIC FUNCTION PANEL: CPT | Performed by: SURGERY

## 2019-09-20 PROCEDURE — 85007 BL SMEAR W/DIFF WBC COUNT: CPT | Performed by: SURGERY

## 2019-09-20 PROCEDURE — 80048 BASIC METABOLIC PNL TOTAL CA: CPT | Performed by: SURGERY

## 2019-09-20 PROCEDURE — 99232 SBSQ HOSP IP/OBS MODERATE 35: CPT | Performed by: INTERNAL MEDICINE

## 2019-09-20 PROCEDURE — 85027 COMPLETE CBC AUTOMATED: CPT | Performed by: SURGERY

## 2019-09-20 PROCEDURE — 82948 REAGENT STRIP/BLOOD GLUCOSE: CPT

## 2019-09-20 PROCEDURE — 99024 POSTOP FOLLOW-UP VISIT: CPT | Performed by: PHYSICIAN ASSISTANT

## 2019-09-20 RX ORDER — METRONIDAZOLE 500 MG/1
500 TABLET ORAL EVERY 8 HOURS SCHEDULED
Status: DISCONTINUED | OUTPATIENT
Start: 2019-09-20 | End: 2019-09-20

## 2019-09-20 RX ADMIN — ACETAMINOPHEN 650 MG: 325 TABLET ORAL at 07:37

## 2019-09-20 RX ADMIN — CEFAZOLIN SODIUM 2000 MG: 2 SOLUTION INTRAVENOUS at 06:21

## 2019-09-20 RX ADMIN — METRONIDAZOLE 500 MG: 500 TABLET ORAL at 13:55

## 2019-09-20 RX ADMIN — METRONIDAZOLE 500 MG: 500 INJECTION, SOLUTION INTRAVENOUS at 08:36

## 2019-09-20 RX ADMIN — ACETAMINOPHEN 650 MG: 325 TABLET ORAL at 15:23

## 2019-09-20 RX ADMIN — PREDNISONE 50 MG: 10 TABLET ORAL at 11:15

## 2019-09-20 NOTE — NURSING NOTE
Patient states she was sitting on the toilet when she became dizzy  She pulled the red string and immediately following fell forward off of the toilet striking her face on the wall  Patient sustained a laceration to her inner bottom lip on the right side  Patient is independent at home  Fall preventions in place at the time of the fall were: assisted to the restroom by the PCA, told to pull the red string for assistance back to bed, non slip socks were in place  Fall preventions put in place post fall is bed alarm and reminding patient to again call for assistance for ambulation  Dr Vu  and house supervisor notified of fall  Ice was applied to lip for swelling and comfort  Tylenol was administered for pain control  Will continue to monitor patient

## 2019-09-20 NOTE — PLAN OF CARE
Problem: PAIN - ADULT  Goal: Verbalizes/displays adequate comfort level or baseline comfort level  Description  Interventions:  - Encourage patient to monitor pain and request assistance  - Assess pain using appropriate pain scale  - Administer analgesics based on type and severity of pain and evaluate response  - Implement non-pharmacological measures as appropriate and evaluate response  - Consider cultural and social influences on pain and pain management  - Notify physician/advanced practitioner if interventions unsuccessful or patient reports new pain  Outcome: Progressing     Problem: INFECTION - ADULT  Goal: Absence or prevention of progression during hospitalization  Description  INTERVENTIONS:  - Assess and monitor for signs and symptoms of infection  - Monitor lab/diagnostic results  - Monitor all insertion sites, i e  indwelling lines, tubes, and drains  - Monitor endotracheal if appropriate and nasal secretions for changes in amount and color  - [place] appropriate cooling/warming therapies per order  - Administer medications as ordered  - Instruct and encourage patient and family to use good hand hygiene technique  - Identify and instruct in appropriate isolation precautions for identified infection/condition  Outcome: Progressing  Goal: Absence of fever/infection during neutropenic period  Description  INTERVENTIONS:  - Monitor WBC    Outcome: Progressing     Problem: SAFETY ADULT  Goal: Patient will remain free of falls  Description  INTERVENTIONS:  - Assess patient frequently for physical needs  -  Identify cognitive and physical deficits and behaviors that affect risk of falls    -  [place] fall precautions as indicated by assessment   - Educate patient/family on patient safety including physical limitations  - Instruct patient to call for assistance with activity based on assessment  - Modify environment to reduce risk of injury  - Consider OT/PT consult to assist with strengthening/mobility  Outcome: Progressing  Goal: Maintain or return to baseline ADL function  Description  INTERVENTIONS:  -  Assess patient's ability to carry out ADLs; assess patient's baseline for ADL function and identify physical deficits which impact ability to perform ADLs (bathing, care of mouth/teeth, toileting, grooming, dressing, etc )  - Assess/evaluate cause of self-care deficits   - Assess range of motion  - Assess patient's mobility; develop plan if impaired  - Assess patient's need for assistive devices and provide as appropriate  - Encourage maximum independence but intervene and supervise when necessary  - Involve family in performance of ADLs  - Assess for home care needs following discharge   - Consider OT consult to assist with ADL evaluation and planning for discharge  - Provide patient education as appropriate  Outcome: Progressing  Goal: Maintain or return mobility status to optimal level  Description  INTERVENTIONS:  - Assess patient's baseline mobility status (ambulation, transfers, stairs, etc )    - Identify cognitive and physical deficits and behaviors that affect mobility  - Identify mobility aids required to assist with transfers and/or ambulation (gait belt, sit-to-stand, lift, walker, cane, etc )  - [place] fall precautions as indicated by assessment  - Record patient progress and toleration of activity level on Mobility SBAR; progress patient to next Phase/Stage  - Instruct patient to call for assistance with activity based on assessment  - Consider rehabilitation consult to assist with strengthening/weightbearing, etc   Outcome: Progressing     Problem: DISCHARGE PLANNING  Goal: Discharge to home or other facility with appropriate resources  Description  INTERVENTIONS:  - Identify barriers to discharge w/patient and caregiver  - Arrange for needed discharge resources and transportation as appropriate  - Identify discharge learning needs (meds, wound care, etc )  - Arrange for interpretive services to assist at discharge as needed  - Refer to Case Management Department for coordinating discharge planning if the patient needs post-hospital services based on physician/advanced practitioner order or complex needs related to functional status, cognitive ability, or social support system  Outcome: Progressing     Problem: Knowledge Deficit  Goal: Patient/family/caregiver demonstrates understanding of disease process, treatment plan, medications, and discharge instructions  Description  Complete learning assessment and assess knowledge base  Interventions:  - Provide teaching at level of understanding  - Provide teaching via preferred learning methods  Outcome: Progressing     Problem: Nutrition/Hydration-ADULT  Goal: Nutrient/Hydration intake appropriate for improving, restoring or maintaining nutritional needs  Description  Monitor and assess patient's nutrition/hydration status for malnutrition  Collaborate with interdisciplinary team and initiate plan and interventions as ordered  Monitor patient's weight and dietary intake as ordered or per policy  Utilize nutrition screening tool and intervene as necessary  Determine patient's food preferences and provide high-protein, high-caloric foods as appropriate       INTERVENTIONS:  - Monitor oral intake, urinary output, labs, and treatment plans  - Assess nutrition and hydration status and recommend course of action  - Evaluate amount of meals eaten  - Assist patient with eating if necessary   - Allow adequate time for meals  - Recommend/ encourage appropriate diets, oral nutritional supplements, and vitamin/mineral supplements  - Order, calculate, and assess calorie counts as needed  - Recommend, monitor, and adjust tube feedings and TPN/PPN based on assessed needs  - Assess need for intravenous fluids  - Provide specific nutrition/hydration education as appropriate  - Include patient/family/caregiver in decisions related to nutrition  Outcome: Progressing     Problem: GASTROINTESTINAL - ADULT  Goal: Minimal or absence of nausea and/or vomiting  Description  INTERVENTIONS:  - Administer IV fluids if ordered to ensure adequate hydration  - Maintain NPO status until nausea and vomiting are resolved  - Nasogastric tube if ordered  - Administer ordered antiemetic medications as needed  - Provide nonpharmacologic comfort measures as appropriate  - Advance diet as tolerated, if ordered  - Consider nutrition services referral to assist patient with adequate nutrition and appropriate food choices  Outcome: Progressing  Goal: Maintains adequate nutritional intake  Description  INTERVENTIONS:  - Monitor percentage of each meal consumed  - Identify factors contributing to decreased intake, treat as appropriate  - Assist with meals as needed  - Monitor I&O, weight, and lab values if indicated  - Obtain nutrition services referral as needed  Outcome: Progressing     Problem: METABOLIC, FLUID AND ELECTROLYTES - ADULT  Goal: Electrolytes maintained within normal limits  Description  INTERVENTIONS:  - Monitor labs and assess patient for signs and symptoms of electrolyte imbalances  - Administer electrolyte replacement as ordered  - Monitor response to electrolyte replacements, including repeat lab results as appropriate  - Instruct patient on fluid and nutrition as appropriate  Outcome: Progressing  Goal: Fluid balance maintained  Description  INTERVENTIONS:  - Monitor labs   - Monitor I/O and WT  - Instruct patient on fluid and nutrition as appropriate  - Assess for signs & symptoms of volume excess or deficit  Outcome: Progressing     Problem: Potential for Falls  Goal: Patient will remain free of falls  Description  INTERVENTIONS:  - Assess patient frequently for physical needs  -  Identify cognitive and physical deficits and behaviors that affect risk of falls    -  [place] fall precautions as indicated by assessment   - Educate patient/family on patient safety including physical limitations  - Instruct patient to call for assistance with activity based on assessment  - Modify environment to reduce risk of injury  - Consider OT/PT consult to assist with strengthening/mobility  Outcome: Progressing

## 2019-09-20 NOTE — PROGRESS NOTES
Progress Note - General Surgery   Miranda Anand 30 y o  female MRN: [id number]  Unit/Bed#: [facility] 2 -01 Encounter: [id number]    Assessment/Plan    POD # 1 s/p laparoscopic cholecystectomy    Afebrile, VSS  WBC 18 94 (12 36)  AST/ALT 69/135 (22/37 from 9/18)  Alk phos WNL  Total bilirubin 1 54 (1 21)  Direct bilirubin 0 40    Patient feels well this morning, only c/o post-op abd tenderness without nausea or vomiting, tolerating regular diet  She described an episode of lightheadedness when she got up to use the toilet earlier this morning which led her to fall face forward into the bathroom wall, and she sustained a lip laceration  She did not hit her head, she denies any current lightheadedness, dizziness, visual changes, etc   Her incision sites are c/d/i, NABS x4, soft, expected post-op tenderness only, no peritoneal signs    Laboratory abnormalities discussed with Dr Humaira Ann, these minimal changes are within normal limits in the post-operative setting  Patient okay for discharge from surgical standpoint, per primary  She will follow up with Dr Humaira Ann in his office  /75   Pulse 78   Temp 98 5 °F (36 9 °C)   Resp 18   Wt 121 kg (267 lb 3 2 oz)   LMP 08/17/2019   SpO2 99%   BMI 44 12 kg/m²     Labs in chart were reviewed    Results from last 7 days   Lab Units 09/20/19  0501   WBC Thousand/uL 18 94*   HEMOGLOBIN g/dL 11 0*   HEMATOCRIT % 32 3*   PLATELETS Thousands/uL 330     Results from last 7 days   Lab Units 09/20/19  0501   POTASSIUM mmol/L 4 2   CHLORIDE mmol/L 101   CO2 mmol/L 26   BUN mg/dL 13   CREATININE mg/dL 0 77     Results from last 7 days   Lab Units 09/20/19  0501   AST U/L 69*   ALT U/L 135*   ALK PHOS U/L 61   TOTAL BILIRUBIN mg/dL 1 54*   ALBUMIN g/dL 2 8*           Intake/Output Summary (Last 24 hours) at 9/20/2019 0912  Last data filed at 9/19/2019 1427  Gross per 24 hour   Intake 2180 ml   Output    Net 2180 ml           Subjective/Objective     Subjective: Patient c/o sore lip 2/2 minor non-traumatic fall this morning, otherwise only mild abd pain and denies any n/v, tolerating regular diet    Review of Systems - History obtained from the patient  General ROS: negative for - chills or fever  Respiratory ROS: no cough, shortness of breath, or wheezing  Cardiovascular ROS: no chest pain or dyspnea on exertion  Gastrointestinal ROS: see above  Genito-Urinary ROS: no dysuria, trouble voiding, or hematuria  Neurological ROS: no dizziness, lightheadedness or visual changes currently       Objective:     Physical Exam:  /75   Pulse 78   Temp 98 5 °F (36 9 °C)   Resp 18   Wt 121 kg (267 lb 3 2 oz)   LMP 08/17/2019   SpO2 99%   BMI 44 12 kg/m²   General appearance: alert and oriented, in no acute distress  Head: Normocephalic, without obvious abnormality, atraumatic  Eyes: sclera anicteric  Throat: lip with minor contusion/lacteration, no swelling or active bleeding, no loose dentition, otherwise normal  Neck: supple, symmetrical, trachea midline  Lungs: clear to auscultation bilaterally  Heart: regular rate and rhythm, S1, S2 normal, no murmur, click, rub or gallop  Abdomen: incisions c/d/i, NABS x4, belly is soft, expected post-op tenderness, no distention or peritoneal signs    Extremities: extremities normal, warm and well-perfused; no cyanosis, clubbing, or edema  Neurologic: Grossly normal      Josué Carroll PA-C  9/20/2019

## 2019-09-20 NOTE — NURSING NOTE
Pt resting in bed, reports pain has improved after prn medication, call bell and personal items with in reach  Agree with previous assessment   Will continue to monitor

## 2019-09-20 NOTE — PROGRESS NOTES
The metronidazole has / have been converted to Oral per [facility] IV-to-PO Auto-Conversion Protocol for Adults as approved by the Pharmacy and Therapeutics Committee  The patient met all eligible criteria:  3 Age = 23years old   2) Received at least one dose of the IV form   3) Receiving at least one other scheduled oral/enteral medication   4) Tolerating an oral/enteral diet   and did not have any exclusions:   1) Critical care patient   2) Active GI bleed (IF assessing H2RAs or PPIs)   3) Continuous tube feeding (IF assessing cipro, doxycycline, levofloxacin, minocycline, rifampin, or voriconazole)   4) Receiving PO vancomycin (IF assessing metronidazole)   5) Persistent nausea and/or vomiting   6) Ileus or gastrointestinal obstruction   7) Abelardo/nasogastric tube set for continuous suction   8) Specific order not to automatically convert to PO (in the order's comments or if discussed in the most recent Infectious Disease or primary team's progress notes)

## 2019-09-20 NOTE — ASSESSMENT & PLAN NOTE
Platelet count still normal and now at 330  Status post IVIG infusion on 09/17/2019  Continue prednisone 50 mg daily for 1 week  Decreased by 10 mg per week until discontinued  Outpatient Hematology follow-up

## 2019-09-20 NOTE — ASSESSMENT & PLAN NOTE
· The patient syncopized twice today  On her 1st episode she sustained a lower lip laceration    · Suspect this is due to her recent surgery, postoperative state, vasovagal  · It would be prudent to keep her overnight and picture she is stable before possible discharge tomorrow  · Diet as tolerated

## 2019-09-20 NOTE — PROGRESS NOTES
Progress Note Renato Parnell 1995, 30 y o  female MRN: [id number]    Unit/Bed#: [facility] 2 -01 Encounter: [id number]    Primary Care Provider: Jaspreet Hernandez DO   Date and time admitted to hospital: 9/17/2019 11:47 PM        * Acute cholecystitis  Assessment & Plan  · POD # 1 Status post cholecystectomy  · Pain control  · Diet as tolerated  · DC perioperative antibiotic  · Surgical follow-up in the office    Acute ITP [facility])  Assessment & Plan  Platelet count still normal and now at 330  Status post IVIG infusion on 09/17/2019  Continue prednisone 50 mg daily for 1 week  Decreased by 10 mg per week until discontinued  Outpatient Hematology follow-up    Syncope, vasovagal  Assessment & Plan  · The patient syncopized twice today  On her 1st episode she sustained a lower lip laceration  · Suspect this is due to her recent surgery, postoperative state, vasovagal  · It would be prudent to keep her overnight and picture she is stable before possible discharge tomorrow  · Diet as tolerated    Morbid obesity ([address] )  Assessment & Plan  Will benefit from weight loss diet and exercise    Other specified anxiety disorders  Assessment & Plan  Continue prn xanax      VTE Pharmacologic Prophylaxis:   Pharmacologic: Ambulate as tolerated  Mechanical VTE Prophylaxis in Place: No    Patient Centered Rounds: I have performed bedside rounds with nursing staff today  Discussions with Specialists or Other Care Team Provider:  Surgery    Education and Discussions with Family / Patient:  Left voicemail for father    Time Spent for Care: 20 minutes  More than 50% of total time spent on counseling and coordination of care as described above      Current Length of Stay: 2 day(s)    Current Patient Status: Inpatient   Certification Statement: The patient will continue to require additional inpatient hospital stay due to syncope, post op    Discharge Plan: in 2 4 hours    Code Status: Level 1 - Full Code      Subjective:   + 2 syncopal episodes  Sustained a lower lip laceration on her 1st episode  First episode happened while in the bathroom while trying to have a bowel movement  2nd episode happened while beside her bed when she stood up and fortunately she was safely guided back to bed     Objective:     Vitals:   Temp (24hrs), Av 6 °F (37 °C), Min:98 3 °F (36 8 °C), Max:99 °F (37 2 °C)    Temp:  [98 3 °F (36 8 °C)-99 °F (37 2 °C)] 98 3 °F (36 8 °C)  HR:  [] 52  Resp:  [18] 18  BP: ()/(49-78) 110/54  SpO2:  [96 %-99 %] 96 %  Body mass index is 44 12 kg/m²  Input and Output Summary (last 24 hours):     No intake or output data in the 24 hours ending 19 1447    Physical Exam:     Physical Exam   Constitutional: She is oriented to person, place, and time  Obese   HENT:   Head: Normocephalic and atraumatic  Eyes: No scleral icterus  Neck: No JVD present  Cardiovascular: Regular rhythm  Exam reveals no friction rub  No murmur heard  Pulmonary/Chest: Effort normal  No stridor  No respiratory distress  She has no wheezes  Abdominal: There is tenderness  Incisions intact   Musculoskeletal: She exhibits no edema  Neurological: She is alert and oriented to person, place, and time  Skin: Skin is warm and dry  Psychiatric: She has a normal mood and affect   Her behavior is normal          Additional Data:     Labs:    Results from last 7 days   Lab Units 19  0501 19  0639 19  0037   WBC Thousand/uL 18 94* 12 36* 18 27*   HEMOGLOBIN g/dL 11 0* 10 4* 11 4*   HEMATOCRIT % 32 3* 30 2* 33 5*   PLATELETS Thousands/uL 330 275 280   BANDS PCT %  --   --  5   NEUTROS PCT %  --  65  --    LYMPHS PCT %  --  23  --    LYMPHO PCT %  --   --  9*   MONOS PCT %  --  9  --    MONO PCT %  --   --  4   EOS PCT %  --  0 0     Results from last 7 days   Lab Units 19  0501   SODIUM mmol/L 135*   POTASSIUM mmol/L 4 2   CHLORIDE mmol/L 101   CO2 mmol/L 26   BUN mg/dL 13   CREATININE mg/dL 0 77 ANION GAP mmol/L 8   CALCIUM mg/dL 8 9   ALBUMIN g/dL 2 8*   TOTAL BILIRUBIN mg/dL 1 54*   ALK PHOS U/L 61   ALT U/L 135*   AST U/L 69*   GLUCOSE RANDOM mg/dL 126         Results from last 7 days   Lab Units 09/20/19  0725   POC GLUCOSE mg/dl 131         Results from last 7 days   Lab Units 09/18/19  0037   LACTIC ACID mmol/L 1 0           * I Have Reviewed All Lab Data Listed Above  * Additional Pertinent Lab Tests Reviewed: All Labs Within Last 24 Hours Reviewed    Imaging:    Imaging Reports Reviewed Today Include:  None      Recent Cultures (last 7 days):           Last 24 Hours Medication List:     Current Facility-Administered Medications:  acetaminophen 650 mg Oral Q6H PRN Shelby Mg MD   ALPRAZolam 0 25 mg Oral Daily PRN Shelby Mg MD   HYDROcodone-acetaminophen 1 tablet Oral Q4H PRLEIGH ANN Mg MD   HYDROcodone-acetaminophen 2 tablet Oral Q6H PRN Shelby Mg MD   morphine injection 2 mg Intravenous Q4H PRLEIGH ANN Mg MD   ondansetron 4 mg Intravenous Q6H PRN Shelby Mg MD   predniSONE 50 mg Oral Daily Maia Webber MD        Today, Patient Was Seen By: Maia Webebr MD    ** Please Note: Dictation voice to text software may have been used in the creation of this document   **

## 2019-09-20 NOTE — PLAN OF CARE
Problem: PAIN - ADULT  Goal: Verbalizes/displays adequate comfort level or baseline comfort level  Description  Interventions:  - Encourage patient to monitor pain and request assistance  - Assess pain using appropriate pain scale  - Administer analgesics based on type and severity of pain and evaluate response  - Implement non-pharmacological measures as appropriate and evaluate response  - Consider cultural and social influences on pain and pain management  - Notify physician/advanced practitioner if interventions unsuccessful or patient reports new pain  Outcome: Progressing     Problem: INFECTION - ADULT  Goal: Absence or prevention of progression during hospitalization  Description  INTERVENTIONS:  - Assess and monitor for signs and symptoms of infection  - Monitor lab/diagnostic results  - Monitor all insertion sites, i e  indwelling lines, tubes, and drains  - Monitor endotracheal if appropriate and nasal secretions for changes in amount and color  - [place] appropriate cooling/warming therapies per order  - Administer medications as ordered  - Instruct and encourage patient and family to use good hand hygiene technique  - Identify and instruct in appropriate isolation precautions for identified infection/condition  Outcome: Progressing  Goal: Absence of fever/infection during neutropenic period  Description  INTERVENTIONS:  - Monitor WBC    Outcome: Progressing     Problem: SAFETY ADULT  Goal: Patient will remain free of falls  Description  INTERVENTIONS:  - Assess patient frequently for physical needs  -  Identify cognitive and physical deficits and behaviors that affect risk of falls    -  [place] fall precautions as indicated by assessment   - Educate patient/family on patient safety including physical limitations  - Instruct patient to call for assistance with activity based on assessment  - Modify environment to reduce risk of injury  - Consider OT/PT consult to assist with strengthening/mobility  Outcome: Progressing  Goal: Maintain or return to baseline ADL function  Description  INTERVENTIONS:  -  Assess patient's ability to carry out ADLs; assess patient's baseline for ADL function and identify physical deficits which impact ability to perform ADLs (bathing, care of mouth/teeth, toileting, grooming, dressing, etc )  - Assess/evaluate cause of self-care deficits   - Assess range of motion  - Assess patient's mobility; develop plan if impaired  - Assess patient's need for assistive devices and provide as appropriate  - Encourage maximum independence but intervene and supervise when necessary  - Involve family in performance of ADLs  - Assess for home care needs following discharge   - Consider OT consult to assist with ADL evaluation and planning for discharge  - Provide patient education as appropriate  Outcome: Progressing  Goal: Maintain or return mobility status to optimal level  Description  INTERVENTIONS:  - Assess patient's baseline mobility status (ambulation, transfers, stairs, etc )    - Identify cognitive and physical deficits and behaviors that affect mobility  - Identify mobility aids required to assist with transfers and/or ambulation (gait belt, sit-to-stand, lift, walker, cane, etc )  - [place] fall precautions as indicated by assessment  - Record patient progress and toleration of activity level on Mobility SBAR; progress patient to next Phase/Stage  - Instruct patient to call for assistance with activity based on assessment  - Consider rehabilitation consult to assist with strengthening/weightbearing, etc   Outcome: Progressing     Problem: DISCHARGE PLANNING  Goal: Discharge to home or other facility with appropriate resources  Description  INTERVENTIONS:  - Identify barriers to discharge w/patient and caregiver  - Arrange for needed discharge resources and transportation as appropriate  - Identify discharge learning needs (meds, wound care, etc )  - Arrange for interpretive services to assist at discharge as needed  - Refer to Case Management Department for coordinating discharge planning if the patient needs post-hospital services based on physician/advanced practitioner order or complex needs related to functional status, cognitive ability, or social support system  Outcome: Progressing     Problem: Knowledge Deficit  Goal: Patient/family/caregiver demonstrates understanding of disease process, treatment plan, medications, and discharge instructions  Description  Complete learning assessment and assess knowledge base  Interventions:  - Provide teaching at level of understanding  - Provide teaching via preferred learning methods  Outcome: Progressing     Problem: Nutrition/Hydration-ADULT  Goal: Nutrient/Hydration intake appropriate for improving, restoring or maintaining nutritional needs  Description  Monitor and assess patient's nutrition/hydration status for malnutrition  Collaborate with interdisciplinary team and initiate plan and interventions as ordered  Monitor patient's weight and dietary intake as ordered or per policy  Utilize nutrition screening tool and intervene as necessary  Determine patient's food preferences and provide high-protein, high-caloric foods as appropriate       INTERVENTIONS:  - Monitor oral intake, urinary output, labs, and treatment plans  - Assess nutrition and hydration status and recommend course of action  - Evaluate amount of meals eaten  - Assist patient with eating if necessary   - Allow adequate time for meals  - Recommend/ encourage appropriate diets, oral nutritional supplements, and vitamin/mineral supplements  - Order, calculate, and assess calorie counts as needed  - Recommend, monitor, and adjust tube feedings and TPN/PPN based on assessed needs  - Assess need for intravenous fluids  - Provide specific nutrition/hydration education as appropriate  - Include patient/family/caregiver in decisions related to nutrition  Outcome: Progressing     Problem: GASTROINTESTINAL - ADULT  Goal: Minimal or absence of nausea and/or vomiting  Description  INTERVENTIONS:  - Administer IV fluids if ordered to ensure adequate hydration  - Maintain NPO status until nausea and vomiting are resolved  - Nasogastric tube if ordered  - Administer ordered antiemetic medications as needed  - Provide nonpharmacologic comfort measures as appropriate  - Advance diet as tolerated, if ordered  - Consider nutrition services referral to assist patient with adequate nutrition and appropriate food choices  Outcome: Progressing  Goal: Maintains adequate nutritional intake  Description  INTERVENTIONS:  - Monitor percentage of each meal consumed  - Identify factors contributing to decreased intake, treat as appropriate  - Assist with meals as needed  - Monitor I&O, weight, and lab values if indicated  - Obtain nutrition services referral as needed  Outcome: Progressing     Problem: METABOLIC, FLUID AND ELECTROLYTES - ADULT  Goal: Electrolytes maintained within normal limits  Description  INTERVENTIONS:  - Monitor labs and assess patient for signs and symptoms of electrolyte imbalances  - Administer electrolyte replacement as ordered  - Monitor response to electrolyte replacements, including repeat lab results as appropriate  - Instruct patient on fluid and nutrition as appropriate  Outcome: Progressing  Goal: Fluid balance maintained  Description  INTERVENTIONS:  - Monitor labs   - Monitor I/O and WT  - Instruct patient on fluid and nutrition as appropriate  - Assess for signs & symptoms of volume excess or deficit  Outcome: Progressing

## 2019-09-20 NOTE — ASSESSMENT & PLAN NOTE
· POD # 1 Status post cholecystectomy  · Pain control  · Diet as tolerated  · DC perioperative antibiotic  · Surgical follow-up in the office

## 2019-09-20 NOTE — PROGRESS NOTES
[facility]  No [facility] of [facility]  Call bell at [facility]  Will continue to monitor

## 2019-09-21 VITALS
WEIGHT: 267.2 LBS | HEART RATE: 97 BPM | SYSTOLIC BLOOD PRESSURE: 130 MMHG | RESPIRATION RATE: 18 BRPM | OXYGEN SATURATION: 98 % | TEMPERATURE: 98.7 F | DIASTOLIC BLOOD PRESSURE: 77 MMHG | BODY MASS INDEX: 44.12 KG/M2

## 2019-09-21 PROBLEM — K81.0 ACUTE CHOLECYSTITIS: Status: RESOLVED | Noted: 2019-09-18 | Resolved: 2019-09-21

## 2019-09-21 PROBLEM — R55 SYNCOPE, VASOVAGAL: Status: RESOLVED | Noted: 2019-09-20 | Resolved: 2019-09-21

## 2019-09-21 LAB
ALBUMIN SERPL BCP-MCNC: 3.2 G/DL (ref 3.5–5)
ALP SERPL-CCNC: 64 U/L (ref 46–116)
ALT SERPL W P-5'-P-CCNC: 146 U/L (ref 12–78)
ANION GAP SERPL CALCULATED.3IONS-SCNC: 7 MMOL/L (ref 4–13)
AST SERPL W P-5'-P-CCNC: 79 U/L (ref 5–45)
BASOPHILS # BLD AUTO: 0.02 THOUSANDS/ΜL (ref 0–0.1)
BASOPHILS NFR BLD AUTO: 0 % (ref 0–1)
BILIRUB SERPL-MCNC: 1.11 MG/DL (ref 0.2–1)
BUN SERPL-MCNC: 18 MG/DL (ref 5–25)
CALCIUM SERPL-MCNC: 9.3 MG/DL (ref 8.3–10.1)
CHLORIDE SERPL-SCNC: 102 MMOL/L (ref 100–108)
CO2 SERPL-SCNC: 27 MMOL/L (ref 21–32)
CREAT SERPL-MCNC: 0.83 MG/DL (ref 0.6–1.3)
EOSINOPHIL # BLD AUTO: 0.01 THOUSAND/ΜL (ref 0–0.61)
EOSINOPHIL NFR BLD AUTO: 0 % (ref 0–6)
ERYTHROCYTE [DISTWIDTH] IN BLOOD BY AUTOMATED COUNT: 17.9 % (ref 11.6–15.1)
GFR SERPL CREATININE-BSD FRML MDRD: 98 ML/MIN/1.73SQ M
GLUCOSE SERPL-MCNC: 94 MG/DL (ref 65–140)
HCT VFR BLD AUTO: 30.4 % (ref 34.8–46.1)
HGB BLD-MCNC: 10.2 G/DL (ref 11.5–15.4)
IMM GRANULOCYTES # BLD AUTO: >0.5 THOUSAND/UL (ref 0–0.2)
IMM GRANULOCYTES NFR BLD AUTO: 3 % (ref 0–2)
LYMPHOCYTES # BLD AUTO: 1.67 THOUSANDS/ΜL (ref 0.6–4.47)
LYMPHOCYTES NFR BLD AUTO: 10 % (ref 14–44)
MCH RBC QN AUTO: 29.2 PG (ref 26.8–34.3)
MCHC RBC AUTO-ENTMCNC: 33.6 G/DL (ref 31.4–37.4)
MCV RBC AUTO: 87 FL (ref 82–98)
MONOCYTES # BLD AUTO: 0.72 THOUSAND/ΜL (ref 0.17–1.22)
MONOCYTES NFR BLD AUTO: 4 % (ref 4–12)
NEUTROPHILS # BLD AUTO: 13.55 THOUSANDS/ΜL (ref 1.85–7.62)
NEUTS SEG NFR BLD AUTO: 83 % (ref 43–75)
NRBC BLD AUTO-RTO: 1 /100 WBCS
PLATELET # BLD AUTO: 256 THOUSANDS/UL (ref 149–390)
PMV BLD AUTO: 11.6 FL (ref 8.9–12.7)
POTASSIUM SERPL-SCNC: 3.5 MMOL/L (ref 3.5–5.3)
PROT SERPL-MCNC: 9.1 G/DL (ref 6.4–8.2)
RBC # BLD AUTO: 3.49 MILLION/UL (ref 3.81–5.12)
SODIUM SERPL-SCNC: 136 MMOL/L (ref 136–145)
WBC # BLD AUTO: 16.48 THOUSAND/UL (ref 4.31–10.16)

## 2019-09-21 PROCEDURE — 99239 HOSP IP/OBS DSCHRG MGMT >30: CPT | Performed by: INTERNAL MEDICINE

## 2019-09-21 PROCEDURE — 85025 COMPLETE CBC W/AUTO DIFF WBC: CPT | Performed by: INTERNAL MEDICINE

## 2019-09-21 PROCEDURE — 80053 COMPREHEN METABOLIC PANEL: CPT | Performed by: INTERNAL MEDICINE

## 2019-09-21 RX ORDER — PREDNISONE 10 MG/1
TABLET ORAL
Qty: 95 TABLET | Refills: 0 | Status: SHIPPED | OUTPATIENT
Start: 2019-09-21 | End: 2019-09-27 | Stop reason: DRUGHIGH

## 2019-09-21 RX ADMIN — PREDNISONE 50 MG: 10 TABLET ORAL at 08:38

## 2019-09-21 RX ADMIN — ACETAMINOPHEN 650 MG: 325 TABLET ORAL at 08:38

## 2019-09-21 NOTE — PLAN OF CARE
Problem: PAIN - ADULT  Goal: Verbalizes/displays adequate comfort level or baseline comfort level  Description  Interventions:  - Encourage patient to monitor pain and request assistance  - Assess pain using appropriate pain scale  - Administer analgesics based on type and severity of pain and evaluate response  - Implement non-pharmacological measures as appropriate and evaluate response  - Consider cultural and social influences on pain and pain management  - Notify physician/advanced practitioner if interventions unsuccessful or patient reports new pain  Outcome: Progressing     Problem: INFECTION - ADULT  Goal: Absence or prevention of progression during hospitalization  Description  INTERVENTIONS:  - Assess and monitor for signs and symptoms of infection  - Monitor lab/diagnostic results  - Monitor all insertion sites, i e  indwelling lines, tubes, and drains  - Monitor endotracheal if appropriate and nasal secretions for changes in amount and color  - [place] appropriate cooling/warming therapies per order  - Administer medications as ordered  - Instruct and encourage patient and family to use good hand hygiene technique  - Identify and instruct in appropriate isolation precautions for identified infection/condition  Outcome: Progressing  Goal: Absence of fever/infection during neutropenic period  Description  INTERVENTIONS:  - Monitor WBC    Outcome: Progressing     Problem: SAFETY ADULT  Goal: Patient will remain free of falls  Description  INTERVENTIONS:  - Assess patient frequently for physical needs  -  Identify cognitive and physical deficits and behaviors that affect risk of falls    -  [place] fall precautions as indicated by assessment   - Educate patient/family on patient safety including physical limitations  - Instruct patient to call for assistance with activity based on assessment  - Modify environment to reduce risk of injury  - Consider OT/PT consult to assist with strengthening/mobility  Outcome: Progressing  Goal: Maintain or return to baseline ADL function  Description  INTERVENTIONS:  -  Assess patient's ability to carry out ADLs; assess patient's baseline for ADL function and identify physical deficits which impact ability to perform ADLs (bathing, care of mouth/teeth, toileting, grooming, dressing, etc )  - Assess/evaluate cause of self-care deficits   - Assess range of motion  - Assess patient's mobility; develop plan if impaired  - Assess patient's need for assistive devices and provide as appropriate  - Encourage maximum independence but intervene and supervise when necessary  - Involve family in performance of ADLs  - Assess for home care needs following discharge   - Consider OT consult to assist with ADL evaluation and planning for discharge  - Provide patient education as appropriate  Outcome: Progressing  Goal: Maintain or return mobility status to optimal level  Description  INTERVENTIONS:  - Assess patient's baseline mobility status (ambulation, transfers, stairs, etc )    - Identify cognitive and physical deficits and behaviors that affect mobility  - Identify mobility aids required to assist with transfers and/or ambulation (gait belt, sit-to-stand, lift, walker, cane, etc )  - [place] fall precautions as indicated by assessment  - Record patient progress and toleration of activity level on Mobility SBAR; progress patient to next Phase/Stage  - Instruct patient to call for assistance with activity based on assessment  - Consider rehabilitation consult to assist with strengthening/weightbearing, etc   Outcome: Progressing     Problem: DISCHARGE PLANNING  Goal: Discharge to home or other facility with appropriate resources  Description  INTERVENTIONS:  - Identify barriers to discharge w/patient and caregiver  - Arrange for needed discharge resources and transportation as appropriate  - Identify discharge learning needs (meds, wound care, etc )  - Arrange for interpretive services to assist at discharge as needed  - Refer to Case Management Department for coordinating discharge planning if the patient needs post-hospital services based on physician/advanced practitioner order or complex needs related to functional status, cognitive ability, or social support system  Outcome: Progressing     Problem: Knowledge Deficit  Goal: Patient/family/caregiver demonstrates understanding of disease process, treatment plan, medications, and discharge instructions  Description  Complete learning assessment and assess knowledge base  Interventions:  - Provide teaching at level of understanding  - Provide teaching via preferred learning methods  Outcome: Progressing     Problem: Nutrition/Hydration-ADULT  Goal: Nutrient/Hydration intake appropriate for improving, restoring or maintaining nutritional needs  Description  Monitor and assess patient's nutrition/hydration status for malnutrition  Collaborate with interdisciplinary team and initiate plan and interventions as ordered  Monitor patient's weight and dietary intake as ordered or per policy  Utilize nutrition screening tool and intervene as necessary  Determine patient's food preferences and provide high-protein, high-caloric foods as appropriate       INTERVENTIONS:  - Monitor oral intake, urinary output, labs, and treatment plans  - Assess nutrition and hydration status and recommend course of action  - Evaluate amount of meals eaten  - Assist patient with eating if necessary   - Allow adequate time for meals  - Recommend/ encourage appropriate diets, oral nutritional supplements, and vitamin/mineral supplements  - Order, calculate, and assess calorie counts as needed  - Recommend, monitor, and adjust tube feedings and TPN/PPN based on assessed needs  - Assess need for intravenous fluids  - Provide specific nutrition/hydration education as appropriate  - Include patient/family/caregiver in decisions related to nutrition  Outcome: Progressing     Problem: GASTROINTESTINAL - ADULT  Goal: Minimal or absence of nausea and/or vomiting  Description  INTERVENTIONS:  - Administer IV fluids if ordered to ensure adequate hydration  - Maintain NPO status until nausea and vomiting are resolved  - Nasogastric tube if ordered  - Administer ordered antiemetic medications as needed  - Provide nonpharmacologic comfort measures as appropriate  - Advance diet as tolerated, if ordered  - Consider nutrition services referral to assist patient with adequate nutrition and appropriate food choices  Outcome: Progressing  Goal: Maintains adequate nutritional intake  Description  INTERVENTIONS:  - Monitor percentage of each meal consumed  - Identify factors contributing to decreased intake, treat as appropriate  - Assist with meals as needed  - Monitor I&O, weight, and lab values if indicated  - Obtain nutrition services referral as needed  Outcome: Progressing     Problem: METABOLIC, FLUID AND ELECTROLYTES - ADULT  Goal: Electrolytes maintained within normal limits  Description  INTERVENTIONS:  - Monitor labs and assess patient for signs and symptoms of electrolyte imbalances  - Administer electrolyte replacement as ordered  - Monitor response to electrolyte replacements, including repeat lab results as appropriate  - Instruct patient on fluid and nutrition as appropriate  Outcome: Progressing  Goal: Fluid balance maintained  Description  INTERVENTIONS:  - Monitor labs   - Monitor I/O and WT  - Instruct patient on fluid and nutrition as appropriate  - Assess for signs & symptoms of volume excess or deficit  Outcome: Progressing     Problem: Potential for Falls  Goal: Patient will remain free of falls  Description  INTERVENTIONS:  - Assess patient frequently for physical needs  -  Identify cognitive and physical deficits and behaviors that affect risk of falls    -  [place] fall precautions as indicated by assessment   - Educate patient/family on patient safety including physical limitations  - Instruct patient to call for assistance with activity based on assessment  - Modify environment to reduce risk of injury  - Consider OT/PT consult to assist with strengthening/mobility  Outcome: Progressing

## 2019-09-21 NOTE — DISCHARGE SUMMARY
Discharge Summary - Lalito Guillory 30 y o  female MRN: [id number]    Unit/Bed#: [place] [address] 221-01 Encounter: [id number]    Admission Date:   Admission Orders (From admission, onward)     Ordered        09/18/19 0226  Inpatient Admission  Once                     Admitting Diagnosis: Acute cholecystitis [K81 0]  Back pain [M54 9]  Leukocytosis [D72 829]  Abdominal pain [R10 9]    HPI: Lalito Guillory is a 30 y o  female who presents with c/o severe abdominal and back pain since this afternoon, progressing in intensity, worse over RUQ, radiating to back, had associated nausea, emesis and diarrhea  Describes diarrhea as runny and brown, denies mucus or blood in stools  States she ate today, tolerated meals  Denies fevers  Procedures Performed:  Laparoscopic cholecystectomy    Summary of Hospital Course:  Patient diagnosed with acute cholecystitis  She underwent laparoscopic cholecystectomy 09/19/2019  CMP showed mildly elevated LFTs, per surgery expected after the procedure  Follow up with surgery as scheduled  Repeat CMP 09/27/2019 and follow up with PCP for results  After the surgery patient had 2 episodes of vasovagal syncope suspected to be due to her recent surgery and postop state  Patient is ambulating today, denies lightheadedness, denies dizziness  Tolerates diet well  Recent history of ITP  She received IVIG on 09/17/2019, she is currently on prednisone  Discussed with Hematology  She will continue 50 mg of prednisone until 09/26/2019 and then decrease by 10 mg every week until discontinued  Follow up with Hematology as scheduled in October  She has labs ordered twice a week by Hematology  Her platelets on the discharge 256      Significant Findings, Care, Treatment and Services Provided:  Laparoscopic cholecystectomy    Complications:  None    Discharge Diagnosis:   Acute cholecystitis status post laparoscopic cholecystectomy 9/19/19  Vasovagal syncope-resolved  Recent history of ITP  Morbid obesity  Anxiety        Resolved Problems  Date Reviewed: 9/20/2019          Resolved    * (Principal) Acute cholecystitis 9/21/2019     Resolved by  Kierra Paz MD    Syncope, vasovagal 9/21/2019     Resolved by  Kierra Paz MD          Condition at Discharge: good         Discharge instructions/Information to patient and family:   See after visit summary for information provided to patient and family  Provisions for Follow-Up Care:  See after visit summary for information related to follow-up care and any pertinent home health orders  PCP: Jamel Sevilla DO    Disposition: Home    Planned Readmission: No      Discharge Statement   I spent 33 minutes discharging the patient  This time was spent on the day of discharge  I had direct contact with the patient on the day of discharge  Additional documentation is required if more than 30 minutes were spent on discharge  Discharge Medications:  See after visit summary for reconciled discharge medications provided to patient and family

## 2019-09-23 ENCOUNTER — TELEPHONE (OUTPATIENT)
Dept: SURGERY | Facility: HOSPITAL | Age: 24
End: 2019-09-23

## 2019-09-23 ENCOUNTER — TRANSITIONAL CARE MANAGEMENT (OUTPATIENT)
Dept: INTERNAL MEDICINE CLINIC | Facility: CLINIC | Age: 24
End: 2019-09-23

## 2019-09-23 ENCOUNTER — APPOINTMENT (OUTPATIENT)
Dept: LAB | Age: 24
End: 2019-09-23
Payer: COMMERCIAL

## 2019-09-23 DIAGNOSIS — K81.0 ACUTE CHOLECYSTITIS: ICD-10-CM

## 2019-09-23 NOTE — TELEPHONE ENCOUNTER
Called patient for a post-op check  She is doing well and has no concerns at this time    A post-op visit was scheduled for 10/2/19 at 8:45am

## 2019-09-24 ENCOUNTER — TELEPHONE (OUTPATIENT)
Dept: HEMATOLOGY ONCOLOGY | Facility: CLINIC | Age: 24
End: 2019-09-24

## 2019-09-24 DIAGNOSIS — D69.3 ACUTE ITP (HCC): Primary | ICD-10-CM

## 2019-09-24 NOTE — TELEPHONE ENCOUNTER
Patient called in had a couple of questions for Dr Pauline Aranda per yesterday's phone call  A good call back number is [phone]

## 2019-09-24 NOTE — TELEPHONE ENCOUNTER
I spoke with patient yesterday  She had cholecystectomy recently  She mentioned that she had 2 doses of IVIG and 2nd dose was the day of admission for gallbladder problem  She is taking prednisone 50 mg daily  She will go for blood test on 9/26/19 and to decide after that

## 2019-09-25 PROBLEM — K81.9 CHOLECYSTITIS: Status: ACTIVE | Noted: 2019-09-18

## 2019-09-25 NOTE — TELEPHONE ENCOUNTER
Spoke to patient and she wanted to know if she can drop off FMLA paperwork to our office to be completed  She stated that she wants to know a few things:     1  Since she menstruating should she be doing something to protect her hemoglobin from dropping? - Yes, take OTC iron, per Dr Suzanna Dejesus  2 She has a small rash and wanted to know who the best person would be to talk to about treating it?    - Call PCP or if it gets to bad go to ED  3  She fell while in the ED last week and wanted to make sure that Dr Suzanna Dejesus is aware    - Dr Suzanna Dejesus was informed of this information     Patient came into the [facility] office on 9/25/19 and provided our office with the paperwork that needs to be completed  I informed patient that it can take 24-48 to be completed and sent to her employer  I informed her that once the paperwork if completed a copy will be mailed out to the address that we have on file  She voiced understanding and was instructed to call us if she has any additional questions

## 2019-09-26 ENCOUNTER — TRANSCRIBE ORDERS (OUTPATIENT)
Dept: ADMINISTRATIVE | Age: 24
End: 2019-09-26

## 2019-09-26 ENCOUNTER — HOSPITAL ENCOUNTER (OUTPATIENT)
Dept: INFUSION CENTER | Facility: CLINIC | Age: 24
Discharge: HOME | End: 2019-09-26
Payer: COMMERCIAL

## 2019-09-26 ENCOUNTER — APPOINTMENT (OUTPATIENT)
Dept: LAB | Age: 24
End: 2019-09-26
Payer: COMMERCIAL

## 2019-09-26 ENCOUNTER — TELEPHONE (OUTPATIENT)
Dept: HEMATOLOGY ONCOLOGY | Facility: CLINIC | Age: 24
End: 2019-09-26

## 2019-09-26 VITALS
DIASTOLIC BLOOD PRESSURE: 85 MMHG | TEMPERATURE: 97.6 F | HEART RATE: 118 BPM | RESPIRATION RATE: 18 BRPM | SYSTOLIC BLOOD PRESSURE: 136 MMHG

## 2019-09-26 DIAGNOSIS — R23.3 SPONTANEOUS ECCHYMOSES: Primary | ICD-10-CM

## 2019-09-26 DIAGNOSIS — D69.3 ACUTE ITP (HCC): Primary | ICD-10-CM

## 2019-09-26 DIAGNOSIS — D69.3 ACUTE ITP (HCC): ICD-10-CM

## 2019-09-26 LAB
ALBUMIN SERPL BCP-MCNC: 3.7 G/DL (ref 3.5–5)
ALP SERPL-CCNC: 62 U/L (ref 46–116)
ALT SERPL W P-5'-P-CCNC: 139 U/L (ref 12–78)
ANION GAP SERPL CALCULATED.3IONS-SCNC: 8 MMOL/L (ref 4–13)
AST SERPL W P-5'-P-CCNC: 53 U/L (ref 5–45)
BASOPHILS # BLD AUTO: 0.02 THOUSANDS/ΜL (ref 0–0.1)
BASOPHILS NFR BLD AUTO: 0 % (ref 0–1)
BILIRUB SERPL-MCNC: 1.45 MG/DL (ref 0.2–1)
BUN SERPL-MCNC: 19 MG/DL (ref 5–25)
CALCIUM SERPL-MCNC: 9.1 MG/DL (ref 8.3–10.1)
CHLORIDE SERPL-SCNC: 105 MMOL/L (ref 100–108)
CO2 SERPL-SCNC: 27 MMOL/L (ref 21–32)
CREAT SERPL-MCNC: 0.81 MG/DL (ref 0.6–1.3)
EOSINOPHIL # BLD AUTO: 0.03 THOUSAND/ΜL (ref 0–0.61)
EOSINOPHIL NFR BLD AUTO: 0 % (ref 0–6)
ERYTHROCYTE [DISTWIDTH] IN BLOOD BY AUTOMATED COUNT: 21 % (ref 11.6–15.1)
GFR SERPL CREATININE-BSD FRML MDRD: 101 ML/MIN/1.73SQ M
GLUCOSE P FAST SERPL-MCNC: 76 MG/DL (ref 65–99)
HCT VFR BLD AUTO: 32.4 % (ref 34.8–46.1)
HGB BLD-MCNC: 10.7 G/DL (ref 11.5–15.4)
IMM GRANULOCYTES # BLD AUTO: 0.18 THOUSAND/UL (ref 0–0.2)
IMM GRANULOCYTES NFR BLD AUTO: 2 % (ref 0–2)
LYMPHOCYTES # BLD AUTO: 2.8 THOUSANDS/ΜL (ref 0.6–4.47)
LYMPHOCYTES NFR BLD AUTO: 34 % (ref 14–44)
MCH RBC QN AUTO: 29.7 PG (ref 26.8–34.3)
MCHC RBC AUTO-ENTMCNC: 33 G/DL (ref 31.4–37.4)
MCV RBC AUTO: 90 FL (ref 82–98)
MONOCYTES # BLD AUTO: 0.55 THOUSAND/ΜL (ref 0.17–1.22)
MONOCYTES NFR BLD AUTO: 7 % (ref 4–12)
NEUTROPHILS # BLD AUTO: 4.72 THOUSANDS/ΜL (ref 1.85–7.62)
NEUTS SEG NFR BLD AUTO: 57 % (ref 43–75)
NRBC BLD AUTO-RTO: 1 /100 WBCS
PLATELET # BLD AUTO: 9 THOUSANDS/UL (ref 149–390)
POTASSIUM SERPL-SCNC: 3.6 MMOL/L (ref 3.5–5.3)
PROT SERPL-MCNC: 8.1 G/DL (ref 6.4–8.2)
RBC # BLD AUTO: 3.6 MILLION/UL (ref 3.81–5.12)
SODIUM SERPL-SCNC: 140 MMOL/L (ref 136–145)
WBC # BLD AUTO: 8.3 THOUSAND/UL (ref 4.31–10.16)

## 2019-09-26 PROCEDURE — 96365 THER/PROPH/DIAG IV INF INIT: CPT

## 2019-09-26 PROCEDURE — 85025 COMPLETE CBC W/AUTO DIFF WBC: CPT

## 2019-09-26 PROCEDURE — 36415 COLL VENOUS BLD VENIPUNCTURE: CPT

## 2019-09-26 PROCEDURE — 80053 COMPREHEN METABOLIC PANEL: CPT

## 2019-09-26 RX ORDER — SODIUM CHLORIDE 9 MG/ML
20 INJECTION, SOLUTION INTRAVENOUS ONCE
Status: CANCELLED | OUTPATIENT
Start: 2019-09-27

## 2019-09-26 RX ORDER — SODIUM CHLORIDE 9 MG/ML
20 INJECTION, SOLUTION INTRAVENOUS ONCE
Status: COMPLETED | OUTPATIENT
Start: 2019-09-26 | End: 2019-09-26

## 2019-09-26 RX ORDER — SODIUM CHLORIDE 9 MG/ML
20 INJECTION, SOLUTION INTRAVENOUS ONCE
Status: CANCELLED | OUTPATIENT
Start: 2019-09-26

## 2019-09-26 RX ADMIN — SODIUM CHLORIDE 20 ML/HR: 0.9 INJECTION, SOLUTION INTRAVENOUS at 14:55

## 2019-09-26 RX ADMIN — SODIUM CHLORIDE 1000 MG: 0.9 INJECTION, SOLUTION INTRAVENOUS at 14:57

## 2019-09-26 NOTE — TELEPHONE ENCOUNTER
Pt's platelet result D/W Dr Kurt Andres  Ordered Solumedrol 1 GM daily x 2 starting today  Called [place] infusion and scheduled  Called pt who will go now for dose  Informed Dr Kurt Andres will make a more long range plan and update her

## 2019-09-26 NOTE — PLAN OF CARE
Problem: Potential for Falls  Goal: Patient will remain free of falls  Description  INTERVENTIONS:  - Assess patient frequently for physical needs  -  Identify cognitive and physical deficits and behaviors that affect risk of falls    -  [place] fall precautions as indicated by assessment   - Educate patient/family on patient safety including physical limitations  - Instruct patient to call for assistance with activity based on assessment  - Modify environment to reduce risk of injury  - Consider OT/PT consult to assist with strengthening/mobility  Outcome: Progressing

## 2019-09-26 NOTE — TELEPHONE ENCOUNTER
Called [place] infusion and spoke with [facility] RN that Dr Mckeon Never wants stat CBCD with tomorrow's dose of Solumedrol and to call results

## 2019-09-27 ENCOUNTER — HOSPITAL ENCOUNTER (OUTPATIENT)
Dept: INFUSION CENTER | Facility: CLINIC | Age: 24
Discharge: HOME | End: 2019-09-27
Payer: COMMERCIAL

## 2019-09-27 ENCOUNTER — TELEPHONE (OUTPATIENT)
Dept: HEMATOLOGY ONCOLOGY | Facility: CLINIC | Age: 24
End: 2019-09-27

## 2019-09-27 VITALS
DIASTOLIC BLOOD PRESSURE: 85 MMHG | SYSTOLIC BLOOD PRESSURE: 132 MMHG | TEMPERATURE: 99.4 F | RESPIRATION RATE: 18 BRPM | HEART RATE: 86 BPM

## 2019-09-27 DIAGNOSIS — D69.3 ACUTE ITP (HCC): Primary | ICD-10-CM

## 2019-09-27 LAB
ABO GROUP BLD: NORMAL
ANISOCYTOSIS BLD QL SMEAR: PRESENT
BASOPHILS # BLD MANUAL: 0 THOUSAND/UL (ref 0–0.1)
BASOPHILS NFR MAR MANUAL: 0 % (ref 0–1)
BLD GP AB SCN SERPL QL: NEGATIVE
EOSINOPHIL # BLD MANUAL: 0.14 THOUSAND/UL (ref 0–0.4)
EOSINOPHIL NFR BLD MANUAL: 1 % (ref 0–6)
ERYTHROCYTE [DISTWIDTH] IN BLOOD BY AUTOMATED COUNT: 21.2 % (ref 11.6–15.1)
HCT VFR BLD AUTO: 32.2 % (ref 34.8–46.1)
HGB BLD-MCNC: 10.9 G/DL (ref 11.5–15.4)
LG PLATELETS BLD QL SMEAR: PRESENT
LYMPHOCYTES # BLD AUTO: 0.71 THOUSAND/UL (ref 0.6–4.47)
LYMPHOCYTES # BLD AUTO: 5 % (ref 14–44)
MACROCYTES BLD QL AUTO: PRESENT
MCH RBC QN AUTO: 30.2 PG (ref 26.8–34.3)
MCHC RBC AUTO-ENTMCNC: 33.9 G/DL (ref 31.4–37.4)
MCV RBC AUTO: 89 FL (ref 82–98)
MONOCYTES # BLD AUTO: 0.43 THOUSAND/UL (ref 0–1.22)
MONOCYTES NFR BLD: 3 % (ref 4–12)
MYELOCYTES NFR BLD MANUAL: 1 % (ref 0–1)
NEUTROPHILS # BLD MANUAL: 12.69 THOUSAND/UL (ref 1.85–7.62)
NEUTS SEG NFR BLD AUTO: 90 % (ref 43–75)
PLATELET # BLD AUTO: 12 THOUSANDS/UL (ref 149–390)
PLATELET BLD QL SMEAR: ABNORMAL
RBC # BLD AUTO: 3.61 MILLION/UL (ref 3.81–5.12)
RH BLD: POSITIVE
SPECIMEN EXPIRATION DATE: NORMAL
TOTAL CELLS COUNTED SPEC: 100
WBC # BLD AUTO: 14.27 THOUSAND/UL (ref 4.31–10.16)

## 2019-09-27 PROCEDURE — 86705 HEP B CORE ANTIBODY IGM: CPT

## 2019-09-27 PROCEDURE — 86704 HEP B CORE ANTIBODY TOTAL: CPT

## 2019-09-27 PROCEDURE — 86900 BLOOD TYPING SEROLOGIC ABO: CPT

## 2019-09-27 PROCEDURE — 86901 BLOOD TYPING SEROLOGIC RH(D): CPT

## 2019-09-27 PROCEDURE — 96365 THER/PROPH/DIAG IV INF INIT: CPT

## 2019-09-27 PROCEDURE — 85027 COMPLETE CBC AUTOMATED: CPT | Performed by: INTERNAL MEDICINE

## 2019-09-27 PROCEDURE — 85007 BL SMEAR W/DIFF WBC COUNT: CPT | Performed by: INTERNAL MEDICINE

## 2019-09-27 PROCEDURE — 87340 HEPATITIS B SURFACE AG IA: CPT

## 2019-09-27 PROCEDURE — 86850 RBC ANTIBODY SCREEN: CPT

## 2019-09-27 PROCEDURE — 86803 HEPATITIS C AB TEST: CPT

## 2019-09-27 RX ORDER — SODIUM CHLORIDE 9 MG/ML
20 INJECTION, SOLUTION INTRAVENOUS ONCE
Status: CANCELLED | OUTPATIENT
Start: 2019-09-28

## 2019-09-27 RX ORDER — PREDNISONE 50 MG/1
50 TABLET ORAL DAILY
Qty: 30 TABLET | Refills: 0 | Status: SHIPPED | OUTPATIENT
Start: 2019-09-27 | End: 2019-10-23 | Stop reason: DRUGHIGH

## 2019-09-27 RX ORDER — SODIUM CHLORIDE 9 MG/ML
20 INJECTION, SOLUTION INTRAVENOUS ONCE
Status: COMPLETED | OUTPATIENT
Start: 2019-09-27 | End: 2019-09-27

## 2019-09-27 RX ADMIN — SODIUM CHLORIDE 1000 MG: 0.9 INJECTION, SOLUTION INTRAVENOUS at 12:53

## 2019-09-27 RX ADMIN — SODIUM CHLORIDE 20 ML/HR: 0.9 INJECTION, SOLUTION INTRAVENOUS at 12:39

## 2019-09-27 NOTE — TELEPHONE ENCOUNTER
Juliana Morton from [facility] called with critical     Platelet 52,020    [phone] opt 2

## 2019-09-27 NOTE — PLAN OF CARE
Problem: Potential for Falls  Goal: Patient will remain free of falls  Description  INTERVENTIONS:  - Assess patient frequently for physical needs  -  Identify cognitive and physical deficits and behaviors that affect risk of falls    -  [place] fall precautions as indicated by assessment   - Educate patient/family on patient safety including physical limitations  - Instruct patient to call for assistance with activity based on assessment  - Modify environment to reduce risk of injury  - Consider OT/PT consult to assist with strengthening/mobility  Outcome: Progressing

## 2019-09-27 NOTE — PROGRESS NOTES
Pt arrived to unit without complaint,per Dr Gustavo Castro order, stat CBCD drawn prior to treatment today and additonal blood work drawn at end of infusion today  Platelets=12,000 today  Results reported to Dr Gustavo Castro via Jeral Antis  Pt tolerated day 2 IV Methyl well without adverse reaction  Pt will have repeat CBC drawn on Monday  Pt spoke to Tram Bach RN on phone and received instructions re: patient's treatment plan moving forward  Pt left unit in stable condition without question or concern  AVS provided

## 2019-09-28 LAB
HBV CORE AB SER QL: REACTIVE
HBV CORE IGM SER QL: ABNORMAL
HBV SURFACE AG SER QL: ABNORMAL
HCV AB SER QL: ABNORMAL

## 2019-09-30 ENCOUNTER — TELEPHONE (OUTPATIENT)
Dept: HEMATOLOGY ONCOLOGY | Facility: CLINIC | Age: 24
End: 2019-09-30

## 2019-09-30 ENCOUNTER — APPOINTMENT (OUTPATIENT)
Dept: LAB | Age: 24
End: 2019-09-30
Payer: COMMERCIAL

## 2019-09-30 ENCOUNTER — HOSPITAL ENCOUNTER (OUTPATIENT)
Dept: INFUSION CENTER | Facility: CLINIC | Age: 24
Discharge: HOME | End: 2019-09-30
Payer: COMMERCIAL

## 2019-09-30 VITALS
RESPIRATION RATE: 18 BRPM | DIASTOLIC BLOOD PRESSURE: 73 MMHG | TEMPERATURE: 98.6 F | SYSTOLIC BLOOD PRESSURE: 115 MMHG | HEART RATE: 67 BPM

## 2019-09-30 DIAGNOSIS — D69.3 ACUTE ITP (HCC): Primary | ICD-10-CM

## 2019-09-30 DIAGNOSIS — D69.6 THROMBOCYTOPENIA (HCC): ICD-10-CM

## 2019-09-30 PROCEDURE — 36430 TRANSFUSION BLD/BLD COMPNT: CPT

## 2019-09-30 PROCEDURE — P9037 PLATE PHERES LEUKOREDU IRRAD: HCPCS

## 2019-09-30 PROCEDURE — 96372 THER/PROPH/DIAG INJ SC/IM: CPT

## 2019-09-30 RX ORDER — SODIUM CHLORIDE 9 MG/ML
20 INJECTION, SOLUTION INTRAVENOUS ONCE
Status: CANCELLED | OUTPATIENT
Start: 2019-10-01

## 2019-09-30 RX ORDER — SODIUM CHLORIDE 9 MG/ML
20 INJECTION, SOLUTION INTRAVENOUS ONCE
Status: COMPLETED | OUTPATIENT
Start: 2019-09-30 | End: 2019-09-30

## 2019-09-30 RX ORDER — ACETAMINOPHEN 325 MG/1
650 TABLET ORAL ONCE
Status: CANCELLED | OUTPATIENT
Start: 2019-10-01

## 2019-09-30 RX ORDER — SODIUM CHLORIDE 9 MG/ML
20 INJECTION, SOLUTION INTRAVENOUS ONCE
Status: CANCELLED | OUTPATIENT
Start: 2019-09-30

## 2019-09-30 RX ORDER — DIPHENHYDRAMINE HCL 25 MG
50 TABLET ORAL ONCE
Status: CANCELLED | OUTPATIENT
Start: 2019-10-01

## 2019-09-30 RX ADMIN — SODIUM CHLORIDE 20 ML/HR: 0.9 INJECTION, SOLUTION INTRAVENOUS at 15:15

## 2019-09-30 RX ADMIN — ROMIPLOSTIM 120 MCG: 250 INJECTION, POWDER, LYOPHILIZED, FOR SOLUTION SUBCUTANEOUS at 15:42

## 2019-09-30 NOTE — PROGRESS NOTES
Patient tolerated both units of platelets well without adverse reaction  Iv left in place and wrapped  Pt aware not to get it wet

## 2019-09-30 NOTE — PLAN OF CARE
Problem: Potential for Falls  Goal: Patient will remain free of falls  Description  INTERVENTIONS:  - Assess patient frequently for physical needs  -  Identify cognitive and physical deficits and behaviors that affect risk of falls    -  [place] fall precautions as indicated by assessment   - Educate patient/family on patient safety including physical limitations  - Instruct patient to call for assistance with activity based on assessment  - Modify environment to reduce risk of injury  - Consider OT/PT consult to assist with strengthening/mobility  Outcome: Progressing

## 2019-09-30 NOTE — TELEPHONE ENCOUNTER
D/W Dr Moses Ayoub  Spoke with Tiki Banerjee at [facility] infusion and take pt today for platelets and N-plate  Spoke with pt and informed of plt ct  Pt will go today for plts and N-plate  Then Dr Moses Ayoub decided to give IVIG tomorrow and scheduled with Tiki Banerjee who will update pt when she arrives at infusion today

## 2019-09-30 NOTE — TELEPHONE ENCOUNTER
Returned patient's call  Patient is questioning her 9/27/19 Chronic Hepatitis C panel  Hep B core is showing as Reactive  Patient is concerned with results due to recent surgery  Please review with Dr Jeanie Membreno  Patient's call back # [phone]

## 2019-10-01 ENCOUNTER — TELEPHONE (OUTPATIENT)
Dept: HEMATOLOGY ONCOLOGY | Facility: CLINIC | Age: 24
End: 2019-10-01

## 2019-10-01 ENCOUNTER — HOSPITAL ENCOUNTER (OUTPATIENT)
Dept: INFUSION CENTER | Facility: CLINIC | Age: 24
Discharge: HOME | End: 2019-10-01
Payer: COMMERCIAL

## 2019-10-01 VITALS
SYSTOLIC BLOOD PRESSURE: 129 MMHG | TEMPERATURE: 97.7 F | RESPIRATION RATE: 18 BRPM | DIASTOLIC BLOOD PRESSURE: 83 MMHG | HEART RATE: 64 BPM

## 2019-10-01 DIAGNOSIS — D69.3 ACUTE ITP (HCC): ICD-10-CM

## 2019-10-01 DIAGNOSIS — D69.6 THROMBOCYTOPENIA (HCC): Primary | ICD-10-CM

## 2019-10-01 LAB
ABO GROUP BLD BPU: NORMAL
ABO GROUP BLD BPU: NORMAL
ANISOCYTOSIS BLD QL SMEAR: PRESENT
BASOPHILS # BLD MANUAL: 0 THOUSAND/UL (ref 0–0.1)
BASOPHILS NFR MAR MANUAL: 0 % (ref 0–1)
BPU ID: NORMAL
BPU ID: NORMAL
EOSINOPHIL # BLD MANUAL: 0 THOUSAND/UL (ref 0–0.4)
EOSINOPHIL NFR BLD MANUAL: 0 % (ref 0–6)
ERYTHROCYTE [DISTWIDTH] IN BLOOD BY AUTOMATED COUNT: 21.2 % (ref 11.6–15.1)
HCT VFR BLD AUTO: 33.1 % (ref 34.8–46.1)
HGB BLD-MCNC: 11.1 G/DL (ref 11.5–15.4)
LYMPHOCYTES # BLD AUTO: 0.95 THOUSAND/UL (ref 0.6–4.47)
LYMPHOCYTES # BLD AUTO: 11 % (ref 14–44)
MCH RBC QN AUTO: 30.8 PG (ref 26.8–34.3)
MCHC RBC AUTO-ENTMCNC: 33.5 G/DL (ref 31.4–37.4)
MCV RBC AUTO: 92 FL (ref 82–98)
MONOCYTES # BLD AUTO: 0.35 THOUSAND/UL (ref 0–1.22)
MONOCYTES NFR BLD: 4 % (ref 4–12)
NEUTROPHILS # BLD MANUAL: 7.35 THOUSAND/UL (ref 1.85–7.62)
NEUTS SEG NFR BLD AUTO: 85 % (ref 43–75)
PLATELET # BLD AUTO: 2 THOUSANDS/UL (ref 149–390)
PLATELET BLD QL SMEAR: ABNORMAL
RBC # BLD AUTO: 3.6 MILLION/UL (ref 3.81–5.12)
TOTAL CELLS COUNTED SPEC: 100
UNIT DISPENSE STATUS: NORMAL
UNIT DISPENSE STATUS: NORMAL
UNIT PRODUCT CODE: NORMAL
UNIT PRODUCT CODE: NORMAL
UNIT RH: NORMAL
UNIT RH: NORMAL
WBC # BLD AUTO: 8.65 THOUSAND/UL (ref 4.31–10.16)

## 2019-10-01 PROCEDURE — 85027 COMPLETE CBC AUTOMATED: CPT | Performed by: INTERNAL MEDICINE

## 2019-10-01 PROCEDURE — 96366 THER/PROPH/DIAG IV INF ADDON: CPT

## 2019-10-01 PROCEDURE — 85007 BL SMEAR W/DIFF WBC COUNT: CPT | Performed by: INTERNAL MEDICINE

## 2019-10-01 PROCEDURE — 96365 THER/PROPH/DIAG IV INF INIT: CPT

## 2019-10-01 RX ORDER — SODIUM CHLORIDE 9 MG/ML
20 INJECTION, SOLUTION INTRAVENOUS ONCE
Status: CANCELLED | OUTPATIENT
Start: 2019-10-01

## 2019-10-01 RX ORDER — SODIUM CHLORIDE 9 MG/ML
20 INJECTION, SOLUTION INTRAVENOUS ONCE
Status: DISCONTINUED | OUTPATIENT
Start: 2019-10-01 | End: 2019-10-04 | Stop reason: HOSPADM

## 2019-10-01 RX ORDER — ACETAMINOPHEN 325 MG/1
650 TABLET ORAL ONCE
Status: CANCELLED | OUTPATIENT
Start: 2019-10-01

## 2019-10-01 RX ORDER — DIPHENHYDRAMINE HCL 25 MG
50 TABLET ORAL ONCE
Status: CANCELLED | OUTPATIENT
Start: 2019-10-01

## 2019-10-01 RX ORDER — ACETAMINOPHEN 325 MG/1
650 TABLET ORAL ONCE
Status: DISCONTINUED | OUTPATIENT
Start: 2019-10-01 | End: 2019-10-04 | Stop reason: HOSPADM

## 2019-10-01 RX ORDER — ACETAMINOPHEN 325 MG/1
650 TABLET ORAL ONCE
Status: COMPLETED | OUTPATIENT
Start: 2019-10-01 | End: 2019-10-01

## 2019-10-01 RX ORDER — DIPHENHYDRAMINE HCL 25 MG
50 TABLET ORAL ONCE
Status: COMPLETED | OUTPATIENT
Start: 2019-10-01 | End: 2019-10-01

## 2019-10-01 RX ORDER — SODIUM CHLORIDE 9 MG/ML
20 INJECTION, SOLUTION INTRAVENOUS ONCE
Status: COMPLETED | OUTPATIENT
Start: 2019-10-01 | End: 2019-10-01

## 2019-10-01 RX ORDER — DIPHENHYDRAMINE HCL 25 MG
50 TABLET ORAL ONCE
Status: DISCONTINUED | OUTPATIENT
Start: 2019-10-01 | End: 2019-10-04 | Stop reason: HOSPADM

## 2019-10-01 RX ADMIN — Medication 120 G: at 11:39

## 2019-10-01 RX ADMIN — DIPHENHYDRAMINE HCL 50 MG: 25 TABLET ORAL at 10:28

## 2019-10-01 RX ADMIN — ACETAMINOPHEN 650 MG: 325 TABLET, FILM COATED ORAL at 10:28

## 2019-10-01 RX ADMIN — SODIUM CHLORIDE 20 ML/HR: 0.9 INJECTION, SOLUTION INTRAVENOUS at 10:43

## 2019-10-01 NOTE — PROGRESS NOTES
Rakesh, RN notified of platelet count of 2  No changes in orders for today  Pt states she will have another CBC on Thursday  Pt instructed on low platelet count and verbalizes understanding    Pt provided with AVS

## 2019-10-01 NOTE — PROGRESS NOTES
Per pharmacy we need to change the IVIG order from [facility] to [employer]  Reviewed with Katie Telles working on new auth however patient sitting infusion chair and platelets drawn today are now 2  Per Dr Chino Haas patient needs to get IVIG today to avoid hospitalization  Reviewed dosing with Dr Chino Haas and it should be 1000mg/kg with patient's actual recorded weigh of 121kg    120g rounded dose IV  Reviewed with Luba Villatoro  (NEW order placed for 1x IVIG)

## 2019-10-01 NOTE — PROGRESS NOTES
Spoke with Keiry, from [facility] and reviewed Dr Luz Maria Spann would like the nurse to draw a CBC with diff today at infusion before giving IVIG (okay to proceed with IVIG without waiting for results)  Keiry stated she will review with staff

## 2019-10-01 NOTE — PLAN OF CARE
Problem: Potential for Falls  Goal: Patient will remain free of falls  Description  INTERVENTIONS:  - Assess patient frequently for physical needs  -  Identify cognitive and physical deficits and behaviors that affect risk of falls    -  [place] fall precautions as indicated by assessment   - Educate patient/family on patient safety including physical limitations  - Instruct patient to call for assistance with activity based on assessment  - Modify environment to reduce risk of injury  - Consider OT/PT consult to assist with strengthening/mobility  Outcome: Progressing

## 2019-10-01 NOTE — TELEPHONE ENCOUNTER
I spoke with patient last evening  She received platelet transfusions and Nplate  She is on 50 mg prednisone daily  She will be receiving IVIG today  Blood counts will be checked again today  Question of positive hepatitis B core antibody  I will be checking with GI specialist and get back to the patient  I have sent a Tiger text to Dr Alexia Vegas

## 2019-10-02 ENCOUNTER — DOCUMENTATION (OUTPATIENT)
Dept: HEMATOLOGY ONCOLOGY | Facility: CLINIC | Age: 24
End: 2019-10-02

## 2019-10-02 ENCOUNTER — APPOINTMENT (OUTPATIENT)
Dept: LAB | Facility: CLINIC | Age: 24
End: 2019-10-02
Payer: COMMERCIAL

## 2019-10-02 ENCOUNTER — OFFICE VISIT (OUTPATIENT)
Dept: HEMATOLOGY ONCOLOGY | Facility: CLINIC | Age: 24
End: 2019-10-02
Payer: COMMERCIAL

## 2019-10-02 ENCOUNTER — OFFICE VISIT (OUTPATIENT)
Dept: SURGERY | Facility: MEDICAL CENTER | Age: 24
End: 2019-10-02

## 2019-10-02 ENCOUNTER — TELEPHONE (OUTPATIENT)
Dept: HEMATOLOGY ONCOLOGY | Facility: CLINIC | Age: 24
End: 2019-10-02

## 2019-10-02 VITALS
DIASTOLIC BLOOD PRESSURE: 82 MMHG | BODY MASS INDEX: 43.65 KG/M2 | WEIGHT: 262 LBS | TEMPERATURE: 99 F | HEIGHT: 65 IN | HEART RATE: 114 BPM | SYSTOLIC BLOOD PRESSURE: 124 MMHG | OXYGEN SATURATION: 97 % | RESPIRATION RATE: 18 BRPM

## 2019-10-02 VITALS
HEART RATE: 76 BPM | DIASTOLIC BLOOD PRESSURE: 76 MMHG | HEIGHT: 65 IN | SYSTOLIC BLOOD PRESSURE: 110 MMHG | BODY MASS INDEX: 43.55 KG/M2 | WEIGHT: 261.4 LBS | TEMPERATURE: 97.5 F

## 2019-10-02 DIAGNOSIS — D69.3 ACUTE ITP (HCC): Primary | ICD-10-CM

## 2019-10-02 DIAGNOSIS — K75.9 HEPATITIS: ICD-10-CM

## 2019-10-02 DIAGNOSIS — Z90.49 STATUS POST LAPAROSCOPIC CHOLECYSTECTOMY: Primary | ICD-10-CM

## 2019-10-02 DIAGNOSIS — D69.3 ACUTE ITP (HCC): ICD-10-CM

## 2019-10-02 LAB
ANISOCYTOSIS BLD QL SMEAR: PRESENT
BASOPHILS # BLD MANUAL: 0 THOUSAND/UL (ref 0–0.1)
BASOPHILS NFR MAR MANUAL: 0 % (ref 0–1)
EOSINOPHIL # BLD MANUAL: 0 THOUSAND/UL (ref 0–0.4)
EOSINOPHIL NFR BLD MANUAL: 0 % (ref 0–6)
ERYTHROCYTE [DISTWIDTH] IN BLOOD BY AUTOMATED COUNT: 19.7 % (ref 11.6–15.1)
HCT VFR BLD AUTO: 29.9 % (ref 34.8–46.1)
HGB BLD-MCNC: 10.5 G/DL (ref 11.5–15.4)
LYMPHOCYTES # BLD AUTO: 1.98 THOUSAND/UL (ref 0.6–4.47)
LYMPHOCYTES # BLD AUTO: 16 % (ref 14–44)
MCH RBC QN AUTO: 31.5 PG (ref 26.8–34.3)
MCHC RBC AUTO-ENTMCNC: 35.1 G/DL (ref 31.4–37.4)
MCV RBC AUTO: 90 FL (ref 82–98)
MONOCYTES # BLD AUTO: 0.74 THOUSAND/UL (ref 0–1.22)
MONOCYTES NFR BLD: 6 % (ref 4–12)
NEUTROPHILS # BLD MANUAL: 9.63 THOUSAND/UL (ref 1.85–7.62)
NEUTS SEG NFR BLD AUTO: 78 % (ref 43–75)
PLATELET # BLD AUTO: 48 THOUSANDS/UL (ref 149–390)
PLATELET BLD QL SMEAR: ABNORMAL
PMV BLD AUTO: 13.6 FL (ref 8.9–12.7)
RBC # BLD AUTO: 3.33 MILLION/UL (ref 3.81–5.12)
TOTAL CELLS COUNTED SPEC: 100
WBC # BLD AUTO: 12.35 THOUSAND/UL (ref 4.31–10.16)

## 2019-10-02 PROCEDURE — 36415 COLL VENOUS BLD VENIPUNCTURE: CPT

## 2019-10-02 PROCEDURE — 85027 COMPLETE CBC AUTOMATED: CPT

## 2019-10-02 PROCEDURE — 99214 OFFICE O/P EST MOD 30 MIN: CPT | Performed by: INTERNAL MEDICINE

## 2019-10-02 PROCEDURE — 85007 BL SMEAR W/DIFF WBC COUNT: CPT

## 2019-10-02 PROCEDURE — 99024 POSTOP FOLLOW-UP VISIT: CPT | Performed by: SURGERY

## 2019-10-02 NOTE — TELEPHONE ENCOUNTER
Called patient and reviewed Dr Amy Ruby would like patient to go for STAT CBC with Diff today in [facility] and then come to the office for a visit today with him  Reviewed she can come at anytime  Patient stated she will call her Dad and see if he can bring her in the next 1-2 hours for a lab and then the office  Order placed for STAT labs

## 2019-10-02 NOTE — PROGRESS NOTES
Spoke with Keiry from [address] [place] [address] and reviewed she should cancel all NPLATE appts

## 2019-10-02 NOTE — PROGRESS NOTES
Assessment/Plan: Norah Floyd is a 30year old female who presents today in post-operative state status post laparoscopic cholecystectomy performed on 9/19/19  Pathology showed acute and chronic cholecystitis and cholelithiasis  Patient is doing well after the surgery  Explained to her that her symptoms should improve with time  Patient still has lifting restrictions of no greater than 20 pounds for two more weeks  Light impact cardiovascular activities are permissible at this time  She should avoid core exercises and heavy weight lifting for 4-6 more weeks  She may follow up as needed  She knows to contact the office if any questions or concerns arise  No problem-specific Assessment & Plan notes found for this encounter  Diagnoses and all orders for this visit:    Status post laparoscopic cholecystectomy          Subjective:      Patient ID: Norah Floyd is a 30 y o  female  Norah Floyd is a 30year old female who presents today in post-operative state status post laparoscopic cholecystectomy performed on 9/19/19  Patient reports having a low platelet count  She says that she had blood transfusion yesterday  She says that she does not feel that her platelets levels are low, she has no symptoms  She is seeing Dr Willis Price later on today  She reports having back pain but she thinks it's because she is not using her core mucles much  She is eating well  She has diarrhea occasionally but it's not persistent  She having a blood work done tomorrow  The following portions of the patient's history were reviewed and updated as appropriate: allergies, current medications, past family history, past medical history, past social history, past surgical history and problem list     Review of Systems   Constitutional: Negative  HENT: Negative  Eyes: Negative  Respiratory: Negative  Cardiovascular: Negative  Gastrointestinal: Negative  Endocrine: Negative      Genitourinary: Negative  Musculoskeletal: Positive for back pain  Skin: Negative  Allergic/Immunologic: Negative  Neurological: Negative  Hematological: Negative  Psychiatric/Behavioral: Negative  Objective:      /76   Pulse 76   Temp 97 5 °F (36 4 °C) (Tympanic)   Ht 5' 5" (1 651 m)   Wt 119 kg (261 lb 6 4 oz)   BMI 43 50 kg/m²          Physical Exam   Constitutional: She appears well-developed and well-nourished  No distress  Cardiovascular: Normal rate, regular rhythm, normal heart sounds and intact distal pulses  Pulmonary/Chest: Effort normal and breath sounds normal  No stridor  No respiratory distress  Abdominal: Soft  Bowel sounds are normal  She exhibits no distension and no mass  There is no tenderness  There is no rebound and no guarding  Skin: She is not diaphoretic  Incisions are healing well   Nursing note and vitals reviewed  By signing my name below, Shine Mono, attest that this documentation has been prepared under the direction and in the presence of Constantino Agarwal MD  Electronically Signed: Wolm Holstein, Scribe  10/2/19  Evelyn Melchor, personally performed the services described in this documentation  All medical record entries made by the gui were at my direction and in my presence  I have reviewed the chart and discharge instructions and agree that the record reflects my personal performance and is accurate and complete  Constantino Agarwal MD  10/2/19

## 2019-10-02 NOTE — PROGRESS NOTES
PER DR GRIER, ALL FUTURE NPLATE STANDING ORDER DISCONTINUED DUE TO INSURANCE DENIAL (PER Andrew Johnson)

## 2019-10-03 NOTE — PROGRESS NOTES
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 and on the lips  Presently she is on prednisone 50 mg daily  She had pulse doses of Solu-Medrol 1 g daily for 3 days initially and 1 g daily for 2 days again recently  She has received a total of 3 doses of high dose IG IV therapy, 2 g per kg initially and 1 gram/kg yesterday  She has received 1 dose of Nplate because platelet counts were very low like 3000 and were not coming up  There was initial response to IVIG therapy and platelet count had come up to within normal range but then dropped quickly  Between all this she had acute cholecystitis and had gallbladder surgery on 09/19/19  Yesterday platelet count was 5025  Today platelet count is 15640  Maybe she is responding to IVIG therapy again or to 1 dose of Nplate or to steroids finally? Since yesterday she has not noticed any new bruising  Bleeding spots from the lips have disappeared     She has some tiredness         Current Outpatient Medications:     ALPRAZolam (XANAX) 0 25 mg tablet, Take 1 tablet (0 25 mg total) by mouth daily as needed for anxiety, Disp: 10 tablet, Rfl: 0    cetirizine (ZyrTEC) 10 mg tablet, Take 10 mg by mouth as needed , Disp: , Rfl:     Multiple Vitamin (MULTIVITAMIN) tablet, Take 1 tablet by mouth daily, Disp: , Rfl:     predniSONE 50 mg tablet, Take 1 tablet (50 mg total) by mouth daily, Disp: 30 tablet, Rfl: 0  No current facility-administered medications for this visit       Facility-Administered Medications Ordered in Other Visits:     acetaminophen (TYLENOL) tablet 650 mg, 650 mg, Oral, Once, Georgette Claude, MD    diphenhydrAMINE (BENADRYL) tablet 50 mg, 50 mg, Oral, Once, Georgette Claude, MD    sodium chloride 0 9 % infusion, 20 mL/hr, Intravenous, Once, Georgette Claude, MD    sodium chloride 0 9 % infusion, 20 mL/hr, Intravenous, Once, Georgette Claude, MD    Allergies Allergen Reactions    Pollen Extract         No history exists  ROS:  10/02/19 Reviewed 13 systems:  Presently no headaches, seizures, dizziness, diplopia, dysphagia, hoarseness, chest pain, palpitations, shortness of breath, cough, hemoptysis, abdominal pain, nausea, vomiting, change in bowel habits, melena, hematuria, fever, chills, active bleeding, bone pains, skin rash, weight loss, arthritic symptoms,   weakness, numbness,  claudication and gait problem  No frequent infections  Not unusually sensitive to heat or cold  No swelling of the ankles  No swollen glands  Patient is anxious  Other symptoms are in HPI        /82 (BP Location: Left arm, Patient Position: Sitting, Cuff Size: Adult)   Pulse (!) 114   Temp 99 °F (37 2 °C)   Resp 18   Ht 5' 5 25" (1 657 m)   Wt 119 kg (262 lb)   SpO2 97%   BMI 43 27 kg/m²     Physical Exam:  Alert, oriented, not in distress, no icterus, no oral thrush, no palpable neck mass, clear lung fields, regular heart rate, abdomen  soft and non tender, no palpable abdominal mass, no ascites, no edema of ankles, no calf tenderness, no focal neurological deficit, no skin rash, no palpable lymphadenopathy in the neck and axillary areas, good arterial pulses, no clubbing  Patient is anxious  Performance status 1  Few bruises on the arms, lower abdomen and legs      IMAGING:      LABS:  Results for orders placed or performed in visit on 10/02/19   CBC and differential   Result Value Ref Range    WBC 12 35 (H) 4 31 - 10 16 Thousand/uL    RBC 3 33 (L) 3 81 - 5 12 Million/uL    Hemoglobin 10 5 (L) 11 5 - 15 4 g/dL    Hematocrit 29 9 (L) 34 8 - 46 1 %    MCV 90 82 - 98 fL    MCH 31 5 26 8 - 34 3 pg    MCHC 35 1 31 4 - 37 4 g/dL    RDW 19 7 (H) 11 6 - 15 1 %    MPV 13 6 (H) 8 9 - 12 7 fL    Platelets 48 (LL) 240 - 390 Thousands/uL   Manual Differential(PHLEBS Do Not Order)   Result Value Ref Range    Segmented % 78 (H) 43 - 75 %    Lymphocytes % 16 14 - 44 % Monocytes % 6 4 - 12 %    Eosinophils, % 0 0 - 6 %    Basophils % 0 0 - 1 %    Absolute Neutrophils 9 63 (H) 1 85 - 7 62 Thousand/uL    Lymphocytes Absolute 1 98 0 60 - 4 47 Thousand/uL    Monocytes Absolute 0 74 0 00 - 1 22 Thousand/uL    Eosinophils Absolute 0 00 0 00 - 0 40 Thousand/uL    Basophils Absolute 0 00 0 00 - 0 10 Thousand/uL    Total Counted 100     Anisocytosis Present     Platelet Estimate Decreased (A) Adequate     Labs, Imaging, & Other studies:   All pertinent labs and imaging studies were personally reviewed    Lab Results   Component Value Date    K 3 7 09/30/2019     09/30/2019    CO2 29 09/30/2019    BUN 18 09/30/2019    CREATININE 0 82 09/30/2019    GLUF 68 09/30/2019    CALCIUM 8 8 09/30/2019    AST 13 09/30/2019    ALT 64 09/30/2019    ALKPHOS 51 09/30/2019    EGFR 99 09/30/2019     Lab Results   Component Value Date    WBC 12 35 (H) 10/02/2019    HGB 10 5 (L) 10/02/2019    HCT 29 9 (L) 10/02/2019    MCV 90 10/02/2019    PLT 48 (LL) 10/02/2019       Reviewed and discussed with patient  Assessment and plan: Wendy Willams Acute ITP with bruises and bleeding spots in the oral cavity and on the lips  Presently she is on prednisone 50 mg daily  She had pulse doses of Solu-Medrol 1 g daily for 3 days initially and 1 g daily for 2 days again recently  She has received a total of 3 doses of high dose IG IV therapy, 2 g per kg initially and 1 gram/kg yesterday  She has received 1 dose of Nplate because platelet counts were very low like 3000 and were not coming up  There was initial response to IVIG therapy and platelet count had come up to within normal range but then dropped quickly  Between all this she had acute cholecystitis and had gallbladder surgery on 09/19/19  Yesterday platelet count was 5734  Today platelet count is 50892  Maybe she is responding to IVIG therapy again or to 1 dose of Nplate or to steroids finally? Since yesterday she has not noticed any new bruising    Bleeding spots from the lips have disappeared     She has some tiredness    Patient has developed anemia too now  She is being continued on 50 mg prednisone daily  Blood counts will be checked again on 10/04/19 and twice a week after that  Additional treatments to be decided  She tested positive for hepatitis B core antibody  Blood will be drawn for hepatitis B surface antibody  I have been in touch with our liver specialist Dr Colon about the safety of using Rituxan  All discussed in detail  Questions answered  Discussed treatment options for acute ITP that might become chronic if  thrombocytopenia persisted after 6 months  If hemoglobin continued to drop she will be checked for autoimmune hemolytic anemia or bleeding  All discussed in very much detail  Questions answered     Patient knows to avoid aspirin, NSAIDs, other blood thinners and trauma  For active bleeding or other medical emergencies she will be taken to the emergency room  1  Acute ITP (HCC)    - CBC and differential; Future  - Hepatitis B surface antibody; Future    2  Hepatitis    - Hepatitis B surface antibody; Future            Patient voiced understanding and agreement in the discussion  Counseling / Coordination of Care   Greater than 50% of total time was spent with the patient and / or family counseling and / or coordination of care

## 2019-10-04 ENCOUNTER — TELEPHONE (OUTPATIENT)
Dept: HEMATOLOGY ONCOLOGY | Facility: CLINIC | Age: 24
End: 2019-10-04

## 2019-10-04 ENCOUNTER — APPOINTMENT (OUTPATIENT)
Dept: LAB | Age: 24
End: 2019-10-04
Payer: COMMERCIAL

## 2019-10-04 DIAGNOSIS — K75.9 HEPATITIS: ICD-10-CM

## 2019-10-04 DIAGNOSIS — D59.8 OTHER ACQUIRED HEMOLYTIC ANEMIAS (HCC): Primary | ICD-10-CM

## 2019-10-04 DIAGNOSIS — D59.8 OTHER ACQUIRED HEMOLYTIC ANEMIAS (HCC): ICD-10-CM

## 2019-10-04 DIAGNOSIS — D69.3 ACUTE ITP (HCC): ICD-10-CM

## 2019-10-04 LAB
BASOPHILS # BLD AUTO: 0.06 THOUSANDS/ΜL (ref 0–0.1)
BASOPHILS NFR BLD AUTO: 0 % (ref 0–1)
EOSINOPHIL # BLD AUTO: 0.15 THOUSAND/ΜL (ref 0–0.61)
EOSINOPHIL NFR BLD AUTO: 1 % (ref 0–6)
ERYTHROCYTE [DISTWIDTH] IN BLOOD BY AUTOMATED COUNT: 22.1 % (ref 11.6–15.1)
HBV SURFACE AB SER-ACNC: >1000 MIU/ML
HCT VFR BLD AUTO: 27.3 % (ref 34.8–46.1)
HGB BLD-MCNC: 9.3 G/DL (ref 11.5–15.4)
IMM GRANULOCYTES # BLD AUTO: >0.5 THOUSAND/UL (ref 0–0.2)
IMM GRANULOCYTES NFR BLD AUTO: 5 % (ref 0–2)
LYMPHOCYTES # BLD AUTO: 4.36 THOUSANDS/ΜL (ref 0.6–4.47)
LYMPHOCYTES NFR BLD AUTO: 26 % (ref 14–44)
MCH RBC QN AUTO: 32.3 PG (ref 26.8–34.3)
MCHC RBC AUTO-ENTMCNC: 34.1 G/DL (ref 31.4–37.4)
MCV RBC AUTO: 95 FL (ref 82–98)
MONOCYTES # BLD AUTO: 1.17 THOUSAND/ΜL (ref 0.17–1.22)
MONOCYTES NFR BLD AUTO: 7 % (ref 4–12)
NEUTROPHILS # BLD AUTO: 10.34 THOUSANDS/ΜL (ref 1.85–7.62)
NEUTS SEG NFR BLD AUTO: 61 % (ref 43–75)
NRBC BLD AUTO-RTO: 2 /100 WBCS
PLATELET # BLD AUTO: 130 THOUSANDS/UL (ref 149–390)
PMV BLD AUTO: 12.5 FL (ref 8.9–12.7)
RBC # BLD AUTO: 2.88 MILLION/UL (ref 3.81–5.12)
RETICS # AUTO: ABNORMAL 10*3/UL (ref 14097–95744)
RETICS # CALC: 11.43 % (ref 0.37–1.87)
WBC # BLD AUTO: 16.98 THOUSAND/UL (ref 4.31–10.16)

## 2019-10-04 PROCEDURE — 85045 AUTOMATED RETICULOCYTE COUNT: CPT

## 2019-10-04 PROCEDURE — 86706 HEP B SURFACE ANTIBODY: CPT

## 2019-10-04 PROCEDURE — 85025 COMPLETE CBC W/AUTO DIFF WBC: CPT

## 2019-10-04 NOTE — TELEPHONE ENCOUNTER
Discussed results with patient  She will have blood counts on 10/07/19    Also ordered reticulocyte, LDH haptoglobin, direct bilirubin and Carlos Alberto test

## 2019-10-07 ENCOUNTER — TELEPHONE (OUTPATIENT)
Dept: HEMATOLOGY ONCOLOGY | Facility: CLINIC | Age: 24
End: 2019-10-07

## 2019-10-07 ENCOUNTER — APPOINTMENT (OUTPATIENT)
Dept: LAB | Age: 24
End: 2019-10-07
Payer: COMMERCIAL

## 2019-10-07 DIAGNOSIS — D59.8 OTHER ACQUIRED HEMOLYTIC ANEMIAS (HCC): ICD-10-CM

## 2019-10-07 DIAGNOSIS — D59.8 OTHER ACQUIRED HEMOLYTIC ANEMIAS (HCC): Primary | ICD-10-CM

## 2019-10-07 DIAGNOSIS — D64.9 ANEMIA, UNSPECIFIED TYPE: ICD-10-CM

## 2019-10-07 LAB — DAT POLY-SP REAG RBC QL: NEGATIVE

## 2019-10-07 PROCEDURE — 83010 ASSAY OF HAPTOGLOBIN QUANT: CPT

## 2019-10-07 PROCEDURE — 86880 COOMBS TEST DIRECT: CPT

## 2019-10-07 RX ORDER — FOLIC ACID 1 MG/1
1 TABLET ORAL DAILY
Qty: 30 TABLET | Refills: 2 | Status: SHIPPED | OUTPATIENT
Start: 2019-10-07 | End: 2020-01-15

## 2019-10-07 NOTE — TELEPHONE ENCOUNTER
I gave blood test results to the patient and explained that IVIG therapy has brought up the platelet counts but has caused some hemolysis  She will start taking 1 mg folic acid daily  She is taking 1 iron tablet daily that she may not need  Blood will be checked for ferritin to decide that  I explained liver function tests and hepatitis tests to her  To decide about lowering dose of prednisone and lowering volume of IVIG  Patient will be going for blood tests this Thursday

## 2019-10-08 LAB — HAPTOGLOB SERPL-MCNC: <10 MG/DL (ref 34–200)

## 2019-10-10 ENCOUNTER — TELEPHONE (OUTPATIENT)
Dept: INTERNAL MEDICINE CLINIC | Age: 24
End: 2019-10-10

## 2019-10-10 ENCOUNTER — APPOINTMENT (OUTPATIENT)
Dept: LAB | Age: 24
End: 2019-10-10
Payer: COMMERCIAL

## 2019-10-10 ENCOUNTER — TELEPHONE (OUTPATIENT)
Dept: HEMATOLOGY ONCOLOGY | Facility: CLINIC | Age: 24
End: 2019-10-10

## 2019-10-10 DIAGNOSIS — D69.3 ACUTE ITP (HCC): ICD-10-CM

## 2019-10-10 NOTE — PROGRESS NOTES
Assessment/Plan:    Cholecystitis  - secondary to cholelithiasis and status post cholecystectomy on September 19th, 2019  -patient is healing well    ITP  - last CBC done on October 10th, 2019 showed a platelet count of 736, up from 358 on October 7th, 2019 and hemoglobin stable at 8 8 and a WBC of 10 32, down from 14 58  - patient is currently on  prednisone  -follow-up with Hematology Oncology    Anxiety disorder  -well controlled on alprazolam  -will refill alprazolam  -the [facility] web site was interrogated and did not show misuse    Seasonal allergies  -continue with cetirizine as needed    Need for influenza vaccination  -I counseled patient to confirm with her hematologist because of her recent immunological disorder before we give her the flu shot  Diagnoses and all orders for this visit:    Cholecystitis    Acute ITP ([facility] [address] )    Other specified anxiety disorders  -     ALPRAZolam (XANAX) 0 25 mg tablet; Take 1 tablet (0 25 mg total) by mouth daily as needed for anxiety    Seasonal allergies    Need for influenza vaccination          Subjective:      Patient ID: Jennie Suh is a 30 y o  female  HPI  Patient presents for a follow-up visit regarding her anxiety, ITP and cholecystitis  She had a cholecystectomy done last month for cholelithiasis and cholecystitis and is doing very well  She states that she occasionally has abdominal pain at the site of the large  laparoscopic incision but the pain is down to 1/10  She denies any more petechia, hematuria, hematochezia, gingival bleeding  She denies fever, chills, night sweats, chest pain, shortness of breath, cough, diarrhea, constipation, myalgia arthralgias  She has been having more episodes of anxiety and would like a refill of her Xanax  The following portions of the patient's history were reviewed and updated as appropriate:   She  has a past medical history of Anxiety    She   Patient Active Problem List    Diagnosis Date Noted    Need for influenza vaccination 10/12/2019    Cholecystitis 09/18/2019    Acute ITP ([facility] ) 09/11/2019    Hypokalemia 09/11/2019    Petechiae 09/03/2019    Thrombocytopenia (HCC) 09/03/2019    Seasonal allergies 07/01/2019    Other specified anxiety disorders 07/01/2019    Morbid obesity ([facility] ) 07/01/2019     She  has a past surgical history that includes [place] tooth extraction and CHOLECYSTECTOMY LAPAROSCOPIC (N/A, 9/19/2019)  Her family history includes Anxiety disorder in her maternal grandmother; Diabetes in her paternal aunt and paternal uncle; Hypertension in her father  She  reports that she has never smoked  She has never used smokeless tobacco  She reports that she drinks alcohol  She reports that she does not use drugs  Current Outpatient Medications   Medication Sig Dispense Refill    ALPRAZolam (XANAX) 0 25 mg tablet Take 1 tablet (0 25 mg total) by mouth daily as needed for anxiety 10 tablet 0    cetirizine (ZyrTEC) 10 mg tablet Take 10 mg by mouth as needed       folic acid (FOLVITE) 1 mg tablet Take 1 tablet (1 mg total) by mouth daily 30 tablet 2    Multiple Vitamin (MULTIVITAMIN) tablet Take 1 tablet by mouth daily      predniSONE 50 mg tablet Take 1 tablet (50 mg total) by mouth daily (Patient taking differently: Take 25 mg by mouth daily ) 30 tablet 0     No current facility-administered medications for this visit  Current Outpatient Medications on File Prior to Visit   Medication Sig    cetirizine (ZyrTEC) 10 mg tablet Take 10 mg by mouth as needed     folic acid (FOLVITE) 1 mg tablet Take 1 tablet (1 mg total) by mouth daily    Multiple Vitamin (MULTIVITAMIN) tablet Take 1 tablet by mouth daily    predniSONE 50 mg tablet Take 1 tablet (50 mg total) by mouth daily (Patient taking differently: Take 25 mg by mouth daily )     No current facility-administered medications on file prior to visit  She is allergic to pollen extract       Review of Systems   Constitutional: Negative for activity change, chills, fatigue, fever and unexpected weight change  HENT: Negative for ear pain, postnasal drip, rhinorrhea, sinus pressure and sore throat  Eyes: Negative for pain  Respiratory: Negative for cough, choking, chest tightness, shortness of breath and wheezing  Cardiovascular: Negative for chest pain, palpitations and leg swelling  Gastrointestinal: Positive for abdominal pain and diarrhea (on and off)  Negative for constipation, nausea and vomiting  Genitourinary: Negative for dysuria and hematuria  Musculoskeletal: Negative for arthralgias, back pain, gait problem, joint swelling, myalgias and neck stiffness  Skin: Positive for color change (echymosis healing)  Negative for pallor and rash  Neurological: Negative for dizziness, tremors, seizures, syncope, light-headedness and headaches  Hematological: Negative for adenopathy  Psychiatric/Behavioral: Negative for behavioral problems  The patient is nervous/anxious            Past Medical History:   Diagnosis Date    Anxiety          Current Outpatient Medications:     ALPRAZolam (XANAX) 0 25 mg tablet, Take 1 tablet (0 25 mg total) by mouth daily as needed for anxiety, Disp: 10 tablet, Rfl: 0    cetirizine (ZyrTEC) 10 mg tablet, Take 10 mg by mouth as needed , Disp: , Rfl:     folic acid (FOLVITE) 1 mg tablet, Take 1 tablet (1 mg total) by mouth daily, Disp: 30 tablet, Rfl: 2    Multiple Vitamin (MULTIVITAMIN) tablet, Take 1 tablet by mouth daily, Disp: , Rfl:     predniSONE 50 mg tablet, Take 1 tablet (50 mg total) by mouth daily (Patient taking differently: Take 25 mg by mouth daily ), Disp: 30 tablet, Rfl: 0    Allergies   Allergen Reactions    Pollen Extract        Social History   Past Surgical History:   Procedure Laterality Date    CHOLECYSTECTOMY LAPAROSCOPIC N/A 9/19/2019    Procedure: CHOLECYSTECTOMY LAPAROSCOPIC;  Surgeon: Abdoulaye Estrada MD;  Location: [facility];  Service: [facility] TOOTH EXTRACTION       Family History   Problem Relation Age of Onset    Hypertension Father     Anxiety disorder Maternal Grandmother     Diabetes Paternal Aunt     Diabetes Paternal Uncle        Objective:  /88 (BP Location: Left arm, Patient Position: Sitting, Cuff Size: Standard)   Pulse (!) 112   Temp 98 8 °F (37 1 °C) (Tympanic)   Ht 5' 5 25" (1 657 m)   Wt 121 kg (267 lb)   SpO2 98%   BMI 44 09 kg/m²        Physical Exam   Constitutional: She is oriented to person, place, and time  She appears well-developed and well-nourished  No distress  Morbidly obese   HENT:   Head: Normocephalic and atraumatic  Right Ear: External ear normal    Left Ear: External ear normal    Nose: Mucosal edema (nasal mucosa erythema) present  Mouth/Throat: Mucous membranes are dry  No oropharyngeal exudate  Dry mucous membranes   Eyes: Pupils are equal, round, and reactive to light  Conjunctivae and EOM are normal  Right eye exhibits no discharge  Left eye exhibits no discharge  No scleral icterus  Neck: Normal range of motion  Neck supple  No JVD present  No tracheal deviation present  No thyromegaly present  Cardiovascular: Normal rate, regular rhythm, normal heart sounds and intact distal pulses  Exam reveals no gallop and no friction rub  No murmur heard  Pulmonary/Chest: Effort normal and breath sounds normal  No respiratory distress  She has no wheezes  She has no rales  She exhibits no tenderness  Abdominal: Soft  Bowel sounds are normal  She exhibits no distension and no mass  There is no tenderness  There is no rebound and no guarding  Musculoskeletal: Normal range of motion  She exhibits no edema, tenderness or deformity  Lymphadenopathy:     She has no cervical adenopathy  Neurological: She is alert and oriented to person, place, and time  She has normal reflexes  No cranial nerve deficit  She exhibits normal muscle tone  Coordination normal    Skin: Skin is warm and dry   No rash noted  She is not diaphoretic  There is erythema (Healing ecchymoses on her bilateral upper extremity)  No pallor  Psychiatric: She has a normal mood and affect   Her behavior is normal

## 2019-10-11 ENCOUNTER — OFFICE VISIT (OUTPATIENT)
Dept: INTERNAL MEDICINE CLINIC | Age: 24
End: 2019-10-11
Payer: COMMERCIAL

## 2019-10-11 VITALS
BODY MASS INDEX: 44.48 KG/M2 | WEIGHT: 267 LBS | DIASTOLIC BLOOD PRESSURE: 88 MMHG | HEART RATE: 112 BPM | HEIGHT: 65 IN | SYSTOLIC BLOOD PRESSURE: 130 MMHG | OXYGEN SATURATION: 98 % | TEMPERATURE: 98.8 F

## 2019-10-11 DIAGNOSIS — K81.9 CHOLECYSTITIS: Primary | ICD-10-CM

## 2019-10-11 DIAGNOSIS — F41.8 OTHER SPECIFIED ANXIETY DISORDERS: ICD-10-CM

## 2019-10-11 DIAGNOSIS — J30.2 SEASONAL ALLERGIES: ICD-10-CM

## 2019-10-11 DIAGNOSIS — D69.3 ACUTE ITP (HCC): ICD-10-CM

## 2019-10-11 DIAGNOSIS — Z23 NEED FOR INFLUENZA VACCINATION: ICD-10-CM

## 2019-10-11 PROCEDURE — 99214 OFFICE O/P EST MOD 30 MIN: CPT | Performed by: INTERNAL MEDICINE

## 2019-10-11 RX ORDER — ALPRAZOLAM 0.25 MG/1
0.25 TABLET ORAL DAILY PRN
Qty: 10 TABLET | Refills: 0 | Status: SHIPPED | OUTPATIENT
Start: 2019-10-11 | End: 2021-06-03 | Stop reason: SDUPTHER

## 2019-10-11 NOTE — TELEPHONE ENCOUNTER
Gave blood count results to the patient  Prednisone dose is being decreased to 25 mg daily from 50 mg daily  Counts will be checked on 10/14/19 and she has visit with Monalisa Zhao on 10/16/2019    Discussed with Monalisa Zhao my physician assistant

## 2019-10-11 NOTE — PATIENT INSTRUCTIONS
Immune Thrombocytopenia   AMBULATORY CARE:   Immune thrombocytopenia  is a bleeding disorder  Immune thrombocytopenia may happen when your immune system attacks and destroys your platelets  This causes low platelet levels  Platelets are cells that help the blood clot (stop bleeding)  When platelet levels are low, bleeding may occur anywhere in your body  Immune thrombocytopenia may also be called idiopathic thrombocytopenia  Common signs and symptoms include the following: Your signs and symptoms will depend on your platelet levels  You may have no symptoms if your platelet levels are normal  You may have any of the following:  · Bruising or tiny red or purple spots (purpura) on the skin or mucus membranes    · Bleeding from your gums or nose    · Heavy menstrual bleeding in women    · Blood in your urine or bowel movements  Call 911 or have someone else call for any of the following:   · You have any of the following signs of a heart attack:      ¨ Squeezing, pressure, or pain in your chest that lasts longer than 5 minutes or returns    ¨ Discomfort or pain in your back, neck, jaw, stomach, or arm     ¨ Trouble breathing    ¨ Nausea or vomiting    ¨ Lightheadedness or a sudden cold sweat, especially with chest pain or trouble breathing    · You have any of the following signs of a stroke:      ¨ Numbness or drooping on one side of your face     ¨ Weakness in an arm or leg    ¨ Confusion or difficulty speaking    ¨ Dizziness, a severe headache, or vision loss    · You fall and hit your head  · You have a seizure  · You cannot be woken  Seek care immediately if:   · You have bleeding that does not stop or becomes heavier  · You have a bruise that suddenly gets larger  · You vomit blood or material that looks like coffee grounds  · Your arm or leg feels looks bigger than normal and is warm or painful  · You suddenly feel lightheaded, dizzy, or weak       · You become confused or have trouble thinking clearly  Contact your healthcare provider if:   · You have bleeding from your gums, mouth, or nose  · You have irregular or heavy menstrual bleeding  · You have blood in your urine or bowel movement  · You have abdominal pain  · You see new bruises or small red or purple spots on your skin  · You have questions or concerns about your condition or care  Treatment for immune thrombocytopenia  will depend on your platelet levels and symptoms  You may not need treatment if you have normal platelet levels or no symptoms  You may need medicine to prevent your immune system from destroying platelets  Medicine may also be needed to help increase platelet production and prevent bleeding  You will need platelet transfusions if your platelet levels are low or you have heavy bleeding  You may need surgery to remove your spleen if other treatments do not work  Self-care:   · Care for your skin  Use a soft toothbrush to keep your skin and gums from bleeding  Use lip balm to prevent your lips from cracking  Use a soft washcloth during baths or showers  Apply lotion to dry skin  Do not cut your nails too short  If you shave, use an electric shaver  Wear slippers or shoes to protect your feet  · Do not do activities that may cause injury  Ask your healthcare provider what activities are safe to do  You may not be able to play contact sports such as football, hockey, or wrestling  · Do not take aspirin or NSAIDs  These medicines can lower platelet levels  This may cause you to bleed and bruise more easily  · Care for cuts, scrapes, or nosebleeds  Apply firm, steady, pressure to cuts or scrapes  Use gauze or a clean towel  If possible, elevate the body part above the level of your heart  If your nose bleeds, pinch the top of your nose and your nostrils  Do this until bleeding stops  · Carry medical alert identification    Wear jewelry or carry a card that says you have thrombocytopenia  Ask your healthcare provider where to get these items  Follow up with your healthcare provider as directed: You will need to return for blood tests to check your platelet levels  Write down your questions so you remember to ask them during your visits  © 2017 [address] [employer] Information is for End User's use only and may not be sold, redistributed or otherwise used for commercial purposes  All illustrations and images included in CareNotes® are the copyrighted property of A D A M [employer]  or Jeffrey Mayo  The above information is an  only  It is not intended as medical advice for individual conditions or treatments  Talk to your doctor, nurse or pharmacist before following any medical regimen to see if it is safe and effective for you

## 2019-10-12 PROBLEM — Z23 NEED FOR INFLUENZA VACCINATION: Status: ACTIVE | Noted: 2019-10-12

## 2019-10-14 ENCOUNTER — APPOINTMENT (OUTPATIENT)
Dept: LAB | Age: 24
End: 2019-10-14
Payer: COMMERCIAL

## 2019-10-14 ENCOUNTER — TELEPHONE (OUTPATIENT)
Dept: HEMATOLOGY ONCOLOGY | Facility: CLINIC | Age: 24
End: 2019-10-14

## 2019-10-14 LAB
ALBUMIN SERPL BCP-MCNC: 3.2 G/DL (ref 3.5–5)
ALP SERPL-CCNC: 49 U/L (ref 46–116)
ALT SERPL W P-5'-P-CCNC: 50 U/L (ref 12–78)
ANION GAP SERPL CALCULATED.3IONS-SCNC: 7 MMOL/L (ref 4–13)
AST SERPL W P-5'-P-CCNC: 17 U/L (ref 5–45)
BASOPHILS # BLD AUTO: 0.02 THOUSANDS/ΜL (ref 0–0.1)
BASOPHILS NFR BLD AUTO: 0 % (ref 0–1)
BILIRUB SERPL-MCNC: 0.48 MG/DL (ref 0.2–1)
BUN SERPL-MCNC: 13 MG/DL (ref 5–25)
CALCIUM SERPL-MCNC: 9.1 MG/DL (ref 8.3–10.1)
CHLORIDE SERPL-SCNC: 106 MMOL/L (ref 100–108)
CO2 SERPL-SCNC: 26 MMOL/L (ref 21–32)
CREAT SERPL-MCNC: 0.71 MG/DL (ref 0.6–1.3)
EOSINOPHIL # BLD AUTO: 0.04 THOUSAND/ΜL (ref 0–0.61)
EOSINOPHIL NFR BLD AUTO: 1 % (ref 0–6)
ERYTHROCYTE [DISTWIDTH] IN BLOOD BY AUTOMATED COUNT: 22 % (ref 11.6–15.1)
GFR SERPL CREATININE-BSD FRML MDRD: 118 ML/MIN/1.73SQ M
GLUCOSE P FAST SERPL-MCNC: 64 MG/DL (ref 65–99)
HCT VFR BLD AUTO: 30.7 % (ref 34.8–46.1)
HGB BLD-MCNC: 9.6 G/DL (ref 11.5–15.4)
IMM GRANULOCYTES # BLD AUTO: 0.1 THOUSAND/UL (ref 0–0.2)
IMM GRANULOCYTES NFR BLD AUTO: 1 % (ref 0–2)
LYMPHOCYTES # BLD AUTO: 2.71 THOUSANDS/ΜL (ref 0.6–4.47)
LYMPHOCYTES NFR BLD AUTO: 31 % (ref 14–44)
MCH RBC QN AUTO: 32 PG (ref 26.8–34.3)
MCHC RBC AUTO-ENTMCNC: 31.3 G/DL (ref 31.4–37.4)
MCV RBC AUTO: 102 FL (ref 82–98)
MONOCYTES # BLD AUTO: 0.58 THOUSAND/ΜL (ref 0.17–1.22)
MONOCYTES NFR BLD AUTO: 7 % (ref 4–12)
NEUTROPHILS # BLD AUTO: 5.22 THOUSANDS/ΜL (ref 1.85–7.62)
NEUTS SEG NFR BLD AUTO: 60 % (ref 43–75)
NRBC BLD AUTO-RTO: 1 /100 WBCS
PLATELET # BLD AUTO: 369 THOUSANDS/UL (ref 149–390)
PMV BLD AUTO: 10.9 FL (ref 8.9–12.7)
POTASSIUM SERPL-SCNC: 3.7 MMOL/L (ref 3.5–5.3)
PROT SERPL-MCNC: 7.5 G/DL (ref 6.4–8.2)
RBC # BLD AUTO: 3 MILLION/UL (ref 3.81–5.12)
SODIUM SERPL-SCNC: 139 MMOL/L (ref 136–145)
WBC # BLD AUTO: 8.67 THOUSAND/UL (ref 4.31–10.16)

## 2019-10-14 PROCEDURE — 80053 COMPREHEN METABOLIC PANEL: CPT

## 2019-10-14 PROCEDURE — 36415 COLL VENOUS BLD VENIPUNCTURE: CPT

## 2019-10-14 PROCEDURE — 85025 COMPLETE CBC W/AUTO DIFF WBC: CPT

## 2019-10-16 ENCOUNTER — OFFICE VISIT (OUTPATIENT)
Dept: HEMATOLOGY ONCOLOGY | Facility: CLINIC | Age: 24
End: 2019-10-16
Payer: COMMERCIAL

## 2019-10-16 VITALS
TEMPERATURE: 98.4 F | HEART RATE: 110 BPM | HEIGHT: 65 IN | WEIGHT: 265 LBS | BODY MASS INDEX: 44.15 KG/M2 | RESPIRATION RATE: 16 BRPM | DIASTOLIC BLOOD PRESSURE: 70 MMHG | SYSTOLIC BLOOD PRESSURE: 132 MMHG | OXYGEN SATURATION: 98 %

## 2019-10-16 DIAGNOSIS — D69.6 THROMBOCYTOPENIA (HCC): Primary | ICD-10-CM

## 2019-10-16 DIAGNOSIS — D59.8 OTHER ACQUIRED HEMOLYTIC ANEMIAS (HCC): ICD-10-CM

## 2019-10-16 PROBLEM — D58.9 HEMOLYTIC ANEMIA (HCC): Status: ACTIVE | Noted: 2019-10-16

## 2019-10-16 PROCEDURE — 99214 OFFICE O/P EST MOD 30 MIN: CPT | Performed by: PHYSICIAN ASSISTANT

## 2019-10-16 NOTE — LETTER
October 16, 2019     Patient: Nohemy Edouard   YOB: 1995   Date of Visit: 10/16/2019       To Whom it May Concern:    Nohemy Edouard is under my professional care  She was seen in my office on 10/16/2019  She may return to work on 10/17/19  If you have any questions or concerns, please don't hesitate to call      Sincerely,        Maggie Trujillo PA-C        CC: No Recipients

## 2019-10-16 NOTE — PROGRESS NOTES
Hematology/Oncology Outpatient Follow-up  Marco Peerra 30 y o  female 1995 [id number]    Date:  10/16/2019      Assessment and Plan:    1  Thrombocytopenia ([address] ), 2  Other acquired hemolytic anemias [facility])  53-year-old female presents for follow-up regarding recent history of acute ITP  She has been receiving treatment with steroids as well as IVIG  Her last IVIG was on 10/01/2019  After receiving IVIG she subsequently developed slight hemolytic anemia, Carlos Alberto negative  She remains on prednisone at this time  She is on 25 mg daily  She does take this with food  She is having CBC twice weekly, Monday and Thursday  CBC this week showed improvement of hemoglobin  There was decrease in platelets however still very sufficient  She is permitted to return to work  She will have repeat CBC tomorrow  Will call to review and provide further instructions  At this time she is to continue prednisone 25 mg daily  Advised she should have her flu shot  Follow up in 4-6 weeks with Dr Hector Mcnamara  HPI:  53-year-old female presents for follow-up regarding history of ITP  She presented to her PCP on 09/03/2019 due to a rash  This was found to be petechiae  Outpatient labs showed a platelet count of 5265  When she presented to the emergency department platelet count was 0  She was given platelet transfusion as well as 1 g of IV Solu-Medrol  Platelet count 83041 after Solu-Medrol  Prior to diagnosis she had a recent URI and was prescribed a Z-Peewee     1 g of IV Solu-Medrol was continued daily for 3 to doses  She was discharged home from the hospital with 100 mg prednisone daily  Repeat evaluation of platelets outpatient was 34,000, decreased from 59,000 inpatient  Due to decrease she was given IVIG  She received IVIG on 9/12/19, 9/17/19, 10/1/19  Hepatitis panel, acute, on 9/3/19 was negative  Chronic hepatitis panel on 9/27/19 was negative except for reactive hepatitis core total antibody  Therefore hepatitis-B surface antibody was ordered  This showed protective immunity  Therefore if needed patient would be candidate for Rituxan  She then had another hospitalization on 09/18-9/21  This was secondary to acute right upper abdominal pain and she was diagnosed with acute cholecystitis  She underwent laparoscopic cholecystectomy on 09/19/2019     1 patient has received IVIG she has developed subsequent hemolytic anemia, Carlos Alberto negative  Interval history: She continues on prednisone 25 mg  She is feeling well  She is complaining of facial fullness  Delayed resolution of previous bruises  ROS: Review of Systems   Constitutional: Negative for appetite change, chills, fever and unexpected weight change  Respiratory: Negative for cough and shortness of breath  Cardiovascular: Negative for chest pain, palpitations and leg swelling  Gastrointestinal: Negative for abdominal pain, blood in stool, constipation, diarrhea, nausea and vomiting  Denies GERD symptoms    Genitourinary: Negative for difficulty urinating, dysuria and hematuria  Musculoskeletal: Negative for arthralgias  Skin: Negative  Neurological: Negative for dizziness, weakness, light-headedness, numbness and headaches  Hematological: Negative  Psychiatric/Behavioral: Negative          Past Medical History:   Diagnosis Date    Anxiety        Past Surgical History:   Procedure Laterality Date    CHOLECYSTECTOMY LAPAROSCOPIC N/A 9/19/2019    Procedure: CHOLECYSTECTOMY LAPAROSCOPIC;  Surgeon: Irina Palafox MD;  Location: [facility] OR;  Service: General    WISDOM TOOTH EXTRACTION         Social History     Socioeconomic History    Marital status: Single     Spouse name: Not on file    Number of children: Not on file    Years of education: Not on file    Highest education level: Not on file   Occupational History    Not on file   Social Needs    Financial resource strain: Not on file    Food insecurity: Worry: Not on file     Inability: Not on file    Transportation needs:     Medical: Not on file     Non-medical: Not on file   Tobacco Use    Smoking status: Never Smoker    Smokeless tobacco: Never Used   Substance and Sexual Activity    Alcohol use: Yes     Comment: rare    Drug use: Never    Sexual activity: Not on file   Lifestyle    Physical activity:     Days per week: Not on file     Minutes per session: Not on file    Stress: Not on file   Relationships    Social connections:     Talks on phone: Not on file     Gets together: Not on file     Attends [employer] service: Not on file     Active member of club or organization: Not on file     Attends meetings of clubs or organizations: Not on file     Relationship status: Not on file    Intimate partner violence:     Fear of current or ex partner: Not on file     Emotionally abused: Not on file     Physically abused: Not on file     Forced sexual activity: Not on file   Other Topics Concern    Not on file   Social History Narrative    Not on file       Family History   Problem Relation Age of Onset    Hypertension Father     Anxiety disorder Maternal Grandmother     Diabetes Paternal Aunt     Diabetes Paternal Uncle        Allergies   Allergen Reactions    Pollen Extract          Current Outpatient Medications:     ALPRAZolam (XANAX) 0 25 mg tablet, Take 1 tablet (0 25 mg total) by mouth daily as needed for anxiety, Disp: 10 tablet, Rfl: 0    cetirizine (ZyrTEC) 10 mg tablet, Take 10 mg by mouth as needed , Disp: , Rfl:     folic acid (FOLVITE) 1 mg tablet, Take 1 tablet (1 mg total) by mouth daily, Disp: 30 tablet, Rfl: 2    Multiple Vitamin (MULTIVITAMIN) tablet, Take 1 tablet by mouth daily, Disp: , Rfl:     predniSONE 50 mg tablet, Take 1 tablet (50 mg total) by mouth daily (Patient taking differently: Take 25 mg by mouth daily ), Disp: 30 tablet, Rfl: 0      Physical Exam:  /70 (BP Location: Left arm)   Pulse (!) 110   Temp 98 4 °F (36 9 °C)   Resp 16   Ht 5' 5" (1 651 m)   Wt 120 kg (265 lb)   SpO2 98%   BMI 44 10 kg/m²     Physical Exam   Constitutional: She is oriented to person, place, and time  She appears well-developed and well-nourished  No distress  Obese     HENT:   Head: Normocephalic and atraumatic  Eyes: Conjunctivae are normal  No scleral icterus  Neck: Normal range of motion  Neck supple  Cardiovascular: Normal rate, regular rhythm and normal heart sounds  No murmur heard  Pulmonary/Chest: Effort normal and breath sounds normal  No respiratory distress  Abdominal: Soft  There is no tenderness  Musculoskeletal: Normal range of motion  She exhibits no edema or tenderness  Lymphadenopathy:     She has no cervical adenopathy  Neurological: She is alert and oriented to person, place, and time  No cranial nerve deficit  Skin: Skin is warm and dry  Psychiatric: She has a normal mood and affect  Labs:  Lab Results   Component Value Date    WBC 8 67 10/14/2019    HGB 9 6 (L) 10/14/2019    HCT 30 7 (L) 10/14/2019     (H) 10/14/2019     10/14/2019     Lab Results   Component Value Date    K 3 7 10/14/2019     10/14/2019    CO2 26 10/14/2019    BUN 13 10/14/2019    CREATININE 0 71 10/14/2019    GLUF 64 (L) 10/14/2019    CALCIUM 9 1 10/14/2019    AST 17 10/14/2019    ALT 50 10/14/2019    ALKPHOS 49 10/14/2019    EGFR 118 10/14/2019       Patient voiced understanding and agreement in the above discussion  Aware to contact our office with questions/symptoms in the interim  This note has been generated by voice recognition software system  Therefore, there may be spelling, grammar, and or syntax errors  Please contact if questions arise

## 2019-10-17 ENCOUNTER — APPOINTMENT (OUTPATIENT)
Dept: LAB | Age: 24
End: 2019-10-17
Payer: COMMERCIAL

## 2019-10-17 ENCOUNTER — TELEPHONE (OUTPATIENT)
Dept: HEMATOLOGY ONCOLOGY | Facility: CLINIC | Age: 24
End: 2019-10-17

## 2019-10-17 NOTE — TELEPHONE ENCOUNTER
Patient called requesting the results to her recent blood work completed today 10/17 ordered by Radha To PA-C  Best call back [phone]

## 2019-10-17 NOTE — TELEPHONE ENCOUNTER
Task received from [facility] from patient asking about her lab results from yesterday  Results reviewed with patient and she is aware  Has follow up with Dr Marielos Champion on 11/1, call back if needed otherwise she is aware of her results

## 2019-10-18 ENCOUNTER — TELEPHONE (OUTPATIENT)
Dept: HEMATOLOGY ONCOLOGY | Facility: HOSPITAL | Age: 24
End: 2019-10-18

## 2019-10-18 NOTE — TELEPHONE ENCOUNTER
Please call patient and let her know hgb and plt count from yesterday and to continue same dose prednisone  No IVIG needed at this time  If labs are good Monday, we will decrease prednisone more

## 2019-10-21 ENCOUNTER — APPOINTMENT (OUTPATIENT)
Dept: LAB | Age: 24
End: 2019-10-21
Payer: COMMERCIAL

## 2019-10-21 DIAGNOSIS — R23.3 SPONTANEOUS ECCHYMOSES: ICD-10-CM

## 2019-10-21 LAB
ALBUMIN SERPL BCP-MCNC: 3.3 G/DL (ref 3.5–5)
ALP SERPL-CCNC: 45 U/L (ref 46–116)
ALT SERPL W P-5'-P-CCNC: 38 U/L (ref 12–78)
ANION GAP SERPL CALCULATED.3IONS-SCNC: 7 MMOL/L (ref 4–13)
AST SERPL W P-5'-P-CCNC: 15 U/L (ref 5–45)
BASOPHILS # BLD AUTO: 0.01 THOUSANDS/ΜL (ref 0–0.1)
BASOPHILS NFR BLD AUTO: 0 % (ref 0–1)
BILIRUB SERPL-MCNC: 0.37 MG/DL (ref 0.2–1)
BUN SERPL-MCNC: 14 MG/DL (ref 5–25)
CALCIUM SERPL-MCNC: 9 MG/DL (ref 8.3–10.1)
CHLORIDE SERPL-SCNC: 108 MMOL/L (ref 100–108)
CO2 SERPL-SCNC: 24 MMOL/L (ref 21–32)
CREAT SERPL-MCNC: 0.78 MG/DL (ref 0.6–1.3)
EOSINOPHIL # BLD AUTO: 0.03 THOUSAND/ΜL (ref 0–0.61)
EOSINOPHIL NFR BLD AUTO: 1 % (ref 0–6)
ERYTHROCYTE [DISTWIDTH] IN BLOOD BY AUTOMATED COUNT: 18.4 % (ref 11.6–15.1)
GFR SERPL CREATININE-BSD FRML MDRD: 105 ML/MIN/1.73SQ M
GLUCOSE P FAST SERPL-MCNC: 75 MG/DL (ref 65–99)
HCT VFR BLD AUTO: 34.5 % (ref 34.8–46.1)
HGB BLD-MCNC: 10.8 G/DL (ref 11.5–15.4)
IMM GRANULOCYTES # BLD AUTO: 0.03 THOUSAND/UL (ref 0–0.2)
IMM GRANULOCYTES NFR BLD AUTO: 1 % (ref 0–2)
LYMPHOCYTES # BLD AUTO: 2.08 THOUSANDS/ΜL (ref 0.6–4.47)
LYMPHOCYTES NFR BLD AUTO: 37 % (ref 14–44)
MCH RBC QN AUTO: 31.8 PG (ref 26.8–34.3)
MCHC RBC AUTO-ENTMCNC: 31.3 G/DL (ref 31.4–37.4)
MCV RBC AUTO: 102 FL (ref 82–98)
MONOCYTES # BLD AUTO: 0.49 THOUSAND/ΜL (ref 0.17–1.22)
MONOCYTES NFR BLD AUTO: 9 % (ref 4–12)
NEUTROPHILS # BLD AUTO: 2.99 THOUSANDS/ΜL (ref 1.85–7.62)
NEUTS SEG NFR BLD AUTO: 52 % (ref 43–75)
NRBC BLD AUTO-RTO: 0 /100 WBCS
PLATELET # BLD AUTO: 271 THOUSANDS/UL (ref 149–390)
PMV BLD AUTO: 11.3 FL (ref 8.9–12.7)
POTASSIUM SERPL-SCNC: 3.5 MMOL/L (ref 3.5–5.3)
PROT SERPL-MCNC: 7.2 G/DL (ref 6.4–8.2)
RBC # BLD AUTO: 3.4 MILLION/UL (ref 3.81–5.12)
SODIUM SERPL-SCNC: 139 MMOL/L (ref 136–145)
WBC # BLD AUTO: 5.63 THOUSAND/UL (ref 4.31–10.16)

## 2019-10-21 PROCEDURE — 80053 COMPREHEN METABOLIC PANEL: CPT

## 2019-10-21 PROCEDURE — 36415 COLL VENOUS BLD VENIPUNCTURE: CPT

## 2019-10-21 PROCEDURE — 85025 COMPLETE CBC W/AUTO DIFF WBC: CPT

## 2019-10-22 ENCOUNTER — TELEPHONE (OUTPATIENT)
Dept: HEMATOLOGY ONCOLOGY | Facility: CLINIC | Age: 24
End: 2019-10-22

## 2019-10-22 NOTE — TELEPHONE ENCOUNTER
Call transferred from [address] [place] [address]  Patient advised of 10/21/19 CBC and CMP results  Patient is currently on Prednisone 25 mg daily  Please call patient with further Prednisone instructions based on 10/21/19 lab results    Patient's call back # [phone]

## 2019-10-23 DIAGNOSIS — D69.3 ACUTE ITP (HCC): Primary | ICD-10-CM

## 2019-10-23 DIAGNOSIS — D69.3 ACUTE ITP (HCC): ICD-10-CM

## 2019-10-23 RX ORDER — PREDNISONE 20 MG/1
TABLET ORAL
Qty: 45 TABLET | Refills: 0 | Status: SHIPPED | OUTPATIENT
Start: 2019-10-23 | End: 2020-01-15

## 2019-10-23 NOTE — TELEPHONE ENCOUNTER
Patient stated she wants a new script because she only has 50 mg tablets  Please send 20 mg tablets so she can cut them in half for the days she takes 10 mg  Please review with Dr Jaymie Chen

## 2019-10-23 NOTE — TELEPHONE ENCOUNTER
Per Dr Annabel Islas, patient is to take 10 mg alternating with 20 mg daily of prednisone  Called and LVM for patient with this information  Patient was instructed to call the office if she has any additional questions

## 2019-10-23 NOTE — TELEPHONE ENCOUNTER
Script was sent over to pharmacy on file  You can access the [facility], offered by [facility], by registering with the following website: [email or web address]

## 2019-10-23 NOTE — TELEPHONE ENCOUNTER
Patient called stating that she received the VM but has some additional questions because the dosage of her medication will require her to cut the tablets and she do think its possible  Best call back [phone]

## 2019-10-28 ENCOUNTER — TRANSCRIBE ORDERS (OUTPATIENT)
Dept: LAB | Age: 24
End: 2019-10-28

## 2019-10-28 ENCOUNTER — APPOINTMENT (OUTPATIENT)
Dept: LAB | Age: 24
End: 2019-10-28
Payer: COMMERCIAL

## 2019-10-28 ENCOUNTER — TELEPHONE (OUTPATIENT)
Dept: HEMATOLOGY ONCOLOGY | Facility: CLINIC | Age: 24
End: 2019-10-28

## 2019-10-28 DIAGNOSIS — D69.3 IMMUNE THROMBOCYTOPENIC PURPURA (HCC): ICD-10-CM

## 2019-10-28 DIAGNOSIS — D69.3 ACUTE ITP (HCC): Primary | ICD-10-CM

## 2019-10-28 DIAGNOSIS — R23.3 SPONTANEOUS ECCHYMOSES: Primary | ICD-10-CM

## 2019-10-28 DIAGNOSIS — R23.3 SPONTANEOUS ECCHYMOSES: ICD-10-CM

## 2019-10-28 LAB
ALBUMIN SERPL BCP-MCNC: 3.5 G/DL (ref 3.5–5)
ALP SERPL-CCNC: 46 U/L (ref 46–116)
ALT SERPL W P-5'-P-CCNC: 30 U/L (ref 12–78)
ANION GAP SERPL CALCULATED.3IONS-SCNC: 7 MMOL/L (ref 4–13)
AST SERPL W P-5'-P-CCNC: 13 U/L (ref 5–45)
BASOPHILS # BLD AUTO: 0.03 THOUSANDS/ΜL (ref 0–0.1)
BASOPHILS NFR BLD AUTO: 1 % (ref 0–1)
BILIRUB SERPL-MCNC: 0.28 MG/DL (ref 0.2–1)
BUN SERPL-MCNC: 15 MG/DL (ref 5–25)
CALCIUM SERPL-MCNC: 9 MG/DL (ref 8.3–10.1)
CHLORIDE SERPL-SCNC: 108 MMOL/L (ref 100–108)
CO2 SERPL-SCNC: 24 MMOL/L (ref 21–32)
CREAT SERPL-MCNC: 0.67 MG/DL (ref 0.6–1.3)
EOSINOPHIL # BLD AUTO: 0.04 THOUSAND/ΜL (ref 0–0.61)
EOSINOPHIL NFR BLD AUTO: 1 % (ref 0–6)
ERYTHROCYTE [DISTWIDTH] IN BLOOD BY AUTOMATED COUNT: 15.9 % (ref 11.6–15.1)
GFR SERPL CREATININE-BSD FRML MDRD: 122 ML/MIN/1.73SQ M
GLUCOSE P FAST SERPL-MCNC: 77 MG/DL (ref 65–99)
HCT VFR BLD AUTO: 38.6 % (ref 34.8–46.1)
HGB BLD-MCNC: 12 G/DL (ref 11.5–15.4)
IMM GRANULOCYTES # BLD AUTO: 0.03 THOUSAND/UL (ref 0–0.2)
IMM GRANULOCYTES NFR BLD AUTO: 1 % (ref 0–2)
LYMPHOCYTES # BLD AUTO: 1.79 THOUSANDS/ΜL (ref 0.6–4.47)
LYMPHOCYTES NFR BLD AUTO: 31 % (ref 14–44)
MCH RBC QN AUTO: 31.2 PG (ref 26.8–34.3)
MCHC RBC AUTO-ENTMCNC: 31.1 G/DL (ref 31.4–37.4)
MCV RBC AUTO: 100 FL (ref 82–98)
MONOCYTES # BLD AUTO: 0.53 THOUSAND/ΜL (ref 0.17–1.22)
MONOCYTES NFR BLD AUTO: 9 % (ref 4–12)
NEUTROPHILS # BLD AUTO: 3.36 THOUSANDS/ΜL (ref 1.85–7.62)
NEUTS SEG NFR BLD AUTO: 57 % (ref 43–75)
NRBC BLD AUTO-RTO: 0 /100 WBCS
PLATELET # BLD AUTO: 173 THOUSANDS/UL (ref 149–390)
PMV BLD AUTO: 11.7 FL (ref 8.9–12.7)
POTASSIUM SERPL-SCNC: 3.7 MMOL/L (ref 3.5–5.3)
PROT SERPL-MCNC: 7.1 G/DL (ref 6.4–8.2)
RBC # BLD AUTO: 3.85 MILLION/UL (ref 3.81–5.12)
SODIUM SERPL-SCNC: 139 MMOL/L (ref 136–145)
WBC # BLD AUTO: 5.78 THOUSAND/UL (ref 4.31–10.16)

## 2019-10-28 PROCEDURE — 85025 COMPLETE CBC W/AUTO DIFF WBC: CPT

## 2019-10-28 PROCEDURE — 36415 COLL VENOUS BLD VENIPUNCTURE: CPT

## 2019-10-28 PROCEDURE — 80053 COMPREHEN METABOLIC PANEL: CPT

## 2019-10-28 NOTE — TELEPHONE ENCOUNTER
Called and left voicemail for patient reviewing based on labs she should continue with current Prednisone regime 10mg alternating with 20 mg for platelets  Reviewed to continue weekly labs

## 2019-10-28 NOTE — TELEPHONE ENCOUNTER
Patient patient returned my call  Advised of CMP and CBC results  Patient is currently on Prednisone 10 mg alternating with 20 mg dose  Patient is not having any active signs of bleeding other than he menses that started today  Reports normal flow  Patients platelet level 653 on today's labs  Was 271 on 10/21/19 labs  Standing orders for CBC with diff and CMP placed  Call back # [phone]

## 2019-10-31 ENCOUNTER — APPOINTMENT (OUTPATIENT)
Dept: LAB | Age: 24
End: 2019-10-31
Payer: COMMERCIAL

## 2019-10-31 ENCOUNTER — TELEPHONE (OUTPATIENT)
Dept: HEMATOLOGY ONCOLOGY | Facility: CLINIC | Age: 24
End: 2019-10-31

## 2019-10-31 LAB
ALBUMIN SERPL BCP-MCNC: 3.7 G/DL (ref 3.5–5)
ALP SERPL-CCNC: 48 U/L (ref 46–116)
ALT SERPL W P-5'-P-CCNC: 25 U/L (ref 12–78)
ANION GAP SERPL CALCULATED.3IONS-SCNC: 8 MMOL/L (ref 4–13)
AST SERPL W P-5'-P-CCNC: 11 U/L (ref 5–45)
BASOPHILS # BLD AUTO: 0.03 THOUSANDS/ΜL (ref 0–0.1)
BASOPHILS NFR BLD AUTO: 1 % (ref 0–1)
BILIRUB SERPL-MCNC: 0.29 MG/DL (ref 0.2–1)
BUN SERPL-MCNC: 15 MG/DL (ref 5–25)
CALCIUM SERPL-MCNC: 9 MG/DL (ref 8.3–10.1)
CHLORIDE SERPL-SCNC: 106 MMOL/L (ref 100–108)
CO2 SERPL-SCNC: 28 MMOL/L (ref 21–32)
CREAT SERPL-MCNC: 0.78 MG/DL (ref 0.6–1.3)
EOSINOPHIL # BLD AUTO: 0.05 THOUSAND/ΜL (ref 0–0.61)
EOSINOPHIL NFR BLD AUTO: 1 % (ref 0–6)
ERYTHROCYTE [DISTWIDTH] IN BLOOD BY AUTOMATED COUNT: 15.5 % (ref 11.6–15.1)
GFR SERPL CREATININE-BSD FRML MDRD: 105 ML/MIN/1.73SQ M
GLUCOSE P FAST SERPL-MCNC: 74 MG/DL (ref 65–99)
HCT VFR BLD AUTO: 36.8 % (ref 34.8–46.1)
HGB BLD-MCNC: 11.7 G/DL (ref 11.5–15.4)
IMM GRANULOCYTES # BLD AUTO: 0.04 THOUSAND/UL (ref 0–0.2)
IMM GRANULOCYTES NFR BLD AUTO: 1 % (ref 0–2)
LYMPHOCYTES # BLD AUTO: 1.9 THOUSANDS/ΜL (ref 0.6–4.47)
LYMPHOCYTES NFR BLD AUTO: 35 % (ref 14–44)
MCH RBC QN AUTO: 31.7 PG (ref 26.8–34.3)
MCHC RBC AUTO-ENTMCNC: 31.8 G/DL (ref 31.4–37.4)
MCV RBC AUTO: 100 FL (ref 82–98)
MONOCYTES # BLD AUTO: 0.46 THOUSAND/ΜL (ref 0.17–1.22)
MONOCYTES NFR BLD AUTO: 8 % (ref 4–12)
NEUTROPHILS # BLD AUTO: 3.03 THOUSANDS/ΜL (ref 1.85–7.62)
NEUTS SEG NFR BLD AUTO: 54 % (ref 43–75)
NRBC BLD AUTO-RTO: 0 /100 WBCS
PLATELET # BLD AUTO: 159 THOUSANDS/UL (ref 149–390)
PMV BLD AUTO: 11.9 FL (ref 8.9–12.7)
POTASSIUM SERPL-SCNC: 3.8 MMOL/L (ref 3.5–5.3)
PROT SERPL-MCNC: 7.1 G/DL (ref 6.4–8.2)
RBC # BLD AUTO: 3.69 MILLION/UL (ref 3.81–5.12)
SODIUM SERPL-SCNC: 142 MMOL/L (ref 136–145)
WBC # BLD AUTO: 5.51 THOUSAND/UL (ref 4.31–10.16)

## 2019-10-31 PROCEDURE — 85025 COMPLETE CBC W/AUTO DIFF WBC: CPT

## 2019-10-31 PROCEDURE — 36415 COLL VENOUS BLD VENIPUNCTURE: CPT

## 2019-10-31 PROCEDURE — 80053 COMPREHEN METABOLIC PANEL: CPT

## 2019-10-31 RX ORDER — DIPHENHYDRAMINE HCL 25 MG
50 TABLET ORAL ONCE
Status: CANCELLED | OUTPATIENT
Start: 2019-11-07

## 2019-10-31 RX ORDER — ACETAMINOPHEN 325 MG/1
650 TABLET ORAL ONCE
Status: CANCELLED | OUTPATIENT
Start: 2019-11-07

## 2019-10-31 RX ORDER — SODIUM CHLORIDE 9 MG/ML
20 INJECTION, SOLUTION INTRAVENOUS ONCE
Status: CANCELLED | OUTPATIENT
Start: 2019-11-07

## 2019-10-31 NOTE — TELEPHONE ENCOUNTER
Dr Jaymie Chen requested patient be called with platelets of 491 and review he would like patient set up for IVIG (1000mg/kg) next week at [facility] infusion  Called [facility] and spoke with Petty Braga and scheduled patient for IVIG 1x on Thursday November 7th @ 10:30am (orders placed)    Called patient and reviewed platelet level  Reviewed per Dr Jaymie Chen patient should continue on same dose of prednisone  Reviewed IVIG infusion and patient was agreeable  Additionally we see patient in office tomorrow

## 2019-11-01 ENCOUNTER — OFFICE VISIT (OUTPATIENT)
Dept: HEMATOLOGY ONCOLOGY | Facility: CLINIC | Age: 24
End: 2019-11-01
Payer: COMMERCIAL

## 2019-11-01 ENCOUNTER — TELEPHONE (OUTPATIENT)
Dept: HEMATOLOGY ONCOLOGY | Facility: CLINIC | Age: 24
End: 2019-11-01

## 2019-11-01 VITALS
WEIGHT: 270 LBS | HEART RATE: 114 BPM | RESPIRATION RATE: 16 BRPM | TEMPERATURE: 98.3 F | BODY MASS INDEX: 43.39 KG/M2 | DIASTOLIC BLOOD PRESSURE: 84 MMHG | OXYGEN SATURATION: 99 % | HEIGHT: 66 IN | SYSTOLIC BLOOD PRESSURE: 128 MMHG

## 2019-11-01 DIAGNOSIS — D69.3 ACUTE ITP (HCC): Primary | ICD-10-CM

## 2019-11-01 DIAGNOSIS — D59.8 OTHER ACQUIRED HEMOLYTIC ANEMIAS (HCC): ICD-10-CM

## 2019-11-01 PROCEDURE — 99214 OFFICE O/P EST MOD 30 MIN: CPT | Performed by: INTERNAL MEDICINE

## 2019-11-01 NOTE — PROGRESS NOTES
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 Meghan Serum    I discussed with our liver specialist Dr Colon about the safety of using Rituxan  He felt that it was safe to use Rituxan  She has not received Rituxan  She received 1 dose of Nplate and that was not continued because approval is for chronic adult ITP  Patient was made aware of that     Patient knows to avoid aspirin, NSAIDs, other blood thinners and trauma  For active bleeding or other medical emergencies she will be taken to the emergency room              Current Outpatient Medications:     ALPRAZolam (XANAX) 0 25 mg tablet, Take 1 tablet (0 25 mg total) by mouth daily as needed for anxiety, Disp: 10 tablet, Rfl: 0    cetirizine (ZyrTEC) 10 mg tablet, Take 10 mg by mouth as needed , Disp: , Rfl:     folic acid (FOLVITE) 1 mg tablet, Take 1 tablet (1 mg total) by mouth daily, Disp: 30 tablet, Rfl: 2    Multiple Vitamin (MULTIVITAMIN) tablet, Take 1 tablet by mouth daily, Disp: , Rfl:     predniSONE 20 mg tablet, 1 tablet alternating with half tablet every other day with food  , Disp: 45 tablet, Rfl: 0    Allergies   Allergen Reactions    Pollen Extract         No history exists  ROS:  11/01/19 Reviewed 13 systems:  Presently no neurological, cardiac, pulmonary, GI and  symptoms  No other symptoms like  fever, chills, bleeding, bone pains, skin rash, weight loss, arthritic symptoms, weakness, numbness,  claudication and gait problem  No frequent infections  Not unusually sensitive to heat or cold  No swelling of the ankles  No swollen glands  Patient is anxious  Other symptoms are in HPI        /84 (BP Location: Left arm, Patient Position: Sitting, Cuff Size: Adult)   Pulse (!) 114   Temp 98 3 °F (36 8 °C)   Resp 16   Ht 5' 5 5" (1 664 m)   Wt 122 kg (270 lb)   SpO2 99%   BMI 44 25 kg/m²     Physical Exam:  Alert, oriented, not in distress, no icterus, no oral thrush, no palpable neck mass, clear lung fields, regular heart rate, abdomen  soft and non tender, no palpable abdominal mass, no ascites, no edema of ankles, no calf tenderness, no focal neurological deficit, no skin rash, no palpable lymphadenopathy in the neck and axillary areas, good arterial pulses, no clubbing  Patient is anxious  Performance status 1      IMAGING:  No orders to display       LABS:  Results for orders placed or performed in visit on 10/28/19   CBC and differential   Result Value Ref Range    WBC 5 51 4 31 - 10 16 Thousand/uL    RBC 3 69 (L) 3 81 - 5 12 Million/uL    Hemoglobin 11 7 11 5 - 15 4 g/dL    Hematocrit 36 8 34 8 - 46 1 %     (H) 82 - 98 fL    MCH 31 7 26 8 - 34 3 pg    MCHC 31 8 31 4 - 37 4 g/dL    RDW 15 5 (H) 11 6 - 15 1 %    MPV 11 9 8 9 - 12 7 fL    Platelets 117 818 - 794 Thousands/uL    nRBC 0 /100 WBCs    Neutrophils Relative 54 43 - 75 %    Immat GRANS % 1 0 - 2 %    Lymphocytes Relative 35 14 - 44 %    Monocytes Relative 8 4 - 12 %    Eosinophils Relative 1 0 - 6 %    Basophils Relative 1 0 - 1 %    Neutrophils Absolute 3 03 1 85 - 7 62 Thousands/µL    Immature Grans Absolute 0 04 0 00 - 0 20 Thousand/uL    Lymphocytes Absolute 1 90 0 60 - 4 47 Thousands/µL    Monocytes Absolute 0 46 0 17 - 1 22 Thousand/µL    Eosinophils Absolute 0 05 0 00 - 0 61 Thousand/µL    Basophils Absolute 0 03 0 00 - 0 10 Thousands/µL   Comprehensive metabolic panel   Result Value Ref Range    Sodium 142 136 - 145 mmol/L    Potassium 3 8 3 5 - 5 3 mmol/L    Chloride 106 100 - 108 mmol/L    CO2 28 21 - 32 mmol/L    ANION GAP 8 4 - 13 mmol/L    BUN 15 5 - 25 mg/dL    Creatinine 0 78 0 60 - 1 30 mg/dL    Glucose, Fasting 74 65 - 99 mg/dL    Calcium 9 0 8 3 - 10 1 mg/dL    AST 11 5 - 45 U/L    ALT 25 12 - 78 U/L    Alkaline Phosphatase 48 46 - 116 U/L    Total Protein 7 1 6 4 - 8 2 g/dL    Albumin 3 7 3 5 - 5 0 g/dL    Total Bilirubin 0 29 0 20 - 1 00 mg/dL    eGFR 105 ml/min/1 73sq m     Labs, Imaging, & Other studies:   All pertinent labs and imaging studies were personally reviewed      Reviewed and discussed with patient  Assessment and plan: Follow-up visit for acute ITP and platelet count was 0 when we 1st saw her in the 1st week of September 2019  She had partial early response to steroids but then she stopped responding to steroids  but she responded to IVIG therapy  She developed 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 Christian Waters    I discussed with our liver specialist Dr Colon about the safety of using Rituxan  He felt that it was safe to use Rituxan  She has not received Rituxan    She received 1 dose of Nplate and that was not continued because approval is for chronic adult ITP  Patient was made aware of that     Physical examination and test results are as recorded and discussed  Prednisone dose will be tapered and to decide about IVIG therapy after checking blood counts on Monday, 11/04/2019  Patient knows to avoid aspirin, NSAIDs, other blood thinners and trauma  For active bleeding or other medical emergencies she will be taken to the emergency room     All discussed in detail  Questions answered  Patient participated in discussion  Also discussed the importance of eating healthy foods, staying active and health screening tests  Patient is capable of self-care  Patient will try to lose weight     1  Acute ITP ([facility] )      2  Other acquired hemolytic anemias ([facility] )      3  BMI 40 0-44 9, adult [facility])              Patient voiced understanding and agreement in the discussion  Counseling / Coordination of Care   Greater than 50% of total time was spent with the patient and / or family counseling and / or coordination of care

## 2019-11-04 ENCOUNTER — APPOINTMENT (OUTPATIENT)
Dept: LAB | Age: 24
End: 2019-11-04
Payer: COMMERCIAL

## 2019-11-04 ENCOUNTER — TELEPHONE (OUTPATIENT)
Dept: HEMATOLOGY ONCOLOGY | Facility: CLINIC | Age: 24
End: 2019-11-04

## 2019-11-04 DIAGNOSIS — D69.3 ACUTE ITP (HCC): ICD-10-CM

## 2019-11-04 NOTE — PROGRESS NOTES
D/c'd patient IVIG for 11 7 due to platelets 491 per Dr Chen How  Dr Chen How called patient to review no need for IVIG/results   (Cancelled with BENITO Parham)

## 2019-11-04 NOTE — TELEPHONE ENCOUNTER
Platelet count 287001  Hemoglobin 11 9  I left message for the patient on her voicemail with platelet count  and cancelling IVIG therapy this week  She has my cell number to call me back

## 2019-11-07 ENCOUNTER — HOSPITAL ENCOUNTER (OUTPATIENT)
Dept: INFUSION CENTER | Facility: CLINIC | Age: 24
End: 2019-11-07

## 2019-11-11 ENCOUNTER — TELEPHONE (OUTPATIENT)
Dept: HEMATOLOGY ONCOLOGY | Facility: CLINIC | Age: 24
End: 2019-11-11

## 2019-11-11 ENCOUNTER — APPOINTMENT (OUTPATIENT)
Dept: LAB | Age: 24
End: 2019-11-11
Payer: COMMERCIAL

## 2019-11-11 NOTE — TELEPHONE ENCOUNTER
I discussed blood count results and lowered prednisone dose from 10 mg a day to 10 mg alternating with 5 mg every other day  Counts will be checked in 1 week

## 2019-11-18 ENCOUNTER — APPOINTMENT (OUTPATIENT)
Dept: LAB | Age: 24
End: 2019-11-18
Payer: COMMERCIAL

## 2019-11-25 ENCOUNTER — APPOINTMENT (OUTPATIENT)
Dept: LAB | Age: 24
End: 2019-11-25
Payer: COMMERCIAL

## 2019-12-04 ENCOUNTER — APPOINTMENT (OUTPATIENT)
Dept: LAB | Age: 24
End: 2019-12-04
Payer: COMMERCIAL

## 2019-12-18 ENCOUNTER — TELEPHONE (OUTPATIENT)
Dept: HEMATOLOGY ONCOLOGY | Facility: CLINIC | Age: 24
End: 2019-12-18

## 2019-12-18 ENCOUNTER — APPOINTMENT (OUTPATIENT)
Dept: LAB | Age: 24
End: 2019-12-18
Payer: COMMERCIAL

## 2019-12-18 NOTE — TELEPHONE ENCOUNTER
Platelet count [id number]  Patient is going to decrease prednisone dose to 5 mg every Monday and Thursday from 3  days a week  Blood counts every 2 weeks

## 2020-01-02 ENCOUNTER — APPOINTMENT (OUTPATIENT)
Dept: LAB | Age: 25
End: 2020-01-02
Payer: COMMERCIAL

## 2020-01-04 ENCOUNTER — DOCUMENTATION (OUTPATIENT)
Dept: HEMATOLOGY ONCOLOGY | Facility: CLINIC | Age: 25
End: 2020-01-04

## 2020-01-04 NOTE — PROGRESS NOTES
Platelet count [id number]  Patient will decrease prednisone dose to 5 milligram once a week from twice a week and get  blood counts checked in 1 month

## 2020-01-14 PROBLEM — Z01.419 WOMEN'S ANNUAL ROUTINE GYNECOLOGICAL EXAMINATION: Status: ACTIVE | Noted: 2020-01-14

## 2020-01-15 ENCOUNTER — ANNUAL EXAM (OUTPATIENT)
Dept: INTERNAL MEDICINE CLINIC | Age: 25
End: 2020-01-15
Payer: COMMERCIAL

## 2020-01-15 VITALS
HEIGHT: 66 IN | TEMPERATURE: 100.1 F | WEIGHT: 284.8 LBS | HEART RATE: 97 BPM | OXYGEN SATURATION: 99 % | BODY MASS INDEX: 45.77 KG/M2 | DIASTOLIC BLOOD PRESSURE: 70 MMHG | SYSTOLIC BLOOD PRESSURE: 130 MMHG

## 2020-01-15 DIAGNOSIS — D69.6 THROMBOCYTOPENIA (HCC): ICD-10-CM

## 2020-01-15 DIAGNOSIS — Z01.419 WOMEN'S ANNUAL ROUTINE GYNECOLOGICAL EXAMINATION: Primary | ICD-10-CM

## 2020-01-15 DIAGNOSIS — D59.8 OTHER ACQUIRED HEMOLYTIC ANEMIAS (HCC): ICD-10-CM

## 2020-01-15 DIAGNOSIS — J06.9 VIRAL UPPER RESPIRATORY TRACT INFECTION: ICD-10-CM

## 2020-01-15 DIAGNOSIS — N89.8 VAGINAL DISCHARGE: ICD-10-CM

## 2020-01-15 DIAGNOSIS — J30.2 SEASONAL ALLERGIES: ICD-10-CM

## 2020-01-15 PROCEDURE — S0612 ANNUAL GYNECOLOGICAL EXAMINA: HCPCS | Performed by: INTERNAL MEDICINE

## 2020-01-15 PROCEDURE — 99214 OFFICE O/P EST MOD 30 MIN: CPT | Performed by: INTERNAL MEDICINE

## 2020-01-15 RX ORDER — PREDNISONE 1 MG/1
5 TABLET ORAL WEEKLY
COMMUNITY
End: 2020-10-21 | Stop reason: ALTCHOICE

## 2020-01-15 NOTE — PATIENT INSTRUCTIONS
Pap Smear   AMBULATORY CARE:   A Pap smear,  or Pap test, is used to screen for cervical cancer  It is also used to find precancerous and cancerous cells of the vulva and vagina  Prepare for a Pap smear: The best time to schedule the test is right after your period stops  Do not have a Pap smear during your monthly period  During a Pap smear:   · You will lie on your back and place your feet on footrests called stirrups  Your healthcare provider will gently insert a device called a speculum into your vagina  The speculum is used to open the walls of your vagina so he can see your cervix  · Your healthcare provider will gently scrape your cervix and vaginal areas for cell samples  The samples are placed in a container with liquid or on a glass slide  They are sent to a lab and examined for abnormal cells  A test for Human Papillomavirus (HPV) may be done at the same time  HPV is a sexually transmitted virus that can cause changes in cervical cells  After your Pap smear:  Your healthcare provider will tell you when you can expect your Pap smear results  You may have some spotting the day of your procedure  When to get a Pap smear:  Pap smears are usually done every 3 to 5 years depending on your age  You may need a Pap smear more often if you have any of the following:  · Positive test result for the human papillomavirus (HPV)    · A history of cervical cancer    · HIV    · A weak immune system    · Exposure to diethylstilbestrol (KULDEEP) medicine when your mother was pregnant with you  © 2017 [employer] [employer] Information is for End User's use only and may not be sold, redistributed or otherwise used for commercial purposes  All illustrations and images included in CareNotes® are the copyrighted property of [employer] , [employer]  or Jeffrey Mayo  The above information is an  only  It is not intended as medical advice for individual conditions or treatments   Talk to your doctor, nurse or pharmacist before following any medical regimen to see if it is safe and effective for you

## 2020-01-15 NOTE — PROGRESS NOTES
Assessment/Plan:    Women's annual routine gynecological exam  - Pap smear done with the smallest speculum available  - samples taken for GC/chlamydia especially as pt has a vaginal discharge  - Breast examination revealed dense breasts bilaterally and an inverted nipple on the left and a flat to almost inverted nipple on the right  - patient was counseled to do monthly self-breast exam  -will follow up with the results of the Pap smear, GC chlamydia and genital culture    Vaginal discharge  - will follow up with the result of the genital culture and GC chlamydia     Diagnoses and all orders for this visit:    Women's annual routine gynecological examination  -     Thinprep Pap Rfx HR HPV  -     VAGINOSIS DNA PROBE (AFFIRM)  -     Genital Comprehensive Culture    Vaginal discharge  -     Thinprep Pap Rfx HR HPV  -     VAGINOSIS DNA PROBE (AFFIRM)  -     Genital Comprehensive Culture    Viral upper respiratory tract infection    Seasonal allergies    Thrombocytopenia (HCC)    Other acquired hemolytic anemias ([facility] )    Other orders  -     predniSONE 5 mg tablet; Take 5 mg by mouth once a week          Subjective:      Patient ID: Maylin Sethi is a 30 y o  female  Gynecologic Exam   The patient's pertinent negatives include no genital itching, genital lesions, genital odor, genital rash, missed menses, pelvic pain, vaginal bleeding or vaginal discharge  Associated symptoms include abdominal pain (at surgical scar), a fever and a sore throat  Pertinent negatives include no back pain, chills, constipation, diarrhea, dysuria, headaches, hematuria, nausea, rash or vomiting  She is not sexually active  She uses nothing for contraception  Her menstrual history has been regular  Patient presents for an annual gynecological examination as well as a follow-up visit  She has a history of ITP and has been seeing hematology    She is currently on a prednisone taper and her results have been normal   This is her 1st Pap smear   She is not sexually active and has never been  She denies vaginal discharge, vaginal itching or pain  Her last menstrual period was about 3 weeks ago and she is due for her regular menstrual period In a week  She is  She denies a family history of breast cancer, ovarian or uterine cancer or cervical cancer  She complains of fever, fatigue, sore throat, abdominal pain near her surgical scar for cholecystectomy  She recently had laparoscopic cholecystectomy about 4 months ago  She denies breast pain, nausea, vomiting, diarrhea constipation  She admits to history of contact  A lot of her  coworkers have symptoms of upper respiratory tract infection  The following portions of the patient's history were reviewed and updated as appropriate:   She  has a past medical history of Allergic and Anxiety  She   Patient Active Problem List    Diagnosis Date Noted    Vaginal discharge 01/15/2020    Women's annual routine gynecological examination 2020    BMI 40 0-44 9, adult ([facility] ) 2019    Hemolytic anemia ([facility] ) 10/16/2019    Need for influenza vaccination 10/12/2019    Cholecystitis 2019    Acute [facility]P ([facility] ) 2019    Hypokalemia 2019    Petechiae 2019    Thrombocytopenia ([facility] ) 2019    Viral upper respiratory tract infection 2019    Seasonal allergies 2019    Other specified anxiety disorders 2019    Morbid obesity ([facility] ) 2019     She  has a past surgical history that includes [place] tooth extraction and CHOLECYSTECTOMY LAPAROSCOPIC (N/A, 2019)  Her family history includes Anxiety disorder in her maternal grandmother; Diabetes in her paternal aunt and paternal uncle; Hypertension in her father; Psoriasis in her paternal uncle  She  reports that she has never smoked  She has never used smokeless tobacco  She reports that she drank alcohol  She reports that she does not use drugs    Current Outpatient Medications   Medication Sig Dispense Refill    ALPRAZolam (XANAX) 0 25 mg tablet Take 1 tablet (0 25 mg total) by mouth daily as needed for anxiety 10 tablet 0    cetirizine (ZyrTEC) 10 mg tablet Take 10 mg by mouth as needed       Multiple Vitamin (MULTIVITAMIN) tablet Take 1 tablet by mouth daily      predniSONE 5 mg tablet Take 5 mg by mouth once a week       No current facility-administered medications for this visit  Current Outpatient Medications on File Prior to Visit   Medication Sig    ALPRAZolam (XANAX) 0 25 mg tablet Take 1 tablet (0 25 mg total) by mouth daily as needed for anxiety    cetirizine (ZyrTEC) 10 mg tablet Take 10 mg by mouth as needed     Multiple Vitamin (MULTIVITAMIN) tablet Take 1 tablet by mouth daily    predniSONE 5 mg tablet Take 5 mg by mouth once a week    [DISCONTINUED] folic acid (FOLVITE) 1 mg tablet Take 1 tablet (1 mg total) by mouth daily    [DISCONTINUED] predniSONE 20 mg tablet 1 tablet alternating with half tablet every other day with food  No current facility-administered medications on file prior to visit  She is allergic to pollen extract       Review of Systems   Constitutional: Positive for fatigue and fever  Negative for activity change, chills and unexpected weight change  HENT: Positive for rhinorrhea, sneezing and sore throat  Negative for ear pain, postnasal drip and sinus pressure  Eyes: Negative for pain  Respiratory: Negative for cough, choking, chest tightness, shortness of breath and wheezing  Cardiovascular: Negative for chest pain, palpitations and leg swelling  Gastrointestinal: Positive for abdominal pain (at surgical scar)  Negative for constipation, diarrhea, nausea and vomiting  Genitourinary: Negative for dysuria, hematuria, missed menses, pelvic pain and vaginal discharge  Musculoskeletal: Negative for arthralgias, back pain, gait problem, joint swelling, myalgias and neck stiffness  Skin: Negative for pallor and rash     Neurological: Negative for dizziness, tremors, seizures, syncope, light-headedness and headaches  Hematological: Negative for adenopathy  Psychiatric/Behavioral: Negative for behavioral problems  Objective:      /70 (BP Location: Left arm, Patient Position: Sitting, Cuff Size: Large)   Pulse 97   Temp 100 1 °F (37 8 °C) (Tympanic)   Ht 5' 5 5" (1 664 m)   Wt 129 kg (284 lb 12 8 oz)   SpO2 99%   BMI 46 67 kg/m²          Physical Exam   Constitutional: She is oriented to person, place, and time  She appears well-developed and well-nourished  No distress  Morbidly obese   HENT:   Head: Normocephalic and atraumatic  Nose: Mucosal edema ( nasal mucosa erythema and edema with swollen turbinates on the left) present  Mouth/Throat: Posterior oropharyngeal edema ( mild) and posterior oropharyngeal erythema (Mild) present  No oropharyngeal exudate  External auditory canal erythema and edema bilaterally   Eyes: Pupils are equal, round, and reactive to light  Conjunctivae and EOM are normal  Right eye exhibits no discharge  Left eye exhibits no discharge  No scleral icterus  Neck: Normal range of motion  Neck supple  No JVD present  No tracheal deviation present  No thyromegaly present  Cardiovascular: Normal rate, regular rhythm, normal heart sounds and intact distal pulses  Exam reveals no gallop and no friction rub  No murmur heard  Pulmonary/Chest: Effort normal and breath sounds normal  No respiratory distress  She has no wheezes  She has no rales  She exhibits no tenderness  Right breast exhibits no mass, no nipple discharge and no tenderness  Left breast exhibits inverted nipple  Left breast exhibits no mass, no nipple discharge and no tenderness  No breast swelling, tenderness or discharge  Patient has very dense breasts bilaterally and her nipples are inverted on the left and flat and almost inverted on the right  Abdominal: Soft   Bowel sounds are normal  She exhibits no distension and no mass  There is no tenderness  There is no rebound and no guarding  Genitourinary: No breast swelling, tenderness or discharge  Cervix exhibits discharge  There is tenderness in the vagina  No erythema or bleeding in the vagina  No foreign body in the vagina  No signs of injury around the vagina  Vaginal discharge (copious, non foul smelling, thick whitish discharge) found  Musculoskeletal: Normal range of motion  She exhibits no edema, tenderness or deformity  Lymphadenopathy:     She has no cervical adenopathy  Neurological: She is alert and oriented to person, place, and time  She has normal reflexes  No cranial nerve deficit  She exhibits normal muscle tone  Coordination normal    Skin: Skin is warm and dry  No rash noted  She is not diaphoretic  No erythema  No pallor  Psychiatric: She has a normal mood and affect   Her behavior is normal          Ancillary Orders on 12/13/2019   Component Date Value Ref Range Status    WBC 01/02/2020 4 85  4 31 - 10 16 Thousand/uL Final    RBC 01/02/2020 4 54  3 81 - 5 12 Million/uL Final    Hemoglobin 01/02/2020 12 2  11 5 - 15 4 g/dL Final    Hematocrit 01/02/2020 39 1  34 8 - 46 1 % Final    MCV 01/02/2020 86  82 - 98 fL Final    MCH 01/02/2020 26 9  26 8 - 34 3 pg Final    MCHC 01/02/2020 31 2* 31 4 - 37 4 g/dL Final    RDW 01/02/2020 13 4  11 6 - 15 1 % Final    MPV 01/02/2020 12 6  8 9 - 12 7 fL Final    Platelets 98/65/6456 205  149 - 390 Thousands/uL Final    nRBC 01/02/2020 0  /100 WBCs Final    Neutrophils Relative 01/02/2020 52  43 - 75 % Final    Immat GRANS % 01/02/2020 0  0 - 2 % Final    Lymphocytes Relative 01/02/2020 34  14 - 44 % Final    Monocytes Relative 01/02/2020 11  4 - 12 % Final    Eosinophils Relative 01/02/2020 2  0 - 6 % Final    Basophils Relative 01/02/2020 1  0 - 1 % Final    Neutrophils Absolute 01/02/2020 2 55  1 85 - 7 62 Thousands/µL Final    Immature Grans Absolute 01/02/2020 0 01  0 00 - 0 20 Thousand/uL Final  Lymphocytes Absolute 01/02/2020 1 63  0 60 - 4 47 Thousands/µL Final    Monocytes Absolute 01/02/2020 0 52  0 17 - 1 22 Thousand/µL Final    Eosinophils Absolute 01/02/2020 0 11  0 00 - 0 61 Thousand/µL Final    Basophils Absolute 01/02/2020 0 03  0 00 - 0 10 Thousands/µL Final    Sodium 01/02/2020 140  136 - 145 mmol/L Final    Potassium 01/02/2020 3 8  3 5 - 5 3 mmol/L Final    Chloride 01/02/2020 108  100 - 108 mmol/L Final    CO2 01/02/2020 27  21 - 32 mmol/L Final    ANION GAP 01/02/2020 5  4 - 13 mmol/L Final    BUN 01/02/2020 11  5 - 25 mg/dL Final    Creatinine 01/02/2020 0 76  0 60 - 1 30 mg/dL Final    Standardized to IDMS reference method    Glucose, Fasting 01/02/2020 82  65 - 99 mg/dL Final      Specimen collection should occur prior to Sulfasalazine administration due to the potential for falsely depressed results  Specimen collection should occur prior to Sulfapyridine administration due to the potential for falsely elevated results   Calcium 01/02/2020 9 5  8 3 - 10 1 mg/dL Final    AST 01/02/2020 19  5 - 45 U/L Final      Specimen collection should occur prior to Sulfasalazine administration due to the potential for falsely depressed results   ALT 01/02/2020 44  12 - 78 U/L Final      Specimen collection should occur prior to Sulfasalazine and/or Sulfapyridine administration due to the potential for falsely depressed results       Alkaline Phosphatase 01/02/2020 57  46 - 116 U/L Final    Total Protein 01/02/2020 6 7  6 4 - 8 2 g/dL Final    Albumin 01/02/2020 3 4* 3 5 - 5 0 g/dL Final    Total Bilirubin 01/02/2020 0 20  0 20 - 1 00 mg/dL Final    eGFR 01/02/2020 109  ml/min/1 73sq m Final   Ancillary Orders on 12/06/2019   Component Date Value Ref Range Status    WBC 12/18/2019 5 52  4 31 - 10 16 Thousand/uL Final    RBC 12/18/2019 4 35  3 81 - 5 12 Million/uL Final    Hemoglobin 12/18/2019 12 3  11 5 - 15 4 g/dL Final    Hematocrit 12/18/2019 38 2  34 8 - 46 1 % Final  MCV 12/18/2019 88  82 - 98 fL Final    MCH 12/18/2019 28 3  26 8 - 34 3 pg Final    MCHC 12/18/2019 32 2  31 4 - 37 4 g/dL Final    RDW 12/18/2019 13 3  11 6 - 15 1 % Final    MPV 12/18/2019 12 5  8 9 - 12 7 fL Final    Platelets 37/76/7378 205  149 - 390 Thousands/uL Final    nRBC 12/18/2019 0  /100 WBCs Final    Neutrophils Relative 12/18/2019 55  43 - 75 % Final    Immat GRANS % 12/18/2019 0  0 - 2 % Final    Lymphocytes Relative 12/18/2019 31  14 - 44 % Final    Monocytes Relative 12/18/2019 11  4 - 12 % Final    Eosinophils Relative 12/18/2019 2  0 - 6 % Final    Basophils Relative 12/18/2019 1  0 - 1 % Final    Neutrophils Absolute 12/18/2019 3 04  1 85 - 7 62 Thousands/µL Final    Immature Grans Absolute 12/18/2019 0 01  0 00 - 0 20 Thousand/uL Final    Lymphocytes Absolute 12/18/2019 1 72  0 60 - 4 47 Thousands/µL Final    Monocytes Absolute 12/18/2019 0 62  0 17 - 1 22 Thousand/µL Final    Eosinophils Absolute 12/18/2019 0 09  0 00 - 0 61 Thousand/µL Final    Basophils Absolute 12/18/2019 0 04  0 00 - 0 10 Thousands/µL Final    Sodium 12/18/2019 139  136 - 145 mmol/L Final    Potassium 12/18/2019 3 7  3 5 - 5 3 mmol/L Final    Chloride 12/18/2019 108  100 - 108 mmol/L Final    CO2 12/18/2019 25  21 - 32 mmol/L Final    ANION GAP 12/18/2019 6  4 - 13 mmol/L Final    BUN 12/18/2019 11  5 - 25 mg/dL Final    Creatinine 12/18/2019 0 68  0 60 - 1 30 mg/dL Final    Standardized to IDMS reference method    Glucose, Fasting 12/18/2019 79  65 - 99 mg/dL Final      Specimen collection should occur prior to Sulfasalazine administration due to the potential for falsely depressed results  Specimen collection should occur prior to Sulfapyridine administration due to the potential for falsely elevated results      Calcium 12/18/2019 9 2  8 3 - 10 1 mg/dL Final    AST 12/18/2019 12  5 - 45 U/L Final      Specimen collection should occur prior to Sulfasalazine administration due to the potential for falsely depressed results   ALT 12/18/2019 30  12 - 78 U/L Final      Specimen collection should occur prior to Sulfasalazine and/or Sulfapyridine administration due to the potential for falsely depressed results       Alkaline Phosphatase 12/18/2019 60  46 - 116 U/L Final    Total Protein 12/18/2019 6 3* 6 4 - 8 2 g/dL Final    Albumin 12/18/2019 3 2* 3 5 - 5 0 g/dL Final    Total Bilirubin 12/18/2019 0 22  0 20 - 1 00 mg/dL Final    eGFR 12/18/2019 121  ml/min/1 73sq m Final   Ancillary Orders on 11/29/2019   Component Date Value Ref Range Status    WBC 12/04/2019 5 64  4 31 - 10 16 Thousand/uL Final    RBC 12/04/2019 4 30  3 81 - 5 12 Million/uL Final    Hemoglobin 12/04/2019 12 1  11 5 - 15 4 g/dL Final    Hematocrit 12/04/2019 39 5  34 8 - 46 1 % Final    MCV 12/04/2019 92  82 - 98 fL Final    MCH 12/04/2019 28 1  26 8 - 34 3 pg Final    MCHC 12/04/2019 30 6* 31 4 - 37 4 g/dL Final    RDW 12/04/2019 13 5  11 6 - 15 1 % Final    MPV 12/04/2019 12 6  8 9 - 12 7 fL Final    Platelets 54/26/4776 212  149 - 390 Thousands/uL Final    nRBC 12/04/2019 0  /100 WBCs Final    Neutrophils Relative 12/04/2019 55  43 - 75 % Final    Immat GRANS % 12/04/2019 0  0 - 2 % Final    Lymphocytes Relative 12/04/2019 33  14 - 44 % Final    Monocytes Relative 12/04/2019 10  4 - 12 % Final    Eosinophils Relative 12/04/2019 1  0 - 6 % Final    Basophils Relative 12/04/2019 1  0 - 1 % Final    Neutrophils Absolute 12/04/2019 3 12  1 85 - 7 62 Thousands/µL Final    Immature Grans Absolute 12/04/2019 0 02  0 00 - 0 20 Thousand/uL Final    Lymphocytes Absolute 12/04/2019 1 85  0 60 - 4 47 Thousands/µL Final    Monocytes Absolute 12/04/2019 0 55  0 17 - 1 22 Thousand/µL Final    Eosinophils Absolute 12/04/2019 0 07  0 00 - 0 61 Thousand/µL Final    Basophils Absolute 12/04/2019 0 03  0 00 - 0 10 Thousands/µL Final    Sodium 12/04/2019 141  136 - 145 mmol/L Final    Potassium 12/04/2019 3 8  3 5 - 5 3 mmol/L Final    Chloride 12/04/2019 107  100 - 108 mmol/L Final    CO2 12/04/2019 27  21 - 32 mmol/L Final    ANION GAP 12/04/2019 7  4 - 13 mmol/L Final    BUN 12/04/2019 12  5 - 25 mg/dL Final    Creatinine 12/04/2019 0 73  0 60 - 1 30 mg/dL Final    Standardized to IDMS reference method    Glucose, Fasting 12/04/2019 80  65 - 99 mg/dL Final      Specimen collection should occur prior to Sulfasalazine administration due to the potential for falsely depressed results  Specimen collection should occur prior to Sulfapyridine administration due to the potential for falsely elevated results   Calcium 12/04/2019 9 2  8 3 - 10 1 mg/dL Final    AST 12/04/2019 13  5 - 45 U/L Final      Specimen collection should occur prior to Sulfasalazine administration due to the potential for falsely depressed results   ALT 12/04/2019 27  12 - 78 U/L Final      Specimen collection should occur prior to Sulfasalazine and/or Sulfapyridine administration due to the potential for falsely depressed results       Alkaline Phosphatase 12/04/2019 58  46 - 116 U/L Final    Total Protein 12/04/2019 6 6  6 4 - 8 2 g/dL Final    Albumin 12/04/2019 3 7  3 5 - 5 0 g/dL Final    Total Bilirubin 12/04/2019 0 18* 0 20 - 1 00 mg/dL Final    eGFR 12/04/2019 114  ml/min/1 73sq m Final   Ancillary Orders on 11/22/2019   Component Date Value Ref Range Status    WBC 11/25/2019 6 47  4 31 - 10 16 Thousand/uL Final    RBC 11/25/2019 4 31  3 81 - 5 12 Million/uL Final    Hemoglobin 11/25/2019 12 5  11 5 - 15 4 g/dL Final    Hematocrit 11/25/2019 39 7  34 8 - 46 1 % Final    MCV 11/25/2019 92  82 - 98 fL Final    MCH 11/25/2019 29 0  26 8 - 34 3 pg Final    MCHC 11/25/2019 31 5  31 4 - 37 4 g/dL Final    RDW 11/25/2019 13 3  11 6 - 15 1 % Final    MPV 11/25/2019 13 0* 8 9 - 12 7 fL Final    Platelets 81/86/2240 205  149 - 390 Thousands/uL Final    nRBC 11/25/2019 0  /100 WBCs Final    Neutrophils Relative 11/25/2019 55  43 - 75 % Final    Immat GRANS % 11/25/2019 1  0 - 2 % Final    Lymphocytes Relative 11/25/2019 33  14 - 44 % Final    Monocytes Relative 11/25/2019 9  4 - 12 % Final    Eosinophils Relative 11/25/2019 1  0 - 6 % Final    Basophils Relative 11/25/2019 1  0 - 1 % Final    Neutrophils Absolute 11/25/2019 3 67  1 85 - 7 62 Thousands/µL Final    Immature Grans Absolute 11/25/2019 0 03  0 00 - 0 20 Thousand/uL Final    Lymphocytes Absolute 11/25/2019 2 10  0 60 - 4 47 Thousands/µL Final    Monocytes Absolute 11/25/2019 0 57  0 17 - 1 22 Thousand/µL Final    Eosinophils Absolute 11/25/2019 0 07  0 00 - 0 61 Thousand/µL Final    Basophils Absolute 11/25/2019 0 03  0 00 - 0 10 Thousands/µL Final    Sodium 11/25/2019 140  136 - 145 mmol/L Final    Potassium 11/25/2019 3 5  3 5 - 5 3 mmol/L Final    Chloride 11/25/2019 107  100 - 108 mmol/L Final    CO2 11/25/2019 24  21 - 32 mmol/L Final    ANION GAP 11/25/2019 9  4 - 13 mmol/L Final    BUN 11/25/2019 12  5 - 25 mg/dL Final    Creatinine 11/25/2019 0 70  0 60 - 1 30 mg/dL Final    Standardized to IDMS reference method    Glucose, Fasting 11/25/2019 74  65 - 99 mg/dL Final      Specimen collection should occur prior to Sulfasalazine administration due to the potential for falsely depressed results  Specimen collection should occur prior to Sulfapyridine administration due to the potential for falsely elevated results   Calcium 11/25/2019 9 0  8 3 - 10 1 mg/dL Final    AST 11/25/2019 12  5 - 45 U/L Final      Specimen collection should occur prior to Sulfasalazine administration due to the potential for falsely depressed results   ALT 11/25/2019 28  12 - 78 U/L Final      Specimen collection should occur prior to Sulfasalazine and/or Sulfapyridine administration due to the potential for falsely depressed results       Alkaline Phosphatase 11/25/2019 54  46 - 116 U/L Final    Total Protein 11/25/2019 6 5  6 4 - 8 2 g/dL Final    Albumin 11/25/2019 3 3* 3 5 - 5 0 g/dL Final    Total Bilirubin 11/25/2019 0 17* 0 20 - 1 00 mg/dL Final    eGFR 11/25/2019 120  ml/min/1 73sq m Final   Ancillary Orders on 11/15/2019   Component Date Value Ref Range Status    WBC 11/18/2019 6 29  4 31 - 10 16 Thousand/uL Final    RBC 11/18/2019 4 31  3 81 - 5 12 Million/uL Final    Hemoglobin 11/18/2019 12 9  11 5 - 15 4 g/dL Final    Hematocrit 11/18/2019 40 5  34 8 - 46 1 % Final    MCV 11/18/2019 94  82 - 98 fL Final    MCH 11/18/2019 29 9  26 8 - 34 3 pg Final    MCHC 11/18/2019 31 9  31 4 - 37 4 g/dL Final    RDW 11/18/2019 13 5  11 6 - 15 1 % Final    MPV 11/18/2019 12 2  8 9 - 12 7 fL Final    Platelets 51/90/0839 242  149 - 390 Thousands/uL Final    nRBC 11/18/2019 0  /100 WBCs Final    Neutrophils Relative 11/18/2019 55  43 - 75 % Final    Immat GRANS % 11/18/2019 1  0 - 2 % Final    Lymphocytes Relative 11/18/2019 33  14 - 44 % Final    Monocytes Relative 11/18/2019 9  4 - 12 % Final    Eosinophils Relative 11/18/2019 1  0 - 6 % Final    Basophils Relative 11/18/2019 1  0 - 1 % Final    Neutrophils Absolute 11/18/2019 3 49  1 85 - 7 62 Thousands/µL Final    Immature Grans Absolute 11/18/2019 0 04  0 00 - 0 20 Thousand/uL Final    Lymphocytes Absolute 11/18/2019 2 10  0 60 - 4 47 Thousands/µL Final    Monocytes Absolute 11/18/2019 0 57  0 17 - 1 22 Thousand/µL Final    Eosinophils Absolute 11/18/2019 0 06  0 00 - 0 61 Thousand/µL Final    Basophils Absolute 11/18/2019 0 03  0 00 - 0 10 Thousands/µL Final    Sodium 11/18/2019 140  136 - 145 mmol/L Final    Potassium 11/18/2019 3 7  3 5 - 5 3 mmol/L Final    Chloride 11/18/2019 107  100 - 108 mmol/L Final    CO2 11/18/2019 26  21 - 32 mmol/L Final    ANION GAP 11/18/2019 7  4 - 13 mmol/L Final    BUN 11/18/2019 9  5 - 25 mg/dL Final    Creatinine 11/18/2019 0 72  0 60 - 1 30 mg/dL Final    Standardized to IDMS reference method    Glucose, Fasting 11/18/2019 82  65 - 99 mg/dL Final      Specimen collection should occur prior to Sulfasalazine administration due to the potential for falsely depressed results  Specimen collection should occur prior to Sulfapyridine administration due to the potential for falsely elevated results   Calcium 11/18/2019 9 2  8 3 - 10 1 mg/dL Final    AST 11/18/2019 10  5 - 45 U/L Final      Specimen collection should occur prior to Sulfasalazine administration due to the potential for falsely depressed results   ALT 11/18/2019 22  12 - 78 U/L Final      Specimen collection should occur prior to Sulfasalazine and/or Sulfapyridine administration due to the potential for falsely depressed results       Alkaline Phosphatase 11/18/2019 54  46 - 116 U/L Final    Total Protein 11/18/2019 7 0  6 4 - 8 2 g/dL Final    Albumin 11/18/2019 3 5  3 5 - 5 0 g/dL Final    Total Bilirubin 11/18/2019 0 20  0 20 - 1 00 mg/dL Final    eGFR 11/18/2019 116  ml/min/1 73sq m Final   Ancillary Orders on 11/08/2019   Component Date Value Ref Range Status    WBC 11/11/2019 6 68  4 31 - 10 16 Thousand/uL Final    RBC 11/11/2019 4 04  3 81 - 5 12 Million/uL Final    Hemoglobin 11/11/2019 12 4  11 5 - 15 4 g/dL Final    Hematocrit 11/11/2019 39 3  34 8 - 46 1 % Final    MCV 11/11/2019 97  82 - 98 fL Final    MCH 11/11/2019 30 7  26 8 - 34 3 pg Final    MCHC 11/11/2019 31 6  31 4 - 37 4 g/dL Final    RDW 11/11/2019 14 0  11 6 - 15 1 % Final    MPV 11/11/2019 12 0  8 9 - 12 7 fL Final    Platelets 90/95/6528 254  149 - 390 Thousands/uL Final    nRBC 11/11/2019 0  /100 WBCs Final    Neutrophils Relative 11/11/2019 56  43 - 75 % Final    Immat GRANS % 11/11/2019 0  0 - 2 % Final    Lymphocytes Relative 11/11/2019 34  14 - 44 % Final    Monocytes Relative 11/11/2019 9  4 - 12 % Final    Eosinophils Relative 11/11/2019 1  0 - 6 % Final    Basophils Relative 11/11/2019 0  0 - 1 % Final    Neutrophils Absolute 11/11/2019 3 65  1 85 - 7 62 Thousands/µL Final    Immature Grans Absolute 11/11/2019 0 02  0 00 - 0 20 Thousand/uL Final    Lymphocytes Absolute 11/11/2019 2 29  0 60 - 4 47 Thousands/µL Final    Monocytes Absolute 11/11/2019 0 63  0 17 - 1 22 Thousand/µL Final    Eosinophils Absolute 11/11/2019 0 06  0 00 - 0 61 Thousand/µL Final    Basophils Absolute 11/11/2019 0 03  0 00 - 0 10 Thousands/µL Final    Sodium 11/11/2019 140  136 - 145 mmol/L Final    Potassium 11/11/2019 3 8  3 5 - 5 3 mmol/L Final    Chloride 11/11/2019 106  100 - 108 mmol/L Final    CO2 11/11/2019 27  21 - 32 mmol/L Final    ANION GAP 11/11/2019 7  4 - 13 mmol/L Final    BUN 11/11/2019 10  5 - 25 mg/dL Final    Creatinine 11/11/2019 0 74  0 60 - 1 30 mg/dL Final    Standardized to IDMS reference method    Glucose, Fasting 11/11/2019 82  65 - 99 mg/dL Final      Specimen collection should occur prior to Sulfasalazine administration due to the potential for falsely depressed results  Specimen collection should occur prior to Sulfapyridine administration due to the potential for falsely elevated results   Calcium 11/11/2019 9 2  8 3 - 10 1 mg/dL Final    AST 11/11/2019 9  5 - 45 U/L Final      Specimen collection should occur prior to Sulfasalazine administration due to the potential for falsely depressed results   ALT 11/11/2019 24  12 - 78 U/L Final      Specimen collection should occur prior to Sulfasalazine and/or Sulfapyridine administration due to the potential for falsely depressed results       Alkaline Phosphatase 11/11/2019 48  46 - 116 U/L Final    Total Protein 11/11/2019 7 0  6 4 - 8 2 g/dL Final    Albumin 11/11/2019 3 5  3 5 - 5 0 g/dL Final    Total Bilirubin 11/11/2019 0 22  0 20 - 1 00 mg/dL Final    eGFR 11/11/2019 112  ml/min/1 73sq m Final   Ancillary Orders on 11/01/2019   Component Date Value Ref Range Status    WBC 11/04/2019 6 33  4 31 - 10 16 Thousand/uL Final    RBC 11/04/2019 3 82  3 81 - 5 12 Million/uL Final    Hemoglobin 11/04/2019 11 9  11 5 - 15 4 g/dL Final    Hematocrit 11/04/2019 38 1  34 8 - 46 1 % Final    MCV 11/04/2019 100* 82 - 98 fL Final    MCH 11/04/2019 31 2  26 8 - 34 3 pg Final    MCHC 11/04/2019 31 2* 31 4 - 37 4 g/dL Final    RDW 11/04/2019 14 7  11 6 - 15 1 % Final    MPV 11/04/2019 11 9  8 9 - 12 7 fL Final    Platelets 32/46/7837 207  149 - 390 Thousands/uL Final    nRBC 11/04/2019 0  /100 WBCs Final    Neutrophils Relative 11/04/2019 50  43 - 75 % Final    Immat GRANS % 11/04/2019 1  0 - 2 % Final    Lymphocytes Relative 11/04/2019 39  14 - 44 % Final    Monocytes Relative 11/04/2019 8  4 - 12 % Final    Eosinophils Relative 11/04/2019 1  0 - 6 % Final    Basophils Relative 11/04/2019 1  0 - 1 % Final    Neutrophils Absolute 11/04/2019 3 27  1 85 - 7 62 Thousands/µL Final    Immature Grans Absolute 11/04/2019 0 04  0 00 - 0 20 Thousand/uL Final    Lymphocytes Absolute 11/04/2019 2 44  0 60 - 4 47 Thousands/µL Final    Monocytes Absolute 11/04/2019 0 50  0 17 - 1 22 Thousand/µL Final    Eosinophils Absolute 11/04/2019 0 05  0 00 - 0 61 Thousand/µL Final    Basophils Absolute 11/04/2019 0 03  0 00 - 0 10 Thousands/µL Final    Sodium 11/04/2019 140  136 - 145 mmol/L Final    Potassium 11/04/2019 3 7  3 5 - 5 3 mmol/L Final    Chloride 11/04/2019 108  100 - 108 mmol/L Final    CO2 11/04/2019 26  21 - 32 mmol/L Final    ANION GAP 11/04/2019 6  4 - 13 mmol/L Final    BUN 11/04/2019 14  5 - 25 mg/dL Final    Creatinine 11/04/2019 0 74  0 60 - 1 30 mg/dL Final    Standardized to IDMS reference method    Glucose, Fasting 11/04/2019 74  65 - 99 mg/dL Final      Specimen collection should occur prior to Sulfasalazine administration due to the potential for falsely depressed results  Specimen collection should occur prior to Sulfapyridine administration due to the potential for falsely elevated results      Calcium 11/04/2019 9 0  8 3 - 10 1 mg/dL Final    AST 11/04/2019 12  5 - 45 U/L Final      Specimen collection should occur prior to Sulfasalazine administration due to the potential for falsely depressed results   ALT 11/04/2019 27  12 - 78 U/L Final      Specimen collection should occur prior to Sulfasalazine and/or Sulfapyridine administration due to the potential for falsely depressed results       Alkaline Phosphatase 11/04/2019 47  46 - 116 U/L Final    Total Protein 11/04/2019 7 0  6 4 - 8 2 g/dL Final    Albumin 11/04/2019 3 5  3 5 - 5 0 g/dL Final    Total Bilirubin 11/04/2019 0 21  0 20 - 1 00 mg/dL Final    eGFR 11/04/2019 112  ml/min/1 73sq m Final   Orders Only on 10/28/2019   Component Date Value Ref Range Status    WBC 10/31/2019 5 51  4 31 - 10 16 Thousand/uL Final    RBC 10/31/2019 3 69* 3 81 - 5 12 Million/uL Final    Hemoglobin 10/31/2019 11 7  11 5 - 15 4 g/dL Final    Hematocrit 10/31/2019 36 8  34 8 - 46 1 % Final    MCV 10/31/2019 100* 82 - 98 fL Final    MCH 10/31/2019 31 7  26 8 - 34 3 pg Final    MCHC 10/31/2019 31 8  31 4 - 37 4 g/dL Final    RDW 10/31/2019 15 5* 11 6 - 15 1 % Final    MPV 10/31/2019 11 9  8 9 - 12 7 fL Final    Platelets 96/51/2860 159  149 - 390 Thousands/uL Final    nRBC 10/31/2019 0  /100 WBCs Final    Neutrophils Relative 10/31/2019 54  43 - 75 % Final    Immat GRANS % 10/31/2019 1  0 - 2 % Final    Lymphocytes Relative 10/31/2019 35  14 - 44 % Final    Monocytes Relative 10/31/2019 8  4 - 12 % Final    Eosinophils Relative 10/31/2019 1  0 - 6 % Final    Basophils Relative 10/31/2019 1  0 - 1 % Final    Neutrophils Absolute 10/31/2019 3 03  1 85 - 7 62 Thousands/µL Final    Immature Grans Absolute 10/31/2019 0 04  0 00 - 0 20 Thousand/uL Final    Lymphocytes Absolute 10/31/2019 1 90  0 60 - 4 47 Thousands/µL Final    Monocytes Absolute 10/31/2019 0 46  0 17 - 1 22 Thousand/µL Final    Eosinophils Absolute 10/31/2019 0 05  0 00 - 0 61 Thousand/µL Final    Basophils Absolute 10/31/2019 0 03  0 00 - 0 10 Thousands/µL Final    Sodium 10/31/2019 142  136 - 145 mmol/L Final    Potassium 10/31/2019 3 8  3 5 - 5 3 mmol/L Final    Chloride 10/31/2019 106  100 - 108 mmol/L Final    CO2 10/31/2019 28  21 - 32 mmol/L Final    ANION GAP 10/31/2019 8  4 - 13 mmol/L Final    BUN 10/31/2019 15  5 - 25 mg/dL Final    Creatinine 10/31/2019 0 78  0 60 - 1 30 mg/dL Final    Standardized to IDMS reference method    Glucose, Fasting 10/31/2019 74  65 - 99 mg/dL Final      Specimen collection should occur prior to Sulfasalazine administration due to the potential for falsely depressed results  Specimen collection should occur prior to Sulfapyridine administration due to the potential for falsely elevated results   Calcium 10/31/2019 9 0  8 3 - 10 1 mg/dL Final    AST 10/31/2019 11  5 - 45 U/L Final      Specimen collection should occur prior to Sulfasalazine administration due to the potential for falsely depressed results   ALT 10/31/2019 25  12 - 78 U/L Final      Specimen collection should occur prior to Sulfasalazine and/or Sulfapyridine administration due to the potential for falsely depressed results       Alkaline Phosphatase 10/31/2019 48  46 - 116 U/L Final    Total Protein 10/31/2019 7 1  6 4 - 8 2 g/dL Final    Albumin 10/31/2019 3 7  3 5 - 5 0 g/dL Final    Total Bilirubin 10/31/2019 0 29  0 20 - 1 00 mg/dL Final    eGFR 10/31/2019 105  ml/min/1 73sq m Final   Appointment on 10/28/2019   Component Date Value Ref Range Status    WBC 10/28/2019 5 78  4 31 - 10 16 Thousand/uL Final    RBC 10/28/2019 3 85  3 81 - 5 12 Million/uL Final    Hemoglobin 10/28/2019 12 0  11 5 - 15 4 g/dL Final    Hematocrit 10/28/2019 38 6  34 8 - 46 1 % Final    MCV 10/28/2019 100* 82 - 98 fL Final    MCH 10/28/2019 31 2  26 8 - 34 3 pg Final    MCHC 10/28/2019 31 1* 31 4 - 37 4 g/dL Final    RDW 10/28/2019 15 9* 11 6 - 15 1 % Final    MPV 10/28/2019 11 7  8 9 - 12 7 fL Final    Platelets 47/97/7511 173  149 - 390 Thousands/uL Final    nRBC 10/28/2019 0  /100 WBCs Final    Neutrophils Relative 10/28/2019 57  43 - 75 % Final    Immat GRANS % 10/28/2019 1  0 - 2 % Final    Lymphocytes Relative 10/28/2019 31  14 - 44 % Final    Monocytes Relative 10/28/2019 9  4 - 12 % Final    Eosinophils Relative 10/28/2019 1  0 - 6 % Final    Basophils Relative 10/28/2019 1  0 - 1 % Final    Neutrophils Absolute 10/28/2019 3 36  1 85 - 7 62 Thousands/µL Final    Immature Grans Absolute 10/28/2019 0 03  0 00 - 0 20 Thousand/uL Final    Lymphocytes Absolute 10/28/2019 1 79  0 60 - 4 47 Thousands/µL Final    Monocytes Absolute 10/28/2019 0 53  0 17 - 1 22 Thousand/µL Final    Eosinophils Absolute 10/28/2019 0 04  0 00 - 0 61 Thousand/µL Final    Basophils Absolute 10/28/2019 0 03  0 00 - 0 10 Thousands/µL Final    Sodium 10/28/2019 139  136 - 145 mmol/L Final    Potassium 10/28/2019 3 7  3 5 - 5 3 mmol/L Final    Chloride 10/28/2019 108  100 - 108 mmol/L Final    CO2 10/28/2019 24  21 - 32 mmol/L Final    ANION GAP 10/28/2019 7  4 - 13 mmol/L Final    BUN 10/28/2019 15  5 - 25 mg/dL Final    Creatinine 10/28/2019 0 67  0 60 - 1 30 mg/dL Final    Standardized to IDMS reference method    Glucose, Fasting 10/28/2019 77  65 - 99 mg/dL Final      Specimen collection should occur prior to Sulfasalazine administration due to the potential for falsely depressed results  Specimen collection should occur prior to Sulfapyridine administration due to the potential for falsely elevated results   Calcium 10/28/2019 9 0  8 3 - 10 1 mg/dL Final    AST 10/28/2019 13  5 - 45 U/L Final      Specimen collection should occur prior to Sulfasalazine administration due to the potential for falsely depressed results   ALT 10/28/2019 30  12 - 78 U/L Final      Specimen collection should occur prior to Sulfasalazine and/or Sulfapyridine administration due to the potential for falsely depressed results       Alkaline Phosphatase 10/28/2019 46  46 - 116 U/L Final    Total Protein 10/28/2019 7 1  6 4 - 8 2 g/dL Final    Albumin 10/28/2019 3 5  3 5 - 5 0 g/dL Final    Total Bilirubin 10/28/2019 0 28  0 20 - 1 00 mg/dL Final    eGFR 10/28/2019 122  ml/min/1 73sq m Final   Appointment on 10/21/2019   Component Date Value Ref Range Status    WBC 10/21/2019 5 63  4 31 - 10 16 Thousand/uL Final    RBC 10/21/2019 3 40* 3 81 - 5 12 Million/uL Final    Hemoglobin 10/21/2019 10 8* 11 5 - 15 4 g/dL Final    Hematocrit 10/21/2019 34 5* 34 8 - 46 1 % Final    MCV 10/21/2019 102* 82 - 98 fL Final    MCH 10/21/2019 31 8  26 8 - 34 3 pg Final    MCHC 10/21/2019 31 3* 31 4 - 37 4 g/dL Final    RDW 10/21/2019 18 4* 11 6 - 15 1 % Final    MPV 10/21/2019 11 3  8 9 - 12 7 fL Final    Platelets 47/24/6923 271  149 - 390 Thousands/uL Final    nRBC 10/21/2019 0  /100 WBCs Final    Neutrophils Relative 10/21/2019 52  43 - 75 % Final    Immat GRANS % 10/21/2019 1  0 - 2 % Final    Lymphocytes Relative 10/21/2019 37  14 - 44 % Final    Monocytes Relative 10/21/2019 9  4 - 12 % Final    Eosinophils Relative 10/21/2019 1  0 - 6 % Final    Basophils Relative 10/21/2019 0  0 - 1 % Final    Neutrophils Absolute 10/21/2019 2 99  1 85 - 7 62 Thousands/µL Final    Immature Grans Absolute 10/21/2019 0 03  0 00 - 0 20 Thousand/uL Final    Lymphocytes Absolute 10/21/2019 2 08  0 60 - 4 47 Thousands/µL Final    Monocytes Absolute 10/21/2019 0 49  0 17 - 1 22 Thousand/µL Final    Eosinophils Absolute 10/21/2019 0 03  0 00 - 0 61 Thousand/µL Final    Basophils Absolute 10/21/2019 0 01  0 00 - 0 10 Thousands/µL Final    Sodium 10/21/2019 139  136 - 145 mmol/L Final    Potassium 10/21/2019 3 5  3 5 - 5 3 mmol/L Final    Chloride 10/21/2019 108  100 - 108 mmol/L Final    CO2 10/21/2019 24  21 - 32 mmol/L Final    ANION GAP 10/21/2019 7  4 - 13 mmol/L Final    BUN 10/21/2019 14  5 - 25 mg/dL Final    Creatinine 10/21/2019 0 78  0 60 - 1 30 mg/dL Final    Standardized to IDMS reference method    Glucose, Fasting 10/21/2019 75  65 - 99 mg/dL Final      Specimen collection should occur prior to Sulfasalazine administration due to the potential for falsely depressed results  Specimen collection should occur prior to Sulfapyridine administration due to the potential for falsely elevated results   Calcium 10/21/2019 9 0  8 3 - 10 1 mg/dL Final    AST 10/21/2019 15  5 - 45 U/L Final      Specimen collection should occur prior to Sulfasalazine administration due to the potential for falsely depressed results   ALT 10/21/2019 38  12 - 78 U/L Final      Specimen collection should occur prior to Sulfasalazine and/or Sulfapyridine administration due to the potential for falsely depressed results   Alkaline Phosphatase 10/21/2019 45* 46 - 116 U/L Final    Total Protein 10/21/2019 7 2  6 4 - 8 2 g/dL Final    Albumin 10/21/2019 3 3* 3 5 - 5 0 g/dL Final    Total Bilirubin 10/21/2019 0 37  0 20 - 1 00 mg/dL Final    eGFR 10/21/2019 105  ml/min/1 73sq m Final   There may be more visits with results that are not included

## 2020-01-16 NOTE — PROGRESS NOTES
Assessment/Plan:    Viral upper respiratory tract infection  - will manage symptomatically with over-the-counter Tylenol or ibuprofen for pain, Mucinex for cough and Flonase for rhinitis  - patient has been counseled to return to the office or call the office if her symptoms persist beyond 7-10 days    Seasonal allergies  - continue with Zyrtec    Thrombocytopenia  - well controlled  -continue with prednisone taper  -continue to follow up with Hematology-Oncology    Hemolytic anemia  - resolved  -continue to follow up with hematology/oncology    Anxiety  - well controlled  -continue with alprazolam     Diagnoses and all orders for this visit:    Women's annual routine gynecological examination  -     Thinprep Pap Rfx HR HPV  -     VAGINOSIS DNA PROBE (AFFIRM)  -     Genital Comprehensive Culture    Vaginal discharge  -     Thinprep Pap Rfx HR HPV  -     VAGINOSIS DNA PROBE (AFFIRM)  -     Genital Comprehensive Culture    Viral upper respiratory tract infection    Seasonal allergies    Thrombocytopenia ([address] )    Other acquired hemolytic anemias ([facility] )    Other orders  -     predniSONE 5 mg tablet; Take 5 mg by mouth once a week          Subjective:      Patient ID: Daysi Quinones is a 30 y o  female  HPI  Patient presents for a Pap smear as well as follow-up visit and also has complaints  She has a history of ITP and has been seeing hematology  Her symptoms of petechiae have resolved and she has no complaints of bleeding from any body orifice  She is currently on a prednisone taper and her results have been normal    She complains of fever, fatigue, sore throat, abdominal pain near her surgical scar for cholecystectomy  She recently had laparoscopic cholecystectomy about 4 months ago  She denies breast pain, nausea, vomiting, diarrhea constipation  She admits to history of contact  A lot of her  coworkers have symptoms of upper respiratory tract infection  She does not smoke      The following portions of the patient's history were reviewed and updated as appropriate:   She  has a past medical history of Allergic and Anxiety  She   Patient Active Problem List    Diagnosis Date Noted    Vaginal discharge 01/15/2020    Women's annual routine gynecological examination 01/14/2020    BMI 40 0-44 9, adult ([facility] ) 11/01/2019    Hemolytic anemia ([facility] ) 10/16/2019    Need for influenza vaccination 10/12/2019    Cholecystitis 09/18/2019    Acute ITP ([facility] ) 09/11/2019    Hypokalemia 09/11/2019    Petechiae 09/03/2019    Thrombocytopenia ([facility] ) 09/03/2019    Viral upper respiratory tract infection 07/29/2019    Seasonal allergies 07/01/2019    Other specified anxiety disorders 07/01/2019    Morbid obesity ([facility] ) 07/01/2019     She  has a past surgical history that includes [place] tooth extraction and CHOLECYSTECTOMY LAPAROSCOPIC (N/A, 9/19/2019)  Her family history includes Anxiety disorder in her maternal grandmother; Diabetes in her paternal aunt and paternal uncle; Hypertension in her father; Psoriasis in her paternal uncle  She  reports that she has never smoked  She has never used smokeless tobacco  She reports that she drank alcohol  She reports that she does not use drugs  Current Outpatient Medications   Medication Sig Dispense Refill    ALPRAZolam (XANAX) 0 25 mg tablet Take 1 tablet (0 25 mg total) by mouth daily as needed for anxiety 10 tablet 0    cetirizine (ZyrTEC) 10 mg tablet Take 10 mg by mouth as needed       Multiple Vitamin (MULTIVITAMIN) tablet Take 1 tablet by mouth daily      predniSONE 5 mg tablet Take 5 mg by mouth once a week       No current facility-administered medications for this visit        Current Outpatient Medications on File Prior to Visit   Medication Sig    ALPRAZolam (XANAX) 0 25 mg tablet Take 1 tablet (0 25 mg total) by mouth daily as needed for anxiety    cetirizine (ZyrTEC) 10 mg tablet Take 10 mg by mouth as needed     Multiple Vitamin (MULTIVITAMIN) tablet Take 1 tablet by mouth daily    predniSONE 5 mg tablet Take 5 mg by mouth once a week    [DISCONTINUED] folic acid (FOLVITE) 1 mg tablet Take 1 tablet (1 mg total) by mouth daily    [DISCONTINUED] predniSONE 20 mg tablet 1 tablet alternating with half tablet every other day with food  No current facility-administered medications on file prior to visit  She is allergic to pollen extract       Review of Systems   Constitutional: Positive for fatigue and fever  Negative for activity change, chills and unexpected weight change  HENT: Positive for sore throat  Negative for ear pain, postnasal drip, rhinorrhea and sinus pressure  Eyes: Negative for pain  Respiratory: Negative for cough, choking, chest tightness, shortness of breath and wheezing  Cardiovascular: Negative for chest pain, palpitations and leg swelling  Gastrointestinal: Positive for abdominal pain (At the scar for her laparoscopy cholecystectomy in the lower abdomen)  Negative for constipation, diarrhea, nausea and vomiting  Genitourinary: Negative for dysuria and hematuria  Musculoskeletal: Negative for arthralgias, back pain, gait problem, joint swelling, myalgias and neck stiffness  Skin: Negative for pallor and rash  Neurological: Negative for dizziness, tremors, seizures, syncope, light-headedness and headaches  Hematological: Negative for adenopathy  Psychiatric/Behavioral: Negative for behavioral problems  Objective:      /70 (BP Location: Left arm, Patient Position: Sitting, Cuff Size: Large)   Pulse 97   Temp 100 1 °F (37 8 °C) (Tympanic)   Ht 5' 5 5" (1 664 m)   Wt 129 kg (284 lb 12 8 oz)   SpO2 99%   BMI 46 67 kg/m²          Physical Exam   Constitutional: She is oriented to person, place, and time  She appears well-developed and well-nourished  No distress  Morbidly obese  Warm to touch   HENT:   Head: Normocephalic and atraumatic     Right Ear: External ear normal    Left Ear: External ear normal  Nose: Mucosal edema (Nasal mucosa erythema and edema with swollen turbinates on the left) present  Mouth/Throat: Posterior oropharyngeal edema and posterior oropharyngeal erythema present  No oropharyngeal exudate  Bilateral external auditory canal erythema and edema, mild   Eyes: Pupils are equal, round, and reactive to light  Conjunctivae and EOM are normal  Right eye exhibits no discharge  Left eye exhibits no discharge  No scleral icterus  Neck: Normal range of motion  Neck supple  No JVD present  No tracheal deviation present  No thyromegaly present  Cardiovascular: Normal rate, regular rhythm, normal heart sounds and intact distal pulses  Exam reveals no gallop and no friction rub  No murmur heard  Pulmonary/Chest: Effort normal and breath sounds normal  No respiratory distress  She has no wheezes  She has no rales  She exhibits no tenderness  Abdominal: Soft  Bowel sounds are normal  She exhibits no distension and no mass  There is tenderness ( at the scar for laparoscopic cholecystectomy) in the periumbilical area  There is no rebound and no guarding  Musculoskeletal: Normal range of motion  She exhibits no edema, tenderness or deformity  Lymphadenopathy:     She has no cervical adenopathy  Neurological: She is alert and oriented to person, place, and time  She has normal reflexes  No cranial nerve deficit  She exhibits normal muscle tone  Coordination normal    Skin: Skin is warm and dry  No rash noted  She is not diaphoretic  No erythema  No pallor  Psychiatric: She has a normal mood and affect   Her behavior is normal          Ancillary Orders on 12/13/2019   Component Date Value Ref Range Status    WBC 01/02/2020 4 85  4 31 - 10 16 Thousand/uL Final    RBC 01/02/2020 4 54  3 81 - 5 12 Million/uL Final    Hemoglobin 01/02/2020 12 2  11 5 - 15 4 g/dL Final    Hematocrit 01/02/2020 39 1  34 8 - 46 1 % Final    MCV 01/02/2020 86  82 - 98 fL Final    MCH 01/02/2020 26 9  26 8 - 34 3 pg Final    MCHC 01/02/2020 31 2* 31 4 - 37 4 g/dL Final    RDW 01/02/2020 13 4  11 6 - 15 1 % Final    MPV 01/02/2020 12 6  8 9 - 12 7 fL Final    Platelets 27/86/2962 205  149 - 390 Thousands/uL Final    nRBC 01/02/2020 0  /100 WBCs Final    Neutrophils Relative 01/02/2020 52  43 - 75 % Final    Immat GRANS % 01/02/2020 0  0 - 2 % Final    Lymphocytes Relative 01/02/2020 34  14 - 44 % Final    Monocytes Relative 01/02/2020 11  4 - 12 % Final    Eosinophils Relative 01/02/2020 2  0 - 6 % Final    Basophils Relative 01/02/2020 1  0 - 1 % Final    Neutrophils Absolute 01/02/2020 2 55  1 85 - 7 62 Thousands/µL Final    Immature Grans Absolute 01/02/2020 0 01  0 00 - 0 20 Thousand/uL Final    Lymphocytes Absolute 01/02/2020 1 63  0 60 - 4 47 Thousands/µL Final    Monocytes Absolute 01/02/2020 0 52  0 17 - 1 22 Thousand/µL Final    Eosinophils Absolute 01/02/2020 0 11  0 00 - 0 61 Thousand/µL Final    Basophils Absolute 01/02/2020 0 03  0 00 - 0 10 Thousands/µL Final    Sodium 01/02/2020 140  136 - 145 mmol/L Final    Potassium 01/02/2020 3 8  3 5 - 5 3 mmol/L Final    Chloride 01/02/2020 108  100 - 108 mmol/L Final    CO2 01/02/2020 27  21 - 32 mmol/L Final    ANION GAP 01/02/2020 5  4 - 13 mmol/L Final    BUN 01/02/2020 11  5 - 25 mg/dL Final    Creatinine 01/02/2020 0 76  0 60 - 1 30 mg/dL Final    Standardized to IDMS reference method    Glucose, Fasting 01/02/2020 82  65 - 99 mg/dL Final      Specimen collection should occur prior to Sulfasalazine administration due to the potential for falsely depressed results  Specimen collection should occur prior to Sulfapyridine administration due to the potential for falsely elevated results   Calcium 01/02/2020 9 5  8 3 - 10 1 mg/dL Final    AST 01/02/2020 19  5 - 45 U/L Final      Specimen collection should occur prior to Sulfasalazine administration due to the potential for falsely depressed results       ALT 01/02/2020 44  12 - 78 U/L Final      Specimen collection should occur prior to Sulfasalazine and/or Sulfapyridine administration due to the potential for falsely depressed results       Alkaline Phosphatase 01/02/2020 57  46 - 116 U/L Final    Total Protein 01/02/2020 6 7  6 4 - 8 2 g/dL Final    Albumin 01/02/2020 3 4* 3 5 - 5 0 g/dL Final    Total Bilirubin 01/02/2020 0 20  0 20 - 1 00 mg/dL Final    eGFR 01/02/2020 109  ml/min/1 73sq m Final   Ancillary Orders on 12/06/2019   Component Date Value Ref Range Status    WBC 12/18/2019 5 52  4 31 - 10 16 Thousand/uL Final    RBC 12/18/2019 4 35  3 81 - 5 12 Million/uL Final    Hemoglobin 12/18/2019 12 3  11 5 - 15 4 g/dL Final    Hematocrit 12/18/2019 38 2  34 8 - 46 1 % Final    MCV 12/18/2019 88  82 - 98 fL Final    MCH 12/18/2019 28 3  26 8 - 34 3 pg Final    MCHC 12/18/2019 32 2  31 4 - 37 4 g/dL Final    RDW 12/18/2019 13 3  11 6 - 15 1 % Final    MPV 12/18/2019 12 5  8 9 - 12 7 fL Final    Platelets 78/20/9995 205  149 - 390 Thousands/uL Final    nRBC 12/18/2019 0  /100 WBCs Final    Neutrophils Relative 12/18/2019 55  43 - 75 % Final    Immat GRANS % 12/18/2019 0  0 - 2 % Final    Lymphocytes Relative 12/18/2019 31  14 - 44 % Final    Monocytes Relative 12/18/2019 11  4 - 12 % Final    Eosinophils Relative 12/18/2019 2  0 - 6 % Final    Basophils Relative 12/18/2019 1  0 - 1 % Final    Neutrophils Absolute 12/18/2019 3 04  1 85 - 7 62 Thousands/µL Final    Immature Grans Absolute 12/18/2019 0 01  0 00 - 0 20 Thousand/uL Final    Lymphocytes Absolute 12/18/2019 1 72  0 60 - 4 47 Thousands/µL Final    Monocytes Absolute 12/18/2019 0 62  0 17 - 1 22 Thousand/µL Final    Eosinophils Absolute 12/18/2019 0 09  0 00 - 0 61 Thousand/µL Final    Basophils Absolute 12/18/2019 0 04  0 00 - 0 10 Thousands/µL Final    Sodium 12/18/2019 139  136 - 145 mmol/L Final    Potassium 12/18/2019 3 7  3 5 - 5 3 mmol/L Final    Chloride 12/18/2019 108  100 - 108 mmol/L Final    CO2 12/18/2019 25  21 - 32 mmol/L Final    ANION GAP 12/18/2019 6  4 - 13 mmol/L Final    BUN 12/18/2019 11  5 - 25 mg/dL Final    Creatinine 12/18/2019 0 68  0 60 - 1 30 mg/dL Final    Standardized to IDMS reference method    Glucose, Fasting 12/18/2019 79  65 - 99 mg/dL Final      Specimen collection should occur prior to Sulfasalazine administration due to the potential for falsely depressed results  Specimen collection should occur prior to Sulfapyridine administration due to the potential for falsely elevated results   Calcium 12/18/2019 9 2  8 3 - 10 1 mg/dL Final    AST 12/18/2019 12  5 - 45 U/L Final      Specimen collection should occur prior to Sulfasalazine administration due to the potential for falsely depressed results   ALT 12/18/2019 30  12 - 78 U/L Final      Specimen collection should occur prior to Sulfasalazine and/or Sulfapyridine administration due to the potential for falsely depressed results       Alkaline Phosphatase 12/18/2019 60  46 - 116 U/L Final    Total Protein 12/18/2019 6 3* 6 4 - 8 2 g/dL Final    Albumin 12/18/2019 3 2* 3 5 - 5 0 g/dL Final    Total Bilirubin 12/18/2019 0 22  0 20 - 1 00 mg/dL Final    eGFR 12/18/2019 121  ml/min/1 73sq m Final   Ancillary Orders on 11/29/2019   Component Date Value Ref Range Status    WBC 12/04/2019 5 64  4 31 - 10 16 Thousand/uL Final    RBC 12/04/2019 4 30  3 81 - 5 12 Million/uL Final    Hemoglobin 12/04/2019 12 1  11 5 - 15 4 g/dL Final    Hematocrit 12/04/2019 39 5  34 8 - 46 1 % Final    MCV 12/04/2019 92  82 - 98 fL Final    MCH 12/04/2019 28 1  26 8 - 34 3 pg Final    MCHC 12/04/2019 30 6* 31 4 - 37 4 g/dL Final    RDW 12/04/2019 13 5  11 6 - 15 1 % Final    MPV 12/04/2019 12 6  8 9 - 12 7 fL Final    Platelets 38/16/6925 212  149 - 390 Thousands/uL Final    nRBC 12/04/2019 0  /100 WBCs Final    Neutrophils Relative 12/04/2019 55  43 - 75 % Final    Immat GRANS % 12/04/2019 0  0 - 2 % Final    Lymphocytes Relative 12/04/2019 33  14 - 44 % Final    Monocytes Relative 12/04/2019 10  4 - 12 % Final    Eosinophils Relative 12/04/2019 1  0 - 6 % Final    Basophils Relative 12/04/2019 1  0 - 1 % Final    Neutrophils Absolute 12/04/2019 3 12  1 85 - 7 62 Thousands/µL Final    Immature Grans Absolute 12/04/2019 0 02  0 00 - 0 20 Thousand/uL Final    Lymphocytes Absolute 12/04/2019 1 85  0 60 - 4 47 Thousands/µL Final    Monocytes Absolute 12/04/2019 0 55  0 17 - 1 22 Thousand/µL Final    Eosinophils Absolute 12/04/2019 0 07  0 00 - 0 61 Thousand/µL Final    Basophils Absolute 12/04/2019 0 03  0 00 - 0 10 Thousands/µL Final    Sodium 12/04/2019 141  136 - 145 mmol/L Final    Potassium 12/04/2019 3 8  3 5 - 5 3 mmol/L Final    Chloride 12/04/2019 107  100 - 108 mmol/L Final    CO2 12/04/2019 27  21 - 32 mmol/L Final    ANION GAP 12/04/2019 7  4 - 13 mmol/L Final    BUN 12/04/2019 12  5 - 25 mg/dL Final    Creatinine 12/04/2019 0 73  0 60 - 1 30 mg/dL Final    Standardized to IDMS reference method    Glucose, Fasting 12/04/2019 80  65 - 99 mg/dL Final      Specimen collection should occur prior to Sulfasalazine administration due to the potential for falsely depressed results  Specimen collection should occur prior to Sulfapyridine administration due to the potential for falsely elevated results   Calcium 12/04/2019 9 2  8 3 - 10 1 mg/dL Final    AST 12/04/2019 13  5 - 45 U/L Final      Specimen collection should occur prior to Sulfasalazine administration due to the potential for falsely depressed results   ALT 12/04/2019 27  12 - 78 U/L Final      Specimen collection should occur prior to Sulfasalazine and/or Sulfapyridine administration due to the potential for falsely depressed results       Alkaline Phosphatase 12/04/2019 58  46 - 116 U/L Final    Total Protein 12/04/2019 6 6  6 4 - 8 2 g/dL Final    Albumin 12/04/2019 3 7  3 5 - 5 0 g/dL Final    Total Bilirubin 12/04/2019 0 18* 0 20 - 1 00 mg/dL Final    eGFR 12/04/2019 114  ml/min/1 73sq m Final   Ancillary Orders on 11/22/2019   Component Date Value Ref Range Status    WBC 11/25/2019 6 47  4 31 - 10 16 Thousand/uL Final    RBC 11/25/2019 4 31  3 81 - 5 12 Million/uL Final    Hemoglobin 11/25/2019 12 5  11 5 - 15 4 g/dL Final    Hematocrit 11/25/2019 39 7  34 8 - 46 1 % Final    MCV 11/25/2019 92  82 - 98 fL Final    MCH 11/25/2019 29 0  26 8 - 34 3 pg Final    MCHC 11/25/2019 31 5  31 4 - 37 4 g/dL Final    RDW 11/25/2019 13 3  11 6 - 15 1 % Final    MPV 11/25/2019 13 0* 8 9 - 12 7 fL Final    Platelets 47/47/4066 205  149 - 390 Thousands/uL Final    nRBC 11/25/2019 0  /100 WBCs Final    Neutrophils Relative 11/25/2019 55  43 - 75 % Final    Immat GRANS % 11/25/2019 1  0 - 2 % Final    Lymphocytes Relative 11/25/2019 33  14 - 44 % Final    Monocytes Relative 11/25/2019 9  4 - 12 % Final    Eosinophils Relative 11/25/2019 1  0 - 6 % Final    Basophils Relative 11/25/2019 1  0 - 1 % Final    Neutrophils Absolute 11/25/2019 3 67  1 85 - 7 62 Thousands/µL Final    Immature Grans Absolute 11/25/2019 0 03  0 00 - 0 20 Thousand/uL Final    Lymphocytes Absolute 11/25/2019 2 10  0 60 - 4 47 Thousands/µL Final    Monocytes Absolute 11/25/2019 0 57  0 17 - 1 22 Thousand/µL Final    Eosinophils Absolute 11/25/2019 0 07  0 00 - 0 61 Thousand/µL Final    Basophils Absolute 11/25/2019 0 03  0 00 - 0 10 Thousands/µL Final    Sodium 11/25/2019 140  136 - 145 mmol/L Final    Potassium 11/25/2019 3 5  3 5 - 5 3 mmol/L Final    Chloride 11/25/2019 107  100 - 108 mmol/L Final    CO2 11/25/2019 24  21 - 32 mmol/L Final    ANION GAP 11/25/2019 9  4 - 13 mmol/L Final    BUN 11/25/2019 12  5 - 25 mg/dL Final    Creatinine 11/25/2019 0 70  0 60 - 1 30 mg/dL Final    Standardized to IDMS reference method    Glucose, Fasting 11/25/2019 74  65 - 99 mg/dL Final      Specimen collection should occur prior to Sulfasalazine administration due to the potential for falsely depressed results  Specimen collection should occur prior to Sulfapyridine administration due to the potential for falsely elevated results   Calcium 11/25/2019 9 0  8 3 - 10 1 mg/dL Final    AST 11/25/2019 12  5 - 45 U/L Final      Specimen collection should occur prior to Sulfasalazine administration due to the potential for falsely depressed results   ALT 11/25/2019 28  12 - 78 U/L Final      Specimen collection should occur prior to Sulfasalazine and/or Sulfapyridine administration due to the potential for falsely depressed results       Alkaline Phosphatase 11/25/2019 54  46 - 116 U/L Final    Total Protein 11/25/2019 6 5  6 4 - 8 2 g/dL Final    Albumin 11/25/2019 3 3* 3 5 - 5 0 g/dL Final    Total Bilirubin 11/25/2019 0 17* 0 20 - 1 00 mg/dL Final    eGFR 11/25/2019 120  ml/min/1 73sq m Final   Ancillary Orders on 11/15/2019   Component Date Value Ref Range Status    WBC 11/18/2019 6 29  4 31 - 10 16 Thousand/uL Final    RBC 11/18/2019 4 31  3 81 - 5 12 Million/uL Final    Hemoglobin 11/18/2019 12 9  11 5 - 15 4 g/dL Final    Hematocrit 11/18/2019 40 5  34 8 - 46 1 % Final    MCV 11/18/2019 94  82 - 98 fL Final    MCH 11/18/2019 29 9  26 8 - 34 3 pg Final    MCHC 11/18/2019 31 9  31 4 - 37 4 g/dL Final    RDW 11/18/2019 13 5  11 6 - 15 1 % Final    MPV 11/18/2019 12 2  8 9 - 12 7 fL Final    Platelets 42/31/2405 242  149 - 390 Thousands/uL Final    nRBC 11/18/2019 0  /100 WBCs Final    Neutrophils Relative 11/18/2019 55  43 - 75 % Final    Immat GRANS % 11/18/2019 1  0 - 2 % Final    Lymphocytes Relative 11/18/2019 33  14 - 44 % Final    Monocytes Relative 11/18/2019 9  4 - 12 % Final    Eosinophils Relative 11/18/2019 1  0 - 6 % Final    Basophils Relative 11/18/2019 1  0 - 1 % Final    Neutrophils Absolute 11/18/2019 3 49  1 85 - 7 62 Thousands/µL Final    Immature Grans Absolute 11/18/2019 0 04  0 00 - 0 20 Thousand/uL Final    Lymphocytes Absolute 11/18/2019 2 10  0 60 - 4 47 Thousands/µL Final    Monocytes Absolute 11/18/2019 0 57  0 17 - 1 22 Thousand/µL Final    Eosinophils Absolute 11/18/2019 0 06  0 00 - 0 61 Thousand/µL Final    Basophils Absolute 11/18/2019 0 03  0 00 - 0 10 Thousands/µL Final    Sodium 11/18/2019 140  136 - 145 mmol/L Final    Potassium 11/18/2019 3 7  3 5 - 5 3 mmol/L Final    Chloride 11/18/2019 107  100 - 108 mmol/L Final    CO2 11/18/2019 26  21 - 32 mmol/L Final    ANION GAP 11/18/2019 7  4 - 13 mmol/L Final    BUN 11/18/2019 9  5 - 25 mg/dL Final    Creatinine 11/18/2019 0 72  0 60 - 1 30 mg/dL Final    Standardized to IDMS reference method    Glucose, Fasting 11/18/2019 82  65 - 99 mg/dL Final      Specimen collection should occur prior to Sulfasalazine administration due to the potential for falsely depressed results  Specimen collection should occur prior to Sulfapyridine administration due to the potential for falsely elevated results   Calcium 11/18/2019 9 2  8 3 - 10 1 mg/dL Final    AST 11/18/2019 10  5 - 45 U/L Final      Specimen collection should occur prior to Sulfasalazine administration due to the potential for falsely depressed results   ALT 11/18/2019 22  12 - 78 U/L Final      Specimen collection should occur prior to Sulfasalazine and/or Sulfapyridine administration due to the potential for falsely depressed results       Alkaline Phosphatase 11/18/2019 54  46 - 116 U/L Final    Total Protein 11/18/2019 7 0  6 4 - 8 2 g/dL Final    Albumin 11/18/2019 3 5  3 5 - 5 0 g/dL Final    Total Bilirubin 11/18/2019 0 20  0 20 - 1 00 mg/dL Final    eGFR 11/18/2019 116  ml/min/1 73sq m Final   Ancillary Orders on 11/08/2019   Component Date Value Ref Range Status    WBC 11/11/2019 6 68  4 31 - 10 16 Thousand/uL Final    RBC 11/11/2019 4 04  3 81 - 5 12 Million/uL Final    Hemoglobin 11/11/2019 12 4  11 5 - 15 4 g/dL Final    Hematocrit 11/11/2019 39 3  34 8 - 46 1 % Final    MCV 11/11/2019 97  82 - 98 fL Final    MCH 11/11/2019 30 7  26 8 - 34 3 pg Final    MCHC 11/11/2019 31 6  31 4 - 37 4 g/dL Final    RDW 11/11/2019 14 0  11 6 - 15 1 % Final    MPV 11/11/2019 12 0  8 9 - 12 7 fL Final    Platelets 02/69/4660 254  149 - 390 Thousands/uL Final    nRBC 11/11/2019 0  /100 WBCs Final    Neutrophils Relative 11/11/2019 56  43 - 75 % Final    Immat GRANS % 11/11/2019 0  0 - 2 % Final    Lymphocytes Relative 11/11/2019 34  14 - 44 % Final    Monocytes Relative 11/11/2019 9  4 - 12 % Final    Eosinophils Relative 11/11/2019 1  0 - 6 % Final    Basophils Relative 11/11/2019 0  0 - 1 % Final    Neutrophils Absolute 11/11/2019 3 65  1 85 - 7 62 Thousands/µL Final    Immature Grans Absolute 11/11/2019 0 02  0 00 - 0 20 Thousand/uL Final    Lymphocytes Absolute 11/11/2019 2 29  0 60 - 4 47 Thousands/µL Final    Monocytes Absolute 11/11/2019 0 63  0 17 - 1 22 Thousand/µL Final    Eosinophils Absolute 11/11/2019 0 06  0 00 - 0 61 Thousand/µL Final    Basophils Absolute 11/11/2019 0 03  0 00 - 0 10 Thousands/µL Final    Sodium 11/11/2019 140  136 - 145 mmol/L Final    Potassium 11/11/2019 3 8  3 5 - 5 3 mmol/L Final    Chloride 11/11/2019 106  100 - 108 mmol/L Final    CO2 11/11/2019 27  21 - 32 mmol/L Final    ANION GAP 11/11/2019 7  4 - 13 mmol/L Final    BUN 11/11/2019 10  5 - 25 mg/dL Final    Creatinine 11/11/2019 0 74  0 60 - 1 30 mg/dL Final    Standardized to IDMS reference method    Glucose, Fasting 11/11/2019 82  65 - 99 mg/dL Final      Specimen collection should occur prior to Sulfasalazine administration due to the potential for falsely depressed results  Specimen collection should occur prior to Sulfapyridine administration due to the potential for falsely elevated results      Calcium 11/11/2019 9 2  8 3 - 10 1 mg/dL Final    AST 11/11/2019 9  5 - 45 U/L Final      Specimen collection should occur prior to Sulfasalazine administration due to the potential for falsely depressed results   ALT 11/11/2019 24  12 - 78 U/L Final      Specimen collection should occur prior to Sulfasalazine and/or Sulfapyridine administration due to the potential for falsely depressed results       Alkaline Phosphatase 11/11/2019 48  46 - 116 U/L Final    Total Protein 11/11/2019 7 0  6 4 - 8 2 g/dL Final    Albumin 11/11/2019 3 5  3 5 - 5 0 g/dL Final    Total Bilirubin 11/11/2019 0 22  0 20 - 1 00 mg/dL Final    eGFR 11/11/2019 112  ml/min/1 73sq m Final   Ancillary Orders on 11/01/2019   Component Date Value Ref Range Status    WBC 11/04/2019 6 33  4 31 - 10 16 Thousand/uL Final    RBC 11/04/2019 3 82  3 81 - 5 12 Million/uL Final    Hemoglobin 11/04/2019 11 9  11 5 - 15 4 g/dL Final    Hematocrit 11/04/2019 38 1  34 8 - 46 1 % Final    MCV 11/04/2019 100* 82 - 98 fL Final    MCH 11/04/2019 31 2  26 8 - 34 3 pg Final    MCHC 11/04/2019 31 2* 31 4 - 37 4 g/dL Final    RDW 11/04/2019 14 7  11 6 - 15 1 % Final    MPV 11/04/2019 11 9  8 9 - 12 7 fL Final    Platelets 46/72/9725 207  149 - 390 Thousands/uL Final    nRBC 11/04/2019 0  /100 WBCs Final    Neutrophils Relative 11/04/2019 50  43 - 75 % Final    Immat GRANS % 11/04/2019 1  0 - 2 % Final    Lymphocytes Relative 11/04/2019 39  14 - 44 % Final    Monocytes Relative 11/04/2019 8  4 - 12 % Final    Eosinophils Relative 11/04/2019 1  0 - 6 % Final    Basophils Relative 11/04/2019 1  0 - 1 % Final    Neutrophils Absolute 11/04/2019 3 27  1 85 - 7 62 Thousands/µL Final    Immature Grans Absolute 11/04/2019 0 04  0 00 - 0 20 Thousand/uL Final    Lymphocytes Absolute 11/04/2019 2 44  0 60 - 4 47 Thousands/µL Final    Monocytes Absolute 11/04/2019 0 50  0 17 - 1 22 Thousand/µL Final    Eosinophils Absolute 11/04/2019 0 05  0 00 - 0 61 Thousand/µL Final    Basophils Absolute 11/04/2019 0 03  0 00 - 0 10 Thousands/µL Final    Sodium 11/04/2019 140  136 - 145 mmol/L Final    Potassium 11/04/2019 3 7  3 5 - 5 3 mmol/L Final    Chloride 11/04/2019 108  100 - 108 mmol/L Final    CO2 11/04/2019 26  21 - 32 mmol/L Final    ANION GAP 11/04/2019 6  4 - 13 mmol/L Final    BUN 11/04/2019 14  5 - 25 mg/dL Final    Creatinine 11/04/2019 0 74  0 60 - 1 30 mg/dL Final    Standardized to IDMS reference method    Glucose, Fasting 11/04/2019 74  65 - 99 mg/dL Final      Specimen collection should occur prior to Sulfasalazine administration due to the potential for falsely depressed results  Specimen collection should occur prior to Sulfapyridine administration due to the potential for falsely elevated results   Calcium 11/04/2019 9 0  8 3 - 10 1 mg/dL Final    AST 11/04/2019 12  5 - 45 U/L Final      Specimen collection should occur prior to Sulfasalazine administration due to the potential for falsely depressed results   ALT 11/04/2019 27  12 - 78 U/L Final      Specimen collection should occur prior to Sulfasalazine and/or Sulfapyridine administration due to the potential for falsely depressed results       Alkaline Phosphatase 11/04/2019 47  46 - 116 U/L Final    Total Protein 11/04/2019 7 0  6 4 - 8 2 g/dL Final    Albumin 11/04/2019 3 5  3 5 - 5 0 g/dL Final    Total Bilirubin 11/04/2019 0 21  0 20 - 1 00 mg/dL Final    eGFR 11/04/2019 112  ml/min/1 73sq m Final   Orders Only on 10/28/2019   Component Date Value Ref Range Status    WBC 10/31/2019 5 51  4 31 - 10 16 Thousand/uL Final    RBC 10/31/2019 3 69* 3 81 - 5 12 Million/uL Final    Hemoglobin 10/31/2019 11 7  11 5 - 15 4 g/dL Final    Hematocrit 10/31/2019 36 8  34 8 - 46 1 % Final    MCV 10/31/2019 100* 82 - 98 fL Final    MCH 10/31/2019 31 7  26 8 - 34 3 pg Final    MCHC 10/31/2019 31 8  31 4 - 37 4 g/dL Final    RDW 10/31/2019 15 5* 11 6 - 15 1 % Final    MPV 10/31/2019 11 9  8 9 - 12 7 fL Final    Platelets 48/79/2225 159  149 - 390 Thousands/uL Final    nRBC 10/31/2019 0  /100 WBCs Final    Neutrophils Relative 10/31/2019 54  43 - 75 % Final    Immat GRANS % 10/31/2019 1  0 - 2 % Final    Lymphocytes Relative 10/31/2019 35  14 - 44 % Final    Monocytes Relative 10/31/2019 8  4 - 12 % Final    Eosinophils Relative 10/31/2019 1  0 - 6 % Final    Basophils Relative 10/31/2019 1  0 - 1 % Final    Neutrophils Absolute 10/31/2019 3 03  1 85 - 7 62 Thousands/µL Final    Immature Grans Absolute 10/31/2019 0 04  0 00 - 0 20 Thousand/uL Final    Lymphocytes Absolute 10/31/2019 1 90  0 60 - 4 47 Thousands/µL Final    Monocytes Absolute 10/31/2019 0 46  0 17 - 1 22 Thousand/µL Final    Eosinophils Absolute 10/31/2019 0 05  0 00 - 0 61 Thousand/µL Final    Basophils Absolute 10/31/2019 0 03  0 00 - 0 10 Thousands/µL Final    Sodium 10/31/2019 142  136 - 145 mmol/L Final    Potassium 10/31/2019 3 8  3 5 - 5 3 mmol/L Final    Chloride 10/31/2019 106  100 - 108 mmol/L Final    CO2 10/31/2019 28  21 - 32 mmol/L Final    ANION GAP 10/31/2019 8  4 - 13 mmol/L Final    BUN 10/31/2019 15  5 - 25 mg/dL Final    Creatinine 10/31/2019 0 78  0 60 - 1 30 mg/dL Final    Standardized to IDMS reference method    Glucose, Fasting 10/31/2019 74  65 - 99 mg/dL Final      Specimen collection should occur prior to Sulfasalazine administration due to the potential for falsely depressed results  Specimen collection should occur prior to Sulfapyridine administration due to the potential for falsely elevated results   Calcium 10/31/2019 9 0  8 3 - 10 1 mg/dL Final    AST 10/31/2019 11  5 - 45 U/L Final      Specimen collection should occur prior to Sulfasalazine administration due to the potential for falsely depressed results   ALT 10/31/2019 25  12 - 78 U/L Final      Specimen collection should occur prior to Sulfasalazine and/or Sulfapyridine administration due to the potential for falsely depressed results       Alkaline Phosphatase 10/31/2019 48  46 - 116 U/L Final    Total Protein 10/31/2019 7 1  6 4 - 8 2 g/dL Final    Albumin 10/31/2019 3 7  3 5 - 5 0 g/dL Final    Total Bilirubin 10/31/2019 0 29  0 20 - 1 00 mg/dL Final    eGFR 10/31/2019 105  ml/min/1 73sq m Final   Appointment on 10/28/2019   Component Date Value Ref Range Status    WBC 10/28/2019 5 78  4 31 - 10 16 Thousand/uL Final    RBC 10/28/2019 3 85  3 81 - 5 12 Million/uL Final    Hemoglobin 10/28/2019 12 0  11 5 - 15 4 g/dL Final    Hematocrit 10/28/2019 38 6  34 8 - 46 1 % Final    MCV 10/28/2019 100* 82 - 98 fL Final    MCH 10/28/2019 31 2  26 8 - 34 3 pg Final    MCHC 10/28/2019 31 1* 31 4 - 37 4 g/dL Final    RDW 10/28/2019 15 9* 11 6 - 15 1 % Final    MPV 10/28/2019 11 7  8 9 - 12 7 fL Final    Platelets 65/92/5979 173  149 - 390 Thousands/uL Final    nRBC 10/28/2019 0  /100 WBCs Final    Neutrophils Relative 10/28/2019 57  43 - 75 % Final    Immat GRANS % 10/28/2019 1  0 - 2 % Final    Lymphocytes Relative 10/28/2019 31  14 - 44 % Final    Monocytes Relative 10/28/2019 9  4 - 12 % Final    Eosinophils Relative 10/28/2019 1  0 - 6 % Final    Basophils Relative 10/28/2019 1  0 - 1 % Final    Neutrophils Absolute 10/28/2019 3 36  1 85 - 7 62 Thousands/µL Final    Immature Grans Absolute 10/28/2019 0 03  0 00 - 0 20 Thousand/uL Final    Lymphocytes Absolute 10/28/2019 1 79  0 60 - 4 47 Thousands/µL Final    Monocytes Absolute 10/28/2019 0 53  0 17 - 1 22 Thousand/µL Final    Eosinophils Absolute 10/28/2019 0 04  0 00 - 0 61 Thousand/µL Final    Basophils Absolute 10/28/2019 0 03  0 00 - 0 10 Thousands/µL Final    Sodium 10/28/2019 139  136 - 145 mmol/L Final    Potassium 10/28/2019 3 7  3 5 - 5 3 mmol/L Final    Chloride 10/28/2019 108  100 - 108 mmol/L Final    CO2 10/28/2019 24  21 - 32 mmol/L Final    ANION GAP 10/28/2019 7  4 - 13 mmol/L Final    BUN 10/28/2019 15  5 - 25 mg/dL Final    Creatinine 10/28/2019 0 67  0 60 - 1 30 mg/dL Final    Standardized to IDMS reference method    Glucose, Fasting 10/28/2019 77  65 - 99 mg/dL Final      Specimen collection should occur prior to Sulfasalazine administration due to the potential for falsely depressed results  Specimen collection should occur prior to Sulfapyridine administration due to the potential for falsely elevated results   Calcium 10/28/2019 9 0  8 3 - 10 1 mg/dL Final    AST 10/28/2019 13  5 - 45 U/L Final      Specimen collection should occur prior to Sulfasalazine administration due to the potential for falsely depressed results   ALT 10/28/2019 30  12 - 78 U/L Final      Specimen collection should occur prior to Sulfasalazine and/or Sulfapyridine administration due to the potential for falsely depressed results       Alkaline Phosphatase 10/28/2019 46  46 - 116 U/L Final    Total Protein 10/28/2019 7 1  6 4 - 8 2 g/dL Final    Albumin 10/28/2019 3 5  3 5 - 5 0 g/dL Final    Total Bilirubin 10/28/2019 0 28  0 20 - 1 00 mg/dL Final    eGFR 10/28/2019 122  ml/min/1 73sq m Final   Appointment on 10/21/2019   Component Date Value Ref Range Status    WBC 10/21/2019 5 63  4 31 - 10 16 Thousand/uL Final    RBC 10/21/2019 3 40* 3 81 - 5 12 Million/uL Final    Hemoglobin 10/21/2019 10 8* 11 5 - 15 4 g/dL Final    Hematocrit 10/21/2019 34 5* 34 8 - 46 1 % Final    MCV 10/21/2019 102* 82 - 98 fL Final    MCH 10/21/2019 31 8  26 8 - 34 3 pg Final    MCHC 10/21/2019 31 3* 31 4 - 37 4 g/dL Final    RDW 10/21/2019 18 4* 11 6 - 15 1 % Final    MPV 10/21/2019 11 3  8 9 - 12 7 fL Final    Platelets 86/52/6066 271  149 - 390 Thousands/uL Final    nRBC 10/21/2019 0  /100 WBCs Final    Neutrophils Relative 10/21/2019 52  43 - 75 % Final    Immat GRANS % 10/21/2019 1  0 - 2 % Final    Lymphocytes Relative 10/21/2019 37  14 - 44 % Final    Monocytes Relative 10/21/2019 9  4 - 12 % Final    Eosinophils Relative 10/21/2019 1  0 - 6 % Final    Basophils Relative 10/21/2019 0  0 - 1 % Final    Neutrophils Absolute 10/21/2019 2 99  1 85 - 7 62 Thousands/µL Final    Immature Grans Absolute 10/21/2019 0 03  0 00 - 0 20 Thousand/uL Final    Lymphocytes Absolute 10/21/2019 2 08  0 60 - 4 47 Thousands/µL Final    Monocytes Absolute 10/21/2019 0 49  0 17 - 1 22 Thousand/µL Final    Eosinophils Absolute 10/21/2019 0 03  0 00 - 0 61 Thousand/µL Final    Basophils Absolute 10/21/2019 0 01  0 00 - 0 10 Thousands/µL Final    Sodium 10/21/2019 139  136 - 145 mmol/L Final    Potassium 10/21/2019 3 5  3 5 - 5 3 mmol/L Final    Chloride 10/21/2019 108  100 - 108 mmol/L Final    CO2 10/21/2019 24  21 - 32 mmol/L Final    ANION GAP 10/21/2019 7  4 - 13 mmol/L Final    BUN 10/21/2019 14  5 - 25 mg/dL Final    Creatinine 10/21/2019 0 78  0 60 - 1 30 mg/dL Final    Standardized to IDMS reference method    Glucose, Fasting 10/21/2019 75  65 - 99 mg/dL Final      Specimen collection should occur prior to Sulfasalazine administration due to the potential for falsely depressed results  Specimen collection should occur prior to Sulfapyridine administration due to the potential for falsely elevated results   Calcium 10/21/2019 9 0  8 3 - 10 1 mg/dL Final    AST 10/21/2019 15  5 - 45 U/L Final      Specimen collection should occur prior to Sulfasalazine administration due to the potential for falsely depressed results   ALT 10/21/2019 38  12 - 78 U/L Final      Specimen collection should occur prior to Sulfasalazine and/or Sulfapyridine administration due to the potential for falsely depressed results   Alkaline Phosphatase 10/21/2019 45* 46 - 116 U/L Final    Total Protein 10/21/2019 7 2  6 4 - 8 2 g/dL Final    Albumin 10/21/2019 3 3* 3 5 - 5 0 g/dL Final    Total Bilirubin 10/21/2019 0 37  0 20 - 1 00 mg/dL Final    eGFR 10/21/2019 105  ml/min/1 73sq m Final   There may be more visits with results that are not included

## 2020-01-17 LAB
C TRACH RRNA SPEC QL NAA+PROBE: NOT DETECTED
CLINICAL INFO: NORMAL
CYTO CVX: NORMAL
DATE PREVIOUS BX: NORMAL
LMP START DATE: NORMAL
N GONORRHOEA RRNA SPEC QL NAA+PROBE: NOT DETECTED
SL AMB PREV. PAP:: NORMAL
SPECIMEN SOURCE CVX/VAG CYTO: NORMAL

## 2020-01-20 ENCOUNTER — TELEPHONE (OUTPATIENT)
Dept: INTERNAL MEDICINE CLINIC | Age: 25
End: 2020-01-20

## 2020-01-20 NOTE — TELEPHONE ENCOUNTER
I called and discussed patient's negative Pap smear, GC chlamydia and genital culture results with her

## 2020-01-30 ENCOUNTER — APPOINTMENT (OUTPATIENT)
Dept: LAB | Age: 25
End: 2020-01-30
Payer: COMMERCIAL

## 2020-03-03 ENCOUNTER — APPOINTMENT (OUTPATIENT)
Dept: LAB | Age: 25
End: 2020-03-03
Payer: COMMERCIAL

## 2020-03-04 DIAGNOSIS — D69.6 THROMBOCYTOPENIA (HCC): Primary | ICD-10-CM

## 2020-06-11 ENCOUNTER — OFFICE VISIT (OUTPATIENT)
Dept: INTERNAL MEDICINE CLINIC | Age: 25
End: 2020-06-11
Payer: COMMERCIAL

## 2020-06-11 VITALS
HEART RATE: 99 BPM | HEIGHT: 66 IN | TEMPERATURE: 99.6 F | SYSTOLIC BLOOD PRESSURE: 116 MMHG | OXYGEN SATURATION: 99 % | BODY MASS INDEX: 43.2 KG/M2 | WEIGHT: 268.8 LBS | DIASTOLIC BLOOD PRESSURE: 62 MMHG

## 2020-06-11 DIAGNOSIS — R10.13 EPIGASTRIC PAIN: ICD-10-CM

## 2020-06-11 DIAGNOSIS — K21.9 GASTROESOPHAGEAL REFLUX DISEASE WITHOUT ESOPHAGITIS: ICD-10-CM

## 2020-06-11 DIAGNOSIS — R10.12 LEFT UPPER QUADRANT ABDOMINAL PAIN: Primary | ICD-10-CM

## 2020-06-11 DIAGNOSIS — Z91.89: ICD-10-CM

## 2020-06-11 PROCEDURE — 1036F TOBACCO NON-USER: CPT | Performed by: INTERNAL MEDICINE

## 2020-06-11 PROCEDURE — 3008F BODY MASS INDEX DOCD: CPT | Performed by: INTERNAL MEDICINE

## 2020-06-11 PROCEDURE — 99214 OFFICE O/P EST MOD 30 MIN: CPT | Performed by: INTERNAL MEDICINE

## 2020-06-11 RX ORDER — FAMOTIDINE 20 MG/1
20 TABLET, FILM COATED ORAL 2 TIMES DAILY
Qty: 60 TABLET | Refills: 1 | Status: SHIPPED | OUTPATIENT
Start: 2020-06-11 | End: 2021-02-10 | Stop reason: CLARIF

## 2020-06-16 ENCOUNTER — APPOINTMENT (OUTPATIENT)
Dept: LAB | Age: 25
End: 2020-06-16
Payer: COMMERCIAL

## 2020-06-16 DIAGNOSIS — Z91.89: ICD-10-CM

## 2020-06-16 DIAGNOSIS — R10.13 EPIGASTRIC PAIN: ICD-10-CM

## 2020-06-16 LAB
ALBUMIN SERPL BCP-MCNC: 3.6 G/DL (ref 3.5–5)
ALP SERPL-CCNC: 81 U/L (ref 46–116)
ALT SERPL W P-5'-P-CCNC: 94 U/L (ref 12–78)
ANION GAP SERPL CALCULATED.3IONS-SCNC: 7 MMOL/L (ref 4–13)
AST SERPL W P-5'-P-CCNC: 26 U/L (ref 5–45)
BASOPHILS # BLD AUTO: 0.02 THOUSANDS/ΜL (ref 0–0.1)
BASOPHILS NFR BLD AUTO: 0 % (ref 0–1)
BILIRUB SERPL-MCNC: 0.49 MG/DL (ref 0.2–1)
BUN SERPL-MCNC: 11 MG/DL (ref 5–25)
CALCIUM SERPL-MCNC: 9.2 MG/DL (ref 8.3–10.1)
CHLORIDE SERPL-SCNC: 108 MMOL/L (ref 100–108)
CO2 SERPL-SCNC: 24 MMOL/L (ref 21–32)
CREAT SERPL-MCNC: 0.78 MG/DL (ref 0.6–1.3)
EOSINOPHIL # BLD AUTO: 0.07 THOUSAND/ΜL (ref 0–0.61)
EOSINOPHIL NFR BLD AUTO: 1 % (ref 0–6)
ERYTHROCYTE [DISTWIDTH] IN BLOOD BY AUTOMATED COUNT: 13.8 % (ref 11.6–15.1)
GFR SERPL CREATININE-BSD FRML MDRD: 105 ML/MIN/1.73SQ M
GLUCOSE P FAST SERPL-MCNC: 91 MG/DL (ref 65–99)
HCT VFR BLD AUTO: 41.7 % (ref 34.8–46.1)
HGB BLD-MCNC: 13.1 G/DL (ref 11.5–15.4)
IMM GRANULOCYTES # BLD AUTO: 0.01 THOUSAND/UL (ref 0–0.2)
IMM GRANULOCYTES NFR BLD AUTO: 0 % (ref 0–2)
LYMPHOCYTES # BLD AUTO: 1.72 THOUSANDS/ΜL (ref 0.6–4.47)
LYMPHOCYTES NFR BLD AUTO: 34 % (ref 14–44)
MCH RBC QN AUTO: 26.5 PG (ref 26.8–34.3)
MCHC RBC AUTO-ENTMCNC: 31.4 G/DL (ref 31.4–37.4)
MCV RBC AUTO: 84 FL (ref 82–98)
MONOCYTES # BLD AUTO: 0.54 THOUSAND/ΜL (ref 0.17–1.22)
MONOCYTES NFR BLD AUTO: 11 % (ref 4–12)
NEUTROPHILS # BLD AUTO: 2.69 THOUSANDS/ΜL (ref 1.85–7.62)
NEUTS SEG NFR BLD AUTO: 54 % (ref 43–75)
NRBC BLD AUTO-RTO: 0 /100 WBCS
PLATELET # BLD AUTO: 184 THOUSANDS/UL (ref 149–390)
PMV BLD AUTO: 13.4 FL (ref 8.9–12.7)
POTASSIUM SERPL-SCNC: 3.7 MMOL/L (ref 3.5–5.3)
PROT SERPL-MCNC: 7 G/DL (ref 6.4–8.2)
RBC # BLD AUTO: 4.95 MILLION/UL (ref 3.81–5.12)
SODIUM SERPL-SCNC: 139 MMOL/L (ref 136–145)
WBC # BLD AUTO: 5.05 THOUSAND/UL (ref 4.31–10.16)

## 2020-06-16 PROCEDURE — 80053 COMPREHEN METABOLIC PANEL: CPT

## 2020-06-16 PROCEDURE — 85025 COMPLETE CBC W/AUTO DIFF WBC: CPT | Performed by: INTERNAL MEDICINE

## 2020-06-16 PROCEDURE — 87338 HPYLORI STOOL AG IA: CPT

## 2020-06-16 PROCEDURE — 36415 COLL VENOUS BLD VENIPUNCTURE: CPT | Performed by: INTERNAL MEDICINE

## 2020-06-17 LAB — H PYLORI AG STL QL IA: NEGATIVE

## 2020-06-18 ENCOUNTER — TELEPHONE (OUTPATIENT)
Dept: INTERNAL MEDICINE CLINIC | Age: 25
End: 2020-06-18

## 2020-06-18 ENCOUNTER — TELEPHONE (OUTPATIENT)
Dept: INTERNAL MEDICINE CLINIC | Facility: CLINIC | Age: 25
End: 2020-06-18

## 2020-06-18 DIAGNOSIS — R10.12 LEFT UPPER QUADRANT ABDOMINAL PAIN: Primary | ICD-10-CM

## 2020-06-18 DIAGNOSIS — R10.13 EPIGASTRIC PAIN: ICD-10-CM

## 2020-06-25 ENCOUNTER — HOSPITAL ENCOUNTER (OUTPATIENT)
Dept: RADIOLOGY | Age: 25
Discharge: HOME | End: 2020-06-25
Payer: COMMERCIAL

## 2020-06-25 DIAGNOSIS — R10.13 EPIGASTRIC PAIN: ICD-10-CM

## 2020-06-25 DIAGNOSIS — R10.12 LEFT UPPER QUADRANT ABDOMINAL PAIN: ICD-10-CM

## 2020-06-25 PROCEDURE — 74160 CT ABDOMEN W/CONTRAST: CPT

## 2020-06-25 RX ADMIN — IOHEXOL 100 ML: 350 INJECTION, SOLUTION INTRAVENOUS at 08:24

## 2020-06-26 ENCOUNTER — TELEPHONE (OUTPATIENT)
Dept: INTERNAL MEDICINE CLINIC | Facility: CLINIC | Age: 25
End: 2020-06-26

## 2020-06-26 ENCOUNTER — TELEPHONE (OUTPATIENT)
Dept: INTERNAL MEDICINE CLINIC | Age: 25
End: 2020-06-26

## 2020-06-26 DIAGNOSIS — K52.9 COLITIS: Primary | ICD-10-CM

## 2020-06-26 RX ORDER — CIPROFLOXACIN 500 MG/1
500 TABLET, FILM COATED ORAL EVERY 12 HOURS SCHEDULED
Qty: 10 TABLET | Refills: 0 | Status: SHIPPED | OUTPATIENT
Start: 2020-06-26 | End: 2020-07-01

## 2020-06-26 RX ORDER — METRONIDAZOLE 500 MG/1
500 TABLET ORAL EVERY 8 HOURS SCHEDULED
Qty: 15 TABLET | Refills: 0 | Status: SHIPPED | OUTPATIENT
Start: 2020-06-26 | End: 2020-07-01

## 2020-06-29 ENCOUNTER — TELEPHONE (OUTPATIENT)
Dept: OTHER | Facility: HOSPITAL | Age: 25
End: 2020-06-29

## 2020-06-29 ENCOUNTER — TELEPHONE (OUTPATIENT)
Dept: INTERNAL MEDICINE CLINIC | Age: 25
End: 2020-06-29

## 2020-06-29 NOTE — TELEPHONE ENCOUNTER
Dr Deena Concepcion -     This patient stopped by the office and wanted to let you know that she  stopped taking  Cipro and Flagyl yesterday  because it was giving her headaches, nausea, diahrrea and vomitting - started then on Friday and then on Saturday  She would like to know what else she can take and why this would happen to her

## 2020-07-08 DIAGNOSIS — R10.12 LEFT UPPER QUADRANT ABDOMINAL PAIN: ICD-10-CM

## 2020-07-08 DIAGNOSIS — R10.13 EPIGASTRIC PAIN: ICD-10-CM

## 2020-07-08 DIAGNOSIS — K52.9 COLITIS: Primary | ICD-10-CM

## 2020-07-08 NOTE — PROGRESS NOTES
Referral given for patient to see gastroenterology for her colitis, left upper quadrant abdominal pain and epigastric pain, as requested  We will mail the referral to her or ask her to come to the office and pick it up

## 2020-08-05 DIAGNOSIS — K21.9 GASTROESOPHAGEAL REFLUX DISEASE WITHOUT ESOPHAGITIS: ICD-10-CM

## 2020-08-05 DIAGNOSIS — R10.13 EPIGASTRIC PAIN: ICD-10-CM

## 2020-08-05 RX ORDER — FAMOTIDINE 20 MG/1
TABLET, FILM COATED ORAL
Qty: 60 TABLET | Refills: 1 | OUTPATIENT
Start: 2020-08-05

## 2020-08-06 ENCOUNTER — OFFICE VISIT (OUTPATIENT)
Dept: GASTROENTEROLOGY | Facility: CLINIC | Age: 25
End: 2020-08-06
Payer: COMMERCIAL

## 2020-08-06 ENCOUNTER — PREP FOR PROCEDURE (OUTPATIENT)
Dept: GASTROENTEROLOGY | Facility: CLINIC | Age: 25
End: 2020-08-06

## 2020-08-06 VITALS — TEMPERATURE: 99.7 F | HEART RATE: 99 BPM | HEIGHT: 65 IN | WEIGHT: 264 LBS | BODY MASS INDEX: 43.99 KG/M2

## 2020-08-06 DIAGNOSIS — K52.9 COLITIS: Primary | ICD-10-CM

## 2020-08-06 DIAGNOSIS — R10.12 LEFT UPPER QUADRANT ABDOMINAL PAIN: ICD-10-CM

## 2020-08-06 DIAGNOSIS — R10.13 EPIGASTRIC PAIN: ICD-10-CM

## 2020-08-06 DIAGNOSIS — E66.01 MORBID OBESITY (HCC): ICD-10-CM

## 2020-08-06 PROCEDURE — 1036F TOBACCO NON-USER: CPT | Performed by: INTERNAL MEDICINE

## 2020-08-06 PROCEDURE — 3008F BODY MASS INDEX DOCD: CPT | Performed by: INTERNAL MEDICINE

## 2020-08-06 PROCEDURE — 99244 OFF/OP CNSLTJ NEW/EST MOD 40: CPT | Performed by: INTERNAL MEDICINE

## 2020-08-06 RX ORDER — FAMOTIDINE 20 MG/1
20 TABLET, FILM COATED ORAL 2 TIMES DAILY
Qty: 60 TABLET | Refills: 5 | Status: SHIPPED | OUTPATIENT
Start: 2020-08-06 | End: 2021-02-10 | Stop reason: CLARIF

## 2020-08-06 RX ORDER — DICYCLOMINE HCL 20 MG
20 TABLET ORAL EVERY 6 HOURS PRN
Qty: 120 TABLET | Refills: 5 | Status: SHIPPED | OUTPATIENT
Start: 2020-08-06

## 2020-08-06 RX ORDER — SODIUM, POTASSIUM,MAG SULFATES 17.5-3.13G
1 SOLUTION, RECONSTITUTED, ORAL ORAL ONCE
Qty: 1 BOTTLE | Refills: 0 | Status: SHIPPED | OUTPATIENT
Start: 2020-08-06 | End: 2020-10-21 | Stop reason: ALTCHOICE

## 2020-08-06 NOTE — PATIENT INSTRUCTIONS
Colonoscopy scheduled on 9/2/20 with Dr Buitrago at [facility]  Rose gave patient verbal instructions/paper work given    Prep-Suprep

## 2020-08-06 NOTE — PROGRESS NOTES
[facility] - Outpatient Consultation  Ade Baker 30 y o  female MRN: [id number]  Encounter: [id number]          ASSESSMENT AND PLAN:    30year old female referred by PCP    1  Colitis and left sided abdominal pain- not anemic  - chronic in nature now lasting two months; given symptoms and CT scan findings of colitis I would recommend colonoscopy, the patient is agreeable to this, other differential includes irritable bowel syndrome given her alternating constipation diarrhea, she was tested for H pylori which was negative  -for her crampy pain I have prescribed Bentyl to use as needed, she can also continue the Pepcid and I have refilled this as well    Will see her back in the office in a few months time            ______________________________________________________________________    HPI:  30year old female referred by PCP  The pt reports having left sided pain for the past two months  Saw her PCP and had a CT scan which showed colitis  She was treated with antibiotics but could not tolerate them  She reports some bloating, cramping, as well as sharp pains  Also with a lot of gas and burping  She was started on pepcid by her PCP  Denies rectal bleeding or family history of IBD or colon cancer  Had similar symptoms with pain about a year ago,  but it was on the right side and had cholecystectomy one year ago with path revealing acute and chronic cholecystitis and cholelithiasis  Also reports some alternating constipation and diarrhea  REVIEW OF SYSTEMS:    CONSTITUTIONAL: Denies any fever, chills, rigors, and weight loss  HEENT: No earache or tinnitus  Denies hearing loss or visual disturbances  CARDIOVASCULAR: No chest pain or palpitations  RESPIRATORY: Denies any cough, hemoptysis, shortness of breath or dyspnea on exertion  GASTROINTESTINAL: As noted in the History of Present Illness  GENITOURINARY: No problems with urination   Denies any hematuria or dysuria  NEUROLOGIC: No dizziness or vertigo, denies headaches  MUSCULOSKELETAL: Denies any muscle or joint pain  SKIN: Denies skin rashes or itching  ENDOCRINE: Denies excessive thirst  Denies intolerance to heat or cold  PSYCHOSOCIAL: Denies depression or anxiety  Denies any recent memory loss  Historical Information   Past Medical History:   Diagnosis Date    Allergic     Seasonal    Anxiety      Past Surgical History:   Procedure Laterality Date    CHOLECYSTECTOMY LAPAROSCOPIC N/A 9/19/2019    Procedure: CHOLECYSTECTOMY LAPAROSCOPIC;  Surgeon: Justus Desai MD;  Location: [facility] OR;  Service: General    WISDOM TOOTH EXTRACTION       Social History   Social History     Substance and Sexual Activity   Alcohol Use Not Currently    Comment: Very rare     Social History     Substance and Sexual Activity   Drug Use Never     Social History     Tobacco Use   Smoking Status Never Smoker   Smokeless Tobacco Never Used     Family History   Problem Relation Age of Onset    Hypertension Father     Anxiety disorder Maternal Grandmother     Diabetes Paternal Aunt     Diabetes Paternal Uncle     Psoriasis Paternal Uncle        Meds/Allergies       Current Outpatient Medications:     ALPRAZolam (XANAX) 0 25 mg tablet    cetirizine (ZyrTEC) 10 mg tablet    famotidine (PEPCID) 20 mg tablet    Multiple Vitamin (MULTIVITAMIN) tablet    predniSONE 5 mg tablet    Allergies   Allergen Reactions    Pollen Extract            Objective     Pulse 99, temperature 99 7 °F (37 6 °C), temperature source Tympanic, height 5' 5" (1 651 m), weight 120 kg (264 lb), not currently breastfeeding  Body mass index is 43 93 kg/m²        PHYSICAL EXAM:      General Appearance:   Alert, cooperative, no distress   HEENT:   Normocephalic, atraumatic, anicteric      Neck:  Supple, symmetrical, trachea midline   Lungs:   Clear to auscultation bilaterally; no rales, rhonchi or wheezing; respirations unlabored    Heart[de-identified]   Regular rate and rhythm; no murmur, rub, or gallop  Abdomen:   Soft, non-tender, non-distended; normal bowel sounds; no masses, no organomegaly    Genitalia:   Deferred    Rectal:   Deferred    Extremities:  No cyanosis, clubbing or edema    Pulses:  2+ and symmetric    Skin:  No jaundice, rashes, or lesions    Lymph nodes:  No palpable cervical lymphadenopathy        Lab Results:   No visits with results within 1 Day(s) from this visit     Latest known visit with results is:   Appointment on 06/16/2020   Component Date Value    H pylori Ag, Stl 06/16/2020 Negative      CMP: normal except for mildly elevated ALT- 94;one time     Radiology Results:   CT:  Diffuse colonic thickening

## 2020-08-20 ENCOUNTER — TELEPHONE (OUTPATIENT)
Dept: GASTROENTEROLOGY | Facility: CLINIC | Age: 25
End: 2020-08-20

## 2020-08-20 NOTE — TELEPHONE ENCOUNTER
Called [employer] [place] and spoke to [address] for prior authorization requirements for patient's colonoscopy  No auth required    Call ref # [id number]

## 2020-08-24 ENCOUNTER — TELEPHONE (OUTPATIENT)
Dept: GASTROENTEROLOGY | Facility: AMBULARY SURGERY CENTER | Age: 25
End: 2020-08-24

## 2020-08-24 NOTE — TELEPHONE ENCOUNTER
Pt rescheduled procedure on 10/21/20 with Dr Buitrago at [facility]  Suprep instructions mailed

## 2020-08-24 NOTE — TELEPHONE ENCOUNTER
Patients GI provider:  Dr Robles Bacon    Number to return call: (  [phone]    Reason for call: Pt calling to reschedule her colon on 9-2-20    Scheduled procedure/appointment date if applicable: Apt/procedure 9-2-20

## 2020-10-07 ENCOUNTER — ANESTHESIA EVENT (OUTPATIENT)
Dept: GASTROENTEROLOGY | Facility: AMBULARY SURGERY CENTER | Age: 25
End: 2020-10-07

## 2020-10-21 ENCOUNTER — HOSPITAL ENCOUNTER (OUTPATIENT)
Dept: GASTROENTEROLOGY | Facility: AMBULARY SURGERY CENTER | Age: 25
Setting detail: OUTPATIENT SURGERY
Discharge: HOME | End: 2020-10-21
Attending: INTERNAL MEDICINE
Payer: COMMERCIAL

## 2020-10-21 ENCOUNTER — ANESTHESIA (OUTPATIENT)
Dept: GASTROENTEROLOGY | Facility: AMBULARY SURGERY CENTER | Age: 25
End: 2020-10-21

## 2020-10-21 VITALS
SYSTOLIC BLOOD PRESSURE: 117 MMHG | DIASTOLIC BLOOD PRESSURE: 66 MMHG | HEIGHT: 65 IN | OXYGEN SATURATION: 100 % | TEMPERATURE: 97.1 F | BODY MASS INDEX: 42.15 KG/M2 | RESPIRATION RATE: 20 BRPM | WEIGHT: 253 LBS | HEART RATE: 86 BPM

## 2020-10-21 VITALS — HEART RATE: 69 BPM

## 2020-10-21 DIAGNOSIS — K52.9 COLITIS: ICD-10-CM

## 2020-10-21 PROBLEM — J06.9 VIRAL UPPER RESPIRATORY TRACT INFECTION: Status: RESOLVED | Noted: 2019-07-29 | Resolved: 2020-10-21

## 2020-10-21 PROBLEM — K21.9 GASTROESOPHAGEAL REFLUX DISEASE: Status: ACTIVE | Noted: 2020-10-21

## 2020-10-21 LAB
EXT PREGNANCY TEST URINE: NEGATIVE
EXT. CONTROL: NORMAL
GLUCOSE SERPL-MCNC: 140 MG/DL (ref 65–140)

## 2020-10-21 PROCEDURE — 45380 COLONOSCOPY AND BIOPSY: CPT | Performed by: INTERNAL MEDICINE

## 2020-10-21 PROCEDURE — 88305 TISSUE EXAM BY PATHOLOGIST: CPT | Performed by: PATHOLOGY

## 2020-10-21 PROCEDURE — 81025 URINE PREGNANCY TEST: CPT | Performed by: INTERNAL MEDICINE

## 2020-10-21 PROCEDURE — 82948 REAGENT STRIP/BLOOD GLUCOSE: CPT

## 2020-10-21 RX ORDER — LIDOCAINE HYDROCHLORIDE 10 MG/ML
0.5 INJECTION, SOLUTION EPIDURAL; INFILTRATION; INTRACAUDAL; PERINEURAL ONCE AS NEEDED
Status: DISCONTINUED | OUTPATIENT
Start: 2020-10-21 | End: 2020-10-25 | Stop reason: HOSPADM

## 2020-10-21 RX ORDER — PROPOFOL 10 MG/ML
INJECTION, EMULSION INTRAVENOUS AS NEEDED
Status: DISCONTINUED | OUTPATIENT
Start: 2020-10-21 | End: 2020-10-21

## 2020-10-21 RX ORDER — LIDOCAINE HYDROCHLORIDE 10 MG/ML
INJECTION, SOLUTION EPIDURAL; INFILTRATION; INTRACAUDAL; PERINEURAL AS NEEDED
Status: DISCONTINUED | OUTPATIENT
Start: 2020-10-21 | End: 2020-10-21

## 2020-10-21 RX ORDER — SODIUM CHLORIDE, SODIUM LACTATE, POTASSIUM CHLORIDE, CALCIUM CHLORIDE 600; 310; 30; 20 MG/100ML; MG/100ML; MG/100ML; MG/100ML
125 INJECTION, SOLUTION INTRAVENOUS CONTINUOUS
Status: DISCONTINUED | OUTPATIENT
Start: 2020-10-21 | End: 2020-10-25 | Stop reason: HOSPADM

## 2020-10-21 RX ADMIN — SODIUM CHLORIDE, SODIUM LACTATE, POTASSIUM CHLORIDE, AND CALCIUM CHLORIDE: .6; .31; .03; .02 INJECTION, SOLUTION INTRAVENOUS at 11:59

## 2020-10-21 RX ADMIN — PROPOFOL 50 MG: 10 INJECTION, EMULSION INTRAVENOUS at 11:49

## 2020-10-21 RX ADMIN — PROPOFOL 50 MG: 10 INJECTION, EMULSION INTRAVENOUS at 11:45

## 2020-10-21 RX ADMIN — PROPOFOL 50 MG: 10 INJECTION, EMULSION INTRAVENOUS at 11:46

## 2020-10-21 RX ADMIN — LIDOCAINE HYDROCHLORIDE 70 MG: 10 INJECTION, SOLUTION EPIDURAL; INFILTRATION; INTRACAUDAL at 11:43

## 2020-10-21 RX ADMIN — SODIUM CHLORIDE, SODIUM LACTATE, POTASSIUM CHLORIDE, AND CALCIUM CHLORIDE: .6; .31; .03; .02 INJECTION, SOLUTION INTRAVENOUS at 11:35

## 2020-10-21 RX ADMIN — PROPOFOL 50 MG: 10 INJECTION, EMULSION INTRAVENOUS at 11:52

## 2020-10-21 RX ADMIN — PROPOFOL 50 MG: 10 INJECTION, EMULSION INTRAVENOUS at 11:55

## 2020-10-21 RX ADMIN — PROPOFOL 80 MG: 10 INJECTION, EMULSION INTRAVENOUS at 11:44

## 2021-02-10 ENCOUNTER — OFFICE VISIT (OUTPATIENT)
Dept: GASTROENTEROLOGY | Facility: CLINIC | Age: 26
End: 2021-02-10
Payer: COMMERCIAL

## 2021-02-10 VITALS
BODY MASS INDEX: 44.32 KG/M2 | SYSTOLIC BLOOD PRESSURE: 120 MMHG | DIASTOLIC BLOOD PRESSURE: 80 MMHG | TEMPERATURE: 99.6 F | WEIGHT: 266 LBS | HEART RATE: 88 BPM | HEIGHT: 65 IN

## 2021-02-10 DIAGNOSIS — E66.01 MORBID OBESITY (HCC): ICD-10-CM

## 2021-02-10 DIAGNOSIS — K58.9 IRRITABLE BOWEL SYNDROME, UNSPECIFIED TYPE: Primary | ICD-10-CM

## 2021-02-10 DIAGNOSIS — K21.9 GASTROESOPHAGEAL REFLUX DISEASE, UNSPECIFIED WHETHER ESOPHAGITIS PRESENT: ICD-10-CM

## 2021-02-10 PROBLEM — K52.9 COLITIS: Status: RESOLVED | Noted: 2020-06-26 | Resolved: 2021-02-10

## 2021-02-10 PROCEDURE — 1036F TOBACCO NON-USER: CPT | Performed by: INTERNAL MEDICINE

## 2021-02-10 PROCEDURE — 99213 OFFICE O/P EST LOW 20 MIN: CPT | Performed by: INTERNAL MEDICINE

## 2021-02-10 PROCEDURE — 3008F BODY MASS INDEX DOCD: CPT | Performed by: INTERNAL MEDICINE

## 2021-02-10 NOTE — PROGRESS NOTES
[facility] - Outpatient Follow-up Note  Praveen Sands 30 y o  female MRN: [id number]  Encounter: [id number]          ASSESSMENT AND PLAN:  30year old female here for follow up  1  Irritable bowel syndrome, unspecified type  - bentyl as needed   - trial of low fodmap diet    2  Gastroesophageal reflux disease, unspecified whether esophagitis present  - no longer taking pepcid daily    3  BMI 40 0-44 9, adult ([facility] [address] )    Follow up in a few months   _________________________________________________________  SUBJECTIVE:  30year old female   Here for follow up of colonoscopy  We did biopsies to assess for microscopic colitis which was negative  Had cholecystectomy in 2019  REVIEW OF SYSTEMS IS OTHERWISE NEGATIVE        Historical Information   Past Medical History:   Diagnosis Date    Allergic     Seasonal    Anxiety      Past Surgical History:   Procedure Laterality Date    CHOLECYSTECTOMY LAPAROSCOPIC N/A 9/19/2019    Procedure: CHOLECYSTECTOMY LAPAROSCOPIC;  Surgeon: Latrell Plasencia MD;  Location: [facility] OR;  Service: General    WISDOM TOOTH EXTRACTION       Social History   Social History     Substance and Sexual Activity   Alcohol Use Not Currently    Comment: Very rare     Social History     Substance and Sexual Activity   Drug Use Never     Social History     Tobacco Use   Smoking Status Never Smoker   Smokeless Tobacco Never Used     Family History   Problem Relation Age of Onset    Hypertension Father     Anxiety disorder Maternal Grandmother     Diabetes Paternal Aunt     Diabetes Paternal Uncle     Psoriasis Paternal Uncle        Meds/Allergies       Current Outpatient Medications:     ALPRAZolam (XANAX) 0 25 mg tablet    cetirizine (ZyrTEC) 10 mg tablet    dicyclomine (BENTYL) 20 mg tablet    Multiple Vitamin (MULTIVITAMIN) tablet    Allergies   Allergen Reactions    Pollen Extract            Objective     Blood pressure 120/80, pulse 88, temperature 99 6 °F (37 6 °C), temperature source Tympanic, height 5' 5" (1 651 m), weight 121 kg (266 lb), not currently breastfeeding  Body mass index is 44 26 kg/m²  PHYSICAL EXAM:      General Appearance:   Alert, cooperative, no distress   HEENT:   Normocephalic, atraumatic, anicteric      Neck:  Supple, symmetrical, trachea midline   Lungs:   Clear to auscultation bilaterally; no rales, rhonchi or wheezing; respirations unlabored    Heart[de-identified]   Regular rate and rhythm; no murmur, rub, or gallop  Abdomen:   Soft, non-tender, non-distended; normal bowel sounds; no masses, no organomegaly    Genitalia:   Deferred    Rectal:   Deferred    Extremities:  No cyanosis, clubbing or edema    Pulses:  2+ and symmetric    Skin:  No jaundice, rashes, or lesions    Lymph nodes:  No palpable cervical lymphadenopathy        Lab Results:   No visits with results within 1 Day(s) from this visit  Latest known visit with results is:   Hospital Outpatient Visit on 10/21/2020   Component Date Value    EXT Preg Test, Ur 10/21/2020 Negative     Control 10/21/2020 Valid     Case Report 10/21/2020                      Value:Surgical Pathology Report                         Case: [id number]                                   Authorizing Provider:  Chacha Gallagher DO        Collected:           10/21/2020 1155              Ordering Location:     [facility]        Received:            10/21/2020 [address] [facility] Endoscopy                                                           Pathologist:           Jeana Kumar MD                                                                Specimen:    Colon, cold forcep bx  - Random colon                                                       Final Diagnosis 10/21/2020                      Value: This result contains rich text formatting which cannot be displayed here   Additional Information 10/21/2020                      Value: This result contains rich text formatting which cannot be displayed here  Dumont Gross Description 10/21/2020                      Value: This result contains rich text formatting which cannot be displayed here   POC Glucose 10/21/2020 140          Radiology Results:   No results found

## 2021-02-23 ENCOUNTER — TELEPHONE (OUTPATIENT)
Dept: GASTROENTEROLOGY | Facility: AMBULARY SURGERY CENTER | Age: 26
End: 2021-02-23

## 2021-02-23 ENCOUNTER — TRANSCRIBE ORDERS (OUTPATIENT)
Dept: GASTROENTEROLOGY | Facility: CLINIC | Age: 26
End: 2021-02-23

## 2021-02-23 NOTE — TELEPHONE ENCOUNTER
Patients GI provider:  Dr Dick Narrow     Number to return call: [phone]    Reason for call: Pt calling stated she has not gotten a call for her lab results       Scheduled procedure/appointment date if applicable: Apt/procedure

## 2021-03-25 DIAGNOSIS — Z23 ENCOUNTER FOR IMMUNIZATION: ICD-10-CM

## 2021-05-16 ENCOUNTER — APPOINTMENT (EMERGENCY)
Dept: RADIOLOGY | Facility: HOSPITAL | Age: 26
End: 2021-05-16
Payer: COMMERCIAL

## 2021-05-16 ENCOUNTER — AMB VIDEO VISIT (OUTPATIENT)
Dept: OTHER | Facility: HOSPITAL | Age: 26
End: 2021-05-16

## 2021-05-16 ENCOUNTER — HOSPITAL ENCOUNTER (EMERGENCY)
Facility: HOSPITAL | Age: 26
Discharge: HOME | End: 2021-05-17
Attending: EMERGENCY MEDICINE | Admitting: EMERGENCY MEDICINE
Payer: COMMERCIAL

## 2021-05-16 DIAGNOSIS — R10.9 ABDOMINAL PAIN: ICD-10-CM

## 2021-05-16 DIAGNOSIS — N12 PYELONEPHRITIS: Primary | ICD-10-CM

## 2021-05-16 LAB
ALBUMIN SERPL BCP-MCNC: 3.8 G/DL (ref 3.5–5)
ALP SERPL-CCNC: 67 U/L (ref 46–116)
ALT SERPL W P-5'-P-CCNC: 27 U/L (ref 12–78)
ANION GAP SERPL CALCULATED.3IONS-SCNC: 5 MMOL/L (ref 4–13)
AST SERPL W P-5'-P-CCNC: 14 U/L (ref 5–45)
BACTERIA UR QL AUTO: ABNORMAL /HPF
BASOPHILS # BLD AUTO: 0.02 THOUSANDS/ΜL (ref 0–0.1)
BASOPHILS NFR BLD AUTO: 0 % (ref 0–1)
BILIRUB SERPL-MCNC: 0.24 MG/DL (ref 0.2–1)
BILIRUB UR QL STRIP: NEGATIVE
BUN SERPL-MCNC: 12 MG/DL (ref 5–25)
CALCIUM SERPL-MCNC: 9.3 MG/DL (ref 8.3–10.1)
CHLORIDE SERPL-SCNC: 110 MMOL/L (ref 100–108)
CLARITY UR: CLEAR
CO2 SERPL-SCNC: 25 MMOL/L (ref 21–32)
COLOR UR: YELLOW
COLOR, POC: ABNORMAL
CREAT SERPL-MCNC: 0.76 MG/DL (ref 0.6–1.3)
EOSINOPHIL # BLD AUTO: 0.02 THOUSAND/ΜL (ref 0–0.61)
EOSINOPHIL NFR BLD AUTO: 0 % (ref 0–6)
ERYTHROCYTE [DISTWIDTH] IN BLOOD BY AUTOMATED COUNT: 14.3 % (ref 11.6–15.1)
GFR SERPL CREATININE-BSD FRML MDRD: 107 ML/MIN/1.73SQ M
GLUCOSE SERPL-MCNC: 103 MG/DL (ref 65–140)
GLUCOSE UR STRIP-MCNC: NEGATIVE MG/DL
HCG SERPL QL: NEGATIVE
HCT VFR BLD AUTO: 41.6 % (ref 34.8–46.1)
HGB BLD-MCNC: 13.4 G/DL (ref 11.5–15.4)
HGB UR QL STRIP.AUTO: ABNORMAL
HYALINE CASTS #/AREA URNS LPF: ABNORMAL /LPF
IMM GRANULOCYTES # BLD AUTO: 0.04 THOUSAND/UL (ref 0–0.2)
IMM GRANULOCYTES NFR BLD AUTO: 0 % (ref 0–2)
KETONES UR STRIP-MCNC: ABNORMAL MG/DL
LEUKOCYTE ESTERASE UR QL STRIP: ABNORMAL
LIPASE SERPL-CCNC: 62 U/L (ref 73–393)
LYMPHOCYTES # BLD AUTO: 1.06 THOUSANDS/ΜL (ref 0.6–4.47)
LYMPHOCYTES NFR BLD AUTO: 12 % (ref 14–44)
MCH RBC QN AUTO: 26.4 PG (ref 26.8–34.3)
MCHC RBC AUTO-ENTMCNC: 32.2 G/DL (ref 31.4–37.4)
MCV RBC AUTO: 82 FL (ref 82–98)
MONOCYTES # BLD AUTO: 0.6 THOUSAND/ΜL (ref 0.17–1.22)
MONOCYTES NFR BLD AUTO: 7 % (ref 4–12)
NEUTROPHILS # BLD AUTO: 7.36 THOUSANDS/ΜL (ref 1.85–7.62)
NEUTS SEG NFR BLD AUTO: 81 % (ref 43–75)
NITRITE UR QL STRIP: NEGATIVE
NON-SQ EPI CELLS URNS QL MICRO: ABNORMAL /HPF
NRBC BLD AUTO-RTO: 0 /100 WBCS
PH UR STRIP.AUTO: 6 [PH] (ref 4.5–8)
PLATELET # BLD AUTO: 248 THOUSANDS/UL (ref 149–390)
PMV BLD AUTO: 12.8 FL (ref 8.9–12.7)
POTASSIUM SERPL-SCNC: 4.1 MMOL/L (ref 3.5–5.3)
PROT SERPL-MCNC: 7.7 G/DL (ref 6.4–8.2)
PROT UR STRIP-MCNC: NEGATIVE MG/DL
RBC # BLD AUTO: 5.07 MILLION/UL (ref 3.81–5.12)
RBC #/AREA URNS AUTO: ABNORMAL /HPF
SODIUM SERPL-SCNC: 140 MMOL/L (ref 136–145)
SP GR UR STRIP.AUTO: 1.02 (ref 1–1.03)
UROBILINOGEN UR QL STRIP.AUTO: 0.2 E.U./DL
WBC # BLD AUTO: 9.1 THOUSAND/UL (ref 4.31–10.16)
WBC #/AREA URNS AUTO: ABNORMAL /HPF

## 2021-05-16 PROCEDURE — G1004 CDSM NDSC: HCPCS

## 2021-05-16 PROCEDURE — 85025 COMPLETE CBC W/AUTO DIFF WBC: CPT | Performed by: EMERGENCY MEDICINE

## 2021-05-16 PROCEDURE — 74177 CT ABD & PELVIS W/CONTRAST: CPT

## 2021-05-16 PROCEDURE — 80053 COMPREHEN METABOLIC PANEL: CPT | Performed by: EMERGENCY MEDICINE

## 2021-05-16 PROCEDURE — 83690 ASSAY OF LIPASE: CPT | Performed by: EMERGENCY MEDICINE

## 2021-05-16 PROCEDURE — 36415 COLL VENOUS BLD VENIPUNCTURE: CPT | Performed by: EMERGENCY MEDICINE

## 2021-05-16 PROCEDURE — ECARE PR SL URGENT CARE VIRTUAL VISIT: Performed by: FAMILY MEDICINE

## 2021-05-16 PROCEDURE — 84703 CHORIONIC GONADOTROPIN ASSAY: CPT | Performed by: EMERGENCY MEDICINE

## 2021-05-16 PROCEDURE — 99284 EMERGENCY DEPT VISIT MOD MDM: CPT

## 2021-05-16 PROCEDURE — 81001 URINALYSIS AUTO W/SCOPE: CPT

## 2021-05-16 PROCEDURE — 96374 THER/PROPH/DIAG INJ IV PUSH: CPT

## 2021-05-16 PROCEDURE — 99284 EMERGENCY DEPT VISIT MOD MDM: CPT | Performed by: EMERGENCY MEDICINE

## 2021-05-16 RX ORDER — SUCRALFATE 1 G/1
1 TABLET ORAL ONCE
Status: COMPLETED | OUTPATIENT
Start: 2021-05-16 | End: 2021-05-16

## 2021-05-16 RX ORDER — MAGNESIUM HYDROXIDE/ALUMINUM HYDROXICE/SIMETHICONE 120; 1200; 1200 MG/30ML; MG/30ML; MG/30ML
30 SUSPENSION ORAL ONCE
Status: COMPLETED | OUTPATIENT
Start: 2021-05-16 | End: 2021-05-16

## 2021-05-16 RX ADMIN — IOHEXOL 100 ML: 350 INJECTION, SOLUTION INTRAVENOUS at 23:44

## 2021-05-16 RX ADMIN — SUCRALFATE 1 G: 1 TABLET ORAL at 21:54

## 2021-05-16 RX ADMIN — ALUMINUM HYDROXIDE, MAGNESIUM HYDROXIDE, AND SIMETHICONE 30 ML: 200; 200; 20 SUSPENSION ORAL at 21:54

## 2021-05-16 RX ADMIN — FAMOTIDINE 20 MG: 10 INJECTION INTRAVENOUS at 21:54

## 2021-05-17 VITALS
RESPIRATION RATE: 16 BRPM | TEMPERATURE: 98.1 F | SYSTOLIC BLOOD PRESSURE: 134 MMHG | OXYGEN SATURATION: 96 % | DIASTOLIC BLOOD PRESSURE: 68 MMHG | WEIGHT: 265 LBS | HEIGHT: 65 IN | HEART RATE: 70 BPM | BODY MASS INDEX: 44.15 KG/M2

## 2021-05-17 PROCEDURE — 96375 TX/PRO/DX INJ NEW DRUG ADDON: CPT

## 2021-05-17 RX ORDER — CEPHALEXIN 250 MG/1
500 CAPSULE ORAL ONCE
Status: COMPLETED | OUTPATIENT
Start: 2021-05-17 | End: 2021-05-17

## 2021-05-17 RX ORDER — KETOROLAC TROMETHAMINE 30 MG/ML
15 INJECTION, SOLUTION INTRAMUSCULAR; INTRAVENOUS ONCE
Status: COMPLETED | OUTPATIENT
Start: 2021-05-17 | End: 2021-05-17

## 2021-05-17 RX ORDER — ONDANSETRON 2 MG/ML
4 INJECTION INTRAMUSCULAR; INTRAVENOUS ONCE
Status: COMPLETED | OUTPATIENT
Start: 2021-05-17 | End: 2021-05-17

## 2021-05-17 RX ORDER — CEPHALEXIN 500 MG/1
500 CAPSULE ORAL EVERY 6 HOURS SCHEDULED
Qty: 40 CAPSULE | Refills: 0 | Status: SHIPPED | OUTPATIENT
Start: 2021-05-17 | End: 2021-05-27

## 2021-05-17 RX ADMIN — KETOROLAC TROMETHAMINE 15 MG: 30 INJECTION, SOLUTION INTRAMUSCULAR; INTRAVENOUS at 00:43

## 2021-05-17 RX ADMIN — CEPHALEXIN 500 MG: 250 CAPSULE ORAL at 02:34

## 2021-05-17 RX ADMIN — ONDANSETRON 4 MG: 2 INJECTION INTRAMUSCULAR; INTRAVENOUS at 00:43

## 2021-05-17 NOTE — ED ATTENDING ATTESTATION
5/16/2021  ICade MD, saw and evaluated the patient  I have discussed the patient with the resident/non-physician practitioner and agree with the resident's/non-physician practitioner's findings, Plan of Care, and MDM as documented in the resident's/non-physician practitioner's note, except where noted  All available labs and Radiology studies were reviewed  I was present for key portions of any procedure(s) performed by the resident/non-physician practitioner and I was immediately available to provide assistance  At this point I agree with the current assessment done in the Emergency Department  I have conducted an independent evaluation of this patient a history and physical is as follows:    ED Course     Patient presents for evaluation of 1 day or RUQ abdominal pain  Reports hx of similar  Has hx of cholecystectomy in 2019  Patient states the pain radiates into her back  No additional complaints  A/P:  Abdominal pain, flank pain  Will check labs, urine to evaluate for infection or pregnancy, CT abd/pelvis  Will treat pain and reassess      Critical Care Time  Procedures

## 2021-05-17 NOTE — DISCHARGE INSTRUCTIONS
Please take keflex 4 times per day for the next 10 days  Return to the ER if you have worsening or severe pain or develop high fevers or intractable nausea/vomiting

## 2021-05-17 NOTE — ED PROVIDER NOTES
History  Chief Complaint   Patient presents with    Abdominal Pain     pt c/o RUQ abdominal pain that radiates to her R flank, pt describes the pain as pressure, worse with bending down, also started with n/v/d last night, denies fevers      Patient is a 23-year-old female who presents for evaluation of right upper quadrant abdominal pain  Patient says that she had a cholecystectomy in 2019 for cholecystitis  Patient says that over the past 3 months she has had intermittent left upper quadrant/right upper quadrant abdominal pain  She describes it as a pressure  This pain started last night and has been worsening and is moderate severity at this time  Radiates into her back  She does endorse nausea 1 episode of emesis that was nonbloody nonbilious prior to arrival   She also endorses 3 episodes of nonbloody diarrhea  She denies urinary complaints including hematuria or dysuria  Last menstrual period was 4 weeks ago  Patient says the pain is worsened after eating  Prior to Admission Medications   Prescriptions Last Dose Informant Patient Reported? Taking?    ALPRAZolam (XANAX) 0 25 mg tablet 5/16/2021 at Unknown time Self No Yes   Sig: Take 1 tablet (0 25 mg total) by mouth daily as needed for anxiety   Multiple Vitamin (MULTIVITAMIN) tablet More than a month at Unknown time Self Yes No   Sig: Take 1 tablet by mouth daily   cetirizine (ZyrTEC) 10 mg tablet Past Week at Unknown time Self Yes Yes   Sig: Take 10 mg by mouth as needed    dicyclomine (BENTYL) 20 mg tablet Not Taking at Unknown time Self No No   Sig: Take 1 tablet (20 mg total) by mouth every 6 (six) hours as needed (abd pain)   Patient not taking: Reported on 5/16/2021      Facility-Administered Medications: None       Past Medical History:   Diagnosis Date    Allergic     Seasonal    Anxiety        Past Surgical History:   Procedure Laterality Date    CHOLECYSTECTOMY LAPAROSCOPIC N/A 9/19/2019    Procedure: CHOLECYSTECTOMY LAPAROSCOPIC;  Surgeon: Cecilia Ryan MD;  Location: [facility];  Service: General    WISDOM TOOTH EXTRACTION         Family History   Problem Relation Age of Onset    Hypertension Father     Anxiety disorder Maternal Grandmother     Diabetes Paternal Aunt     Diabetes Paternal Uncle     Psoriasis Paternal Uncle      I have reviewed and agree with the history as documented  E-Cigarette/Vaping    E-Cigarette Use Never User      E-Cigarette/Vaping Substances     Social History     Tobacco Use    Smoking status: Never Smoker    Smokeless tobacco: Never Used   Substance Use Topics    Alcohol use: Not Currently     Comment: Very rare    Drug use: Never        Review of Systems   Constitutional: Negative for fever  HENT: Negative for sore throat  Respiratory: Negative for shortness of breath  Cardiovascular: Negative for chest pain  Gastrointestinal: Positive for abdominal pain, diarrhea, nausea and vomiting  Genitourinary: Negative for dysuria  Musculoskeletal: Negative for back pain  Skin: Negative for rash  Neurological: Negative for light-headedness  Psychiatric/Behavioral: Negative for agitation  All other systems reviewed and are negative  Physical Exam  ED Triage Vitals   Temperature Pulse Respirations Blood Pressure SpO2   05/16/21 2123 05/16/21 2123 05/16/21 2123 05/16/21 2123 05/16/21 2123   98 1 °F (36 7 °C) 94 18 145/64 98 %      Temp Source Heart Rate Source Patient Position - Orthostatic VS BP Location FiO2 (%)   05/16/21 2123 05/16/21 2123 05/16/21 2330 05/16/21 2330 --   Oral Monitor Sitting Right arm       Pain Score       05/16/21 2123       9             Orthostatic Vital Signs  Vitals:    05/16/21 2123 05/16/21 2230 05/16/21 2330   BP: 145/64 140/85 134/68   Pulse: 94 96 70   Patient Position - Orthostatic VS:   Sitting       Physical Exam  Vitals signs reviewed  Constitutional:       General: She is not in acute distress       Appearance: She is well-developed  HENT:      Head: Normocephalic  Eyes:      Pupils: Pupils are equal, round, and reactive to light  Neck:      Musculoskeletal: Normal range of motion and neck supple  Cardiovascular:      Rate and Rhythm: Normal rate and regular rhythm  Heart sounds: Normal heart sounds  Pulmonary:      Effort: Pulmonary effort is normal       Breath sounds: Normal breath sounds  Abdominal:      General: Bowel sounds are normal  There is no distension  Palpations: Abdomen is soft  Tenderness: There is abdominal tenderness  There is no guarding  Comments: Patient with mild left upper quadrant/epigastric/right upper quadrant abdominal tenderness  Culp sign negative  No rebound tenderness or guarding   Musculoskeletal: Normal range of motion  General: No tenderness or deformity  Skin:     General: Skin is warm and dry  Capillary Refill: Capillary refill takes less than 2 seconds  Neurological:      Mental Status: She is alert and oriented to person, place, and time  Cranial Nerves: No cranial nerve deficit  Sensory: No sensory deficit  Psychiatric:         Behavior: Behavior normal          Thought Content:  Thought content normal          Judgment: Judgment normal          ED Medications  Medications   aluminum-magnesium hydroxide-simethicone (MYLANTA) oral suspension 30 mL (30 mL Oral Given 5/16/21 2154)   famotidine (PEPCID) injection 20 mg (20 mg Intravenous Given 5/16/21 2154)   sucralfate (CARAFATE) tablet 1 g (1 g Oral Given 5/16/21 2154)   iohexol (OMNIPAQUE) 350 MG/ML injection (MULTI-DOSE) 100 mL (100 mL Intravenous Given 5/16/21 2344)   ketorolac (TORADOL) injection 15 mg (15 mg Intravenous Given 5/17/21 0043)   ondansetron (ZOFRAN) injection 4 mg (4 mg Intravenous Given 5/17/21 0043)   cephalexin (KEFLEX) capsule 500 mg (500 mg Oral Given 5/17/21 0234)       Diagnostic Studies  Results Reviewed     Procedure Component Value Units Date/Time Urine Microscopic [[id number]]  (Abnormal) Collected: 05/16/21 2239    Lab Status: Final result Specimen: Urine, Other Updated: 05/16/21 2250     RBC, UA 2-4 /hpf      WBC, UA 4-10 /hpf      Epithelial Cells None Seen /hpf      Bacteria, UA Occasional /hpf      Hyaline Casts, UA None Seen /lpf     POCT urinalysis dipstick [[id number]]  (Abnormal) Resulted: 05/16/21 2246    Lab Status: Final result Updated: 05/16/21 2246     Color, UA see results    Urine Macroscopic, POC [[id number]]  (Abnormal) Collected: 05/16/21 2239    Lab Status: Final result Specimen: Urine Updated: 05/16/21 2241     Color, UA Yellow     Clarity, UA Clear     pH, UA 6 0     Leukocytes, UA Trace     Nitrite, UA Negative     Protein, UA Negative mg/dl      Glucose, UA Negative mg/dl      Ketones, UA 15 (1+) mg/dl      Urobilinogen, UA 0 2 E U /dl      Bilirubin, UA Negative     Blood, UA Trace     Specific [place], UA 1 025    Narrative:      CLINITEK RESULT    hCG, qualitative pregnancy [[id number]]  (Normal) Collected: 05/16/21 2136    Lab Status: Final result Specimen: Blood from Arm, Left Updated: 05/16/21 2212     Preg, Serum Negative    Comprehensive metabolic panel [[id number]]  (Abnormal) Collected: 05/16/21 2136    Lab Status: Final result Specimen: Blood from Arm, Left Updated: 05/16/21 2211     Sodium 140 mmol/L      Potassium 4 1 mmol/L      Chloride 110 mmol/L      CO2 25 mmol/L      ANION GAP 5 mmol/L      BUN 12 mg/dL      Creatinine 0 76 mg/dL      Glucose 103 mg/dL      Calcium 9 3 mg/dL      AST 14 U/L      ALT 27 U/L      Alkaline Phosphatase 67 U/L      Total Protein 7 7 g/dL      Albumin 3 8 g/dL      Total Bilirubin 0 24 mg/dL      eGFR 107 ml/min/1 73sq m     Narrative:      [place] guidelines for Chronic Kidney Disease (CKD):     Stage 1 with normal or high GFR (GFR > 90 mL/min/1 73 square meters)    Stage 2 Mild CKD (GFR = 60-89 mL/min/1 73 square meters)    Stage 3A Moderate CKD (GFR = 45-59 mL/min/1 73 square meters)    Stage 3B Moderate CKD (GFR = 30-44 mL/min/1 73 square meters)    Stage 4 Severe CKD (GFR = 15-29 mL/min/1 73 square meters)    Stage 5 End Stage CKD (GFR <15 mL/min/1 73 square meters)  Note: GFR calculation is accurate only with a steady state creatinine    Lipase [[id number]]  (Abnormal) Collected: 05/16/21 2136    Lab Status: Final result Specimen: Blood from Arm, Left Updated: 05/16/21 2211     Lipase 62 u/L     CBC and differential [[id number]]  (Abnormal) Collected: 05/16/21 2136    Lab Status: Final result Specimen: Blood from Arm, Left Updated: 05/16/21 2153     WBC 9 10 Thousand/uL      RBC 5 07 Million/uL      Hemoglobin 13 4 g/dL      Hematocrit 41 6 %      MCV 82 fL      MCH 26 4 pg      MCHC 32 2 g/dL      RDW 14 3 %      MPV 12 8 fL      Platelets 809 Thousands/uL      nRBC 0 /100 WBCs      Neutrophils Relative 81 %      Immat GRANS % 0 %      Lymphocytes Relative 12 %      Monocytes Relative 7 %      Eosinophils Relative 0 %      Basophils Relative 0 %      Neutrophils Absolute 7 36 Thousands/µL      Immature Grans Absolute 0 04 Thousand/uL      Lymphocytes Absolute 1 06 Thousands/µL      Monocytes Absolute 0 60 Thousand/µL      Eosinophils Absolute 0 02 Thousand/µL      Basophils Absolute 0 02 Thousands/µL                  CT abdomen pelvis with contrast   Final Result by Saumya Webster DO (05/17 0204)      Mild wall thickening of the descending colon, probably exaggerated by underdistention although a mild colitis is also considered  No evidence of bowel obstruction  Other findings as above        Workstation performed: [id number]               Procedures  Procedures      ED Course  ED Course as of May 18 1502   Mon May 17, 2021   0052 Dispo pending CT scan, if negative treat for pyelonephritis with keflex                                          MDM  Number of Diagnoses or Management Options  Abdominal pain:   Pyelonephritis:   Diagnosis management comments: Patient is a 42-year-old female presents for evaluation of abdominal pain, vomiting, diarrhea  Patient was relatively benign abdominal exam   Vital signs within normal limits  The blood work unremarkable  Urinalysis with questionable urinary tract infection and right upper quadrant/flank pain will treat for pyelonephritis  Was signed out prior to imaging returns  Disposition  Final diagnoses:   Pyelonephritis   Abdominal pain     Time reflects when diagnosis was documented in both MDM as applicable and the Disposition within this note     Time User Action Codes Description Comment    5/17/2021  2:37 AM Adama Arena Add [N12] Pyelonephritis     5/17/2021  2:38 AM Adama Arena Add [R10 9] Abdominal pain       ED Disposition     ED Disposition Condition Date/Time Comment    Discharge Stable Mon May 17, 2021  2:37 AM Miracle Trujillo discharge to home/self care              Follow-up Information     Follow up With Specialties Details Why Melissa Thakkar, DO Internal Medicine Schedule an appointment as soon as possible for a visit in 2 days  [address]  [place] [address]  [phone]            Discharge Medication List as of 5/17/2021  2:38 AM      START taking these medications    Details   cephalexin (KEFLEX) 500 mg capsule Take 1 capsule (500 mg total) by mouth every 6 (six) hours for 10 days, Starting Mon 5/17/2021, Until Thu 5/27/2021, Normal         CONTINUE these medications which have NOT CHANGED    Details   ALPRAZolam (XANAX) 0 25 mg tablet Take 1 tablet (0 25 mg total) by mouth daily as needed for anxiety, Starting Fri 10/11/2019, Normal      cetirizine (ZyrTEC) 10 mg tablet Take 10 mg by mouth as needed , Historical Med      dicyclomine (BENTYL) 20 mg tablet Take 1 tablet (20 mg total) by mouth every 6 (six) hours as needed (abd pain), Starting Thu 8/6/2020, Normal      Multiple Vitamin (MULTIVITAMIN) tablet Take 1 tablet by mouth daily, Historical Med No discharge procedures on file  PDMP Review       Value Time User    PDMP Reviewed  Yes 10/11/2019  9:42 AM Valery Llamas DO           ED Provider  Attending physically available and evaluated Miracle Ronnie ELIZONDO managed the patient along with the ED Attending      Electronically Signed by         Queta Hassan MD  05/18/21 0404

## 2021-05-20 ENCOUNTER — OFFICE VISIT (OUTPATIENT)
Dept: INTERNAL MEDICINE CLINIC | Age: 26
End: 2021-05-20
Payer: COMMERCIAL

## 2021-05-20 VITALS
DIASTOLIC BLOOD PRESSURE: 68 MMHG | OXYGEN SATURATION: 100 % | BODY MASS INDEX: 45.15 KG/M2 | HEIGHT: 65 IN | HEART RATE: 80 BPM | TEMPERATURE: 97.7 F | WEIGHT: 271 LBS | SYSTOLIC BLOOD PRESSURE: 128 MMHG

## 2021-05-20 DIAGNOSIS — M54.6 ACUTE MIDLINE THORACIC BACK PAIN: ICD-10-CM

## 2021-05-20 DIAGNOSIS — K21.9 GASTROESOPHAGEAL REFLUX DISEASE, UNSPECIFIED WHETHER ESOPHAGITIS PRESENT: Primary | ICD-10-CM

## 2021-05-20 PROBLEM — K29.00 ACUTE GASTRITIS WITHOUT HEMORRHAGE: Status: ACTIVE | Noted: 2021-05-20

## 2021-05-20 PROCEDURE — 99214 OFFICE O/P EST MOD 30 MIN: CPT | Performed by: INTERNAL MEDICINE

## 2021-05-20 RX ORDER — PANTOPRAZOLE SODIUM 40 MG/1
40 TABLET, DELAYED RELEASE ORAL
Qty: 60 TABLET | Refills: 1 | Status: SHIPPED | OUTPATIENT
Start: 2021-05-20 | End: 2021-12-10 | Stop reason: CLARIF

## 2021-05-20 RX ORDER — CYCLOBENZAPRINE HCL 10 MG
10 TABLET ORAL
Qty: 7 TABLET | Refills: 0 | Status: SHIPPED | OUTPATIENT
Start: 2021-05-20

## 2021-05-20 NOTE — PROGRESS NOTES
Assessment/Plan:    GERD /gastritis  -   Will start patient on pantoprazole 40 mg in the a m  and she was counseled to take it on an empty stomach at least 30 minutes prior to any other oral intake  - patient was counseled to keep a food diarrhea and to avoid foods that trigger or worsen her symptoms  -avoid NSAIDs   - avoid skipping meals  Or call the office if symptoms do not resolve    Acute midline thoracic back pain  - will start patient on Voltaren gel 2% to be applied to the area of the pain and she may use over-the-counter Tylenol for her back pain  -will also start patient on Flexeril 10 mg at nighttime     Diagnoses and all orders for this visit:    Gastroesophageal reflux disease, unspecified whether esophagitis present  -     pantoprazole (PROTONIX) 40 mg tablet; Take 1 tablet (40 mg total) by mouth daily before breakfast    Acute midline thoracic back pain  -     Diclofenac Sodium (VOLTAREN) 1 %; Apply 2 g topically 4 (four) times a day  -     cyclobenzaprine (FLEXERIL) 10 mg tablet; Take 1 tablet (10 mg total) by mouth daily at bedtime    BMI 45 0-49 9, adult ([facility])          BMI Counseling: Body mass index is 45 1 kg/m²  The BMI is above normal  Nutrition recommendations include limiting drinks that contain sugar, reducing intake of saturated and trans fat and reducing intake of cholesterol  Exercise recommendations include moderate physical activity 150 minutes/week  No pharmacotherapy was ordered  Patient referred to PCP due to patient being overweight  Subjective:      Patient ID: Sabrina Palm is a 27 y o  female  HPI  Presents for a follow-up visit from the hospital   She was at [facility] emergency department  on May 16th, 2021 with abdominal pain and was diagnosed with pyelonephritis and given Keflex which she still taking  She states that she is concerned because her pain has not improved much    She states that when she went to the hospital she had pain in her epigastric region radiating to the right upper quadrant at the grade of 8/10 with associated nausea and diarrhea  She states that her pain is currently a 6/10 but nausea and diarrhea have resolved  Right now, her pain is more in the epigastric region and radiates sometimes to the right upper quadrant and she admits to occasional heartburn  She describes the pain as somewhat sharp sometimes  She still has back pain in the lower thoracic back  She denies urinary frequency, nocturia, urgency, small volume urine, feeling of incomplete emptying and hematuria  She denies fever, chills, night sweats, chest pain, shortness of breath, palpitations, dizziness or headaches  She states that she does not take NSAIDs  She admits that she recently has been under stress especially at work and drinks only 1 cup of coffee a day  She does not drink alcohol and does not smoke  She states that she eats 3 meals a day and does not skip meals  She denies dark tarry stools or rectal bleeding  The following portions of the patient's history were reviewed and updated as appropriate:   She  has a past medical history of Allergic, Anxiety, and Pyelonephritis (05/16/2021)    She   Patient Active Problem List    Diagnosis Date Noted    Acute gastritis without hemorrhage 05/20/2021    Gastroesophageal reflux disease 10/21/2020    Left upper quadrant abdominal pain 06/11/2020    Epigastric pain 06/11/2020    Vaginal discharge 01/15/2020    Women's annual routine gynecological examination 01/14/2020    BMI 45 0-49 9, adult ([facility] ) 11/01/2019    Hemolytic anemia ([facility] ) 10/16/2019    Need for influenza vaccination 10/12/2019    Cholecystitis 09/18/2019    Idiopathic thrombocytopenic purpura (ITP) ([facility]) 09/11/2019    Hypokalemia 09/11/2019    Petechiae 09/03/2019    Thrombocytopenia ([facility] ) 09/03/2019    Seasonal allergies 07/01/2019    Other specified anxiety disorders 07/01/2019    Morbid obesity ([facility] ) 07/01/2019     She  has a past surgical history that includes [place] tooth extraction and CHOLECYSTECTOMY LAPAROSCOPIC (N/A, 9/19/2019)  Her family history includes Anxiety disorder in her maternal grandmother; Diabetes in her paternal aunt and paternal uncle; Hypertension in her father; Psoriasis in her paternal uncle  She  reports that she has never smoked  She has never used smokeless tobacco  She reports current alcohol use  She reports that she does not use drugs  Current Outpatient Medications   Medication Sig Dispense Refill    ALPRAZolam (XANAX) 0 25 mg tablet Take 1 tablet (0 25 mg total) by mouth daily as needed for anxiety 10 tablet 0    cephalexin (KEFLEX) 500 mg capsule Take 1 capsule (500 mg total) by mouth every 6 (six) hours for 10 days 40 capsule 0    cetirizine (ZyrTEC) 10 mg tablet Take 10 mg by mouth as needed       cyclobenzaprine (FLEXERIL) 10 mg tablet Take 1 tablet (10 mg total) by mouth daily at bedtime 7 tablet 0    Diclofenac Sodium (VOLTAREN) 1 % Apply 2 g topically 4 (four) times a day 2 g 0    dicyclomine (BENTYL) 20 mg tablet Take 1 tablet (20 mg total) by mouth every 6 (six) hours as needed (abd pain) (Patient not taking: Reported on 5/16/2021) 120 tablet 5    Multiple Vitamin (MULTIVITAMIN) tablet Take 1 tablet by mouth daily      pantoprazole (PROTONIX) 40 mg tablet Take 1 tablet (40 mg total) by mouth daily before breakfast 60 tablet 1     No current facility-administered medications for this visit        Current Outpatient Medications on File Prior to Visit   Medication Sig    ALPRAZolam (XANAX) 0 25 mg tablet Take 1 tablet (0 25 mg total) by mouth daily as needed for anxiety    cephalexin (KEFLEX) 500 mg capsule Take 1 capsule (500 mg total) by mouth every 6 (six) hours for 10 days    cetirizine (ZyrTEC) 10 mg tablet Take 10 mg by mouth as needed     dicyclomine (BENTYL) 20 mg tablet Take 1 tablet (20 mg total) by mouth every 6 (six) hours as needed (abd pain) (Patient not taking: Reported on 5/16/2021)    Multiple Vitamin (MULTIVITAMIN) tablet Take 1 tablet by mouth daily     No current facility-administered medications on file prior to visit  She is allergic to pollen extract       Review of Systems   Constitutional: Negative for activity change, chills, fatigue, fever and unexpected weight change  HENT: Negative for ear pain, postnasal drip, rhinorrhea, sinus pressure and sore throat  Eyes: Negative for pain  Respiratory: Negative for cough, choking, chest tightness, shortness of breath and wheezing  Cardiovascular: Negative for chest pain, palpitations and leg swelling  Gastrointestinal: Positive for abdominal pain (epigastric abd pain, was a 9/10 and now a 6/10 and radiating to the back - a 6-7/10), diarrhea (A few days ago with epigastric abd pain) and nausea  Negative for constipation and vomiting  Genitourinary: Negative for decreased urine volume, difficulty urinating, dysuria, flank pain, frequency, hematuria, urgency and vaginal discharge  Musculoskeletal: Positive for back pain (crampy and occasionally dull back pain and close to the spine)  Negative for arthralgias, gait problem, joint swelling, myalgias and neck stiffness  Skin: Negative for pallor and rash  Neurological: Negative for dizziness, tremors, seizures, syncope, light-headedness and headaches  Hematological: Negative for adenopathy  Psychiatric/Behavioral: Negative for behavioral problems  Objective:      /68 (BP Location: Left arm, Patient Position: Sitting, Cuff Size: Adult)   Pulse 80   Temp 97 7 °F (36 5 °C) (Temporal)   Ht 5' 5" (1 651 m)   Wt 123 kg (271 lb)   SpO2 100% Comment: RA  BMI 45 10 kg/m²          Physical Exam  Constitutional:       General: She is not in acute distress  Appearance: She is well-developed  She is not diaphoretic  HENT:      Head: Normocephalic and atraumatic        Right Ear: External ear normal       Left Ear: External ear normal       Nose: Nose normal       Mouth/Throat:      Mouth: Mucous membranes are dry  Pharynx: No oropharyngeal exudate  Eyes:      General: No scleral icterus  Right eye: No discharge  Left eye: No discharge  Conjunctiva/sclera: Conjunctivae normal       Pupils: Pupils are equal, round, and reactive to light  Neck:      Musculoskeletal: Normal range of motion and neck supple  Thyroid: No thyromegaly  Vascular: No JVD  Trachea: No tracheal deviation  Cardiovascular:      Rate and Rhythm: Normal rate and regular rhythm  Heart sounds: Normal heart sounds  No murmur  No friction rub  No gallop  Pulmonary:      Effort: Pulmonary effort is normal  No respiratory distress  Breath sounds: Normal breath sounds  No wheezing or rales  Chest:      Chest wall: No tenderness  Abdominal:      General: Bowel sounds are normal  There is no distension  Palpations: Abdomen is soft  There is no mass  Tenderness: There is abdominal tenderness in the right upper quadrant and epigastric area  There is no guarding or rebound  Musculoskeletal: Normal range of motion  General: No deformity  Thoracic back: She exhibits tenderness ( tenderness is worse on rotation to the left and side bending especially to the left) and spasm  Lymphadenopathy:      Cervical: No cervical adenopathy  Skin:     General: Skin is warm and dry  Coloration: Skin is not pale  Findings: No erythema or rash  Neurological:      Mental Status: She is alert and oriented to person, place, and time  Cranial Nerves: No cranial nerve deficit  Motor: No abnormal muscle tone  Coordination: Coordination normal       Deep Tendon Reflexes: Reflexes are normal and symmetric     Psychiatric:         Behavior: Behavior normal            Admission on 05/16/2021, Discharged on 05/17/2021   Component Date Value Ref Range Status    WBC 05/16/2021 9 10  4 31 - 10 16 Thousand/uL Final    RBC 05/16/2021 5 07  3 81 - 5 12 Million/uL Final    Hemoglobin 05/16/2021 13 4  11 5 - 15 4 g/dL Final    Hematocrit 05/16/2021 41 6  34 8 - 46 1 % Final    MCV 05/16/2021 82  82 - 98 fL Final    MCH 05/16/2021 26 4* 26 8 - 34 3 pg Final    MCHC 05/16/2021 32 2  31 4 - 37 4 g/dL Final    RDW 05/16/2021 14 3  11 6 - 15 1 % Final    MPV 05/16/2021 12 8* 8 9 - 12 7 fL Final    Platelets 91/39/6976 248  149 - 390 Thousands/uL Final    nRBC 05/16/2021 0  /100 WBCs Final    Neutrophils Relative 05/16/2021 81* 43 - 75 % Final    Immat GRANS % 05/16/2021 0  0 - 2 % Final    Lymphocytes Relative 05/16/2021 12* 14 - 44 % Final    Monocytes Relative 05/16/2021 7  4 - 12 % Final    Eosinophils Relative 05/16/2021 0  0 - 6 % Final    Basophils Relative 05/16/2021 0  0 - 1 % Final    Neutrophils Absolute 05/16/2021 7 36  1 85 - 7 62 Thousands/µL Final    Immature Grans Absolute 05/16/2021 0 04  0 00 - 0 20 Thousand/uL Final    Lymphocytes Absolute 05/16/2021 1 06  0 60 - 4 47 Thousands/µL Final    Monocytes Absolute 05/16/2021 0 60  0 17 - 1 22 Thousand/µL Final    Eosinophils Absolute 05/16/2021 0 02  0 00 - 0 61 Thousand/µL Final    Basophils Absolute 05/16/2021 0 02  0 00 - 0 10 Thousands/µL Final    Sodium 05/16/2021 140  136 - 145 mmol/L Final    Potassium 05/16/2021 4 1  3 5 - 5 3 mmol/L Final    Chloride 05/16/2021 110* 100 - 108 mmol/L Final    CO2 05/16/2021 25  21 - 32 mmol/L Final    ANION GAP 05/16/2021 5  4 - 13 mmol/L Final    BUN 05/16/2021 12  5 - 25 mg/dL Final    Creatinine 05/16/2021 0 76  0 60 - 1 30 mg/dL Final    Standardized to IDMS reference method    Glucose 05/16/2021 103  65 - 140 mg/dL Final    If the patient is fasting, the ADA then defines impaired fasting glucose as > 100 mg/dL and diabetes as > or equal to 123 mg/dL  Specimen collection should occur prior to Sulfasalazine administration due to the potential for falsely depressed results   Specimen collection should occur prior to Sulfapyridine administration due to the potential for falsely elevated results   Calcium 05/16/2021 9 3  8 3 - 10 1 mg/dL Final    AST 05/16/2021 14  5 - 45 U/L Final    Specimen collection should occur prior to Sulfasalazine administration due to the potential for falsely depressed results   ALT 05/16/2021 27  12 - 78 U/L Final    Specimen collection should occur prior to Sulfasalazine and/or Sulfapyridine administration due to the potential for falsely depressed results   Alkaline Phosphatase 05/16/2021 67  46 - 116 U/L Final    Total Protein 05/16/2021 7 7  6 4 - 8 2 g/dL Final    Albumin 05/16/2021 3 8  3 5 - 5 0 g/dL Final    Total Bilirubin 05/16/2021 0 24  0 20 - 1 00 mg/dL Final    Use of this assay is not recommended for patients undergoing treatment with eltrombopag due to the potential for falsely elevated results      eGFR 05/16/2021 107  ml/min/1 73sq m Final    Lipase 05/16/2021 62* 73 - 393 u/L Final    Preg, Serum 05/16/2021 Negative  Negative Final    Color, UA 05/16/2021 see results   Final    Color, UA 05/16/2021 Yellow   Final    Clarity, UA 05/16/2021 Clear   Final    pH, UA 05/16/2021 6 0  4 5 - 8 0 Final    Leukocytes, UA 05/16/2021 Trace* Negative Final    Nitrite, UA 05/16/2021 Negative  Negative Final    Protein, UA 05/16/2021 Negative  Negative mg/dl Final    Glucose, UA 05/16/2021 Negative  Negative mg/dl Final    Ketones, UA 05/16/2021 15 (1+)* Negative mg/dl Final    Urobilinogen, UA 05/16/2021 0 2  0 2, 1 0 E U /dl E U /dl Final    Bilirubin, UA 05/16/2021 Negative  Negative Final    Blood, UA 05/16/2021 Trace* Negative Final    Specific Gravity, UA 05/16/2021 1 025  1 003 - 1 030 Final    RBC, UA 05/16/2021 2-4  None Seen, 2-4 /hpf Final    WBC, UA 05/16/2021 4-10* None Seen, 2-4 /hpf Final    Epithelial Cells 05/16/2021 None Seen  None Seen, Occasional /hpf Final    Bacteria, UA 05/16/2021 Occasional  None Seen, Occasional /hpf Final  Hyaline Casts, UA 05/16/2021 None Seen  None Seen /lpf Final   Hospital Outpatient Visit on 10/21/2020   Component Date Value Ref Range Status    EXT Preg Test, Ur 10/21/2020 Negative  Negative Final    Control 10/21/2020 Valid  Valid Final    Case Report 10/21/2020    Final                    Value:Surgical Pathology Report                         Case: [id number]                                   Authorizing Provider:  Dejuan Manzanares DO        Collected:           10/21/2020 1155              Ordering Location:     [facility]        Received[facility]            [facility]/2020 [address] [facility]                                                           Pathologist:           Onur Clinton MD                                                                Specimen:    Colon, cold forcep bx  - Random colon                                                       Final Diagnosis 10/21/2020    Final                    Value: This result contains rich text formatting which cannot be displayed here   Additional Information 10/21/2020    Final                    Value: This result contains rich text formatting which cannot be displayed here  Giuliano Rios Description 10/21/2020    Final                    Value: This result contains rich text formatting which cannot be displayed here      POC Glucose 10/21/2020 140  65 - 140 mg/dl Final   Appointment on 06/16/2020   Component Date Value Ref Range Status    H pylori Ag, Stl 06/16/2020 Negative  Negative Final   Appointment on 06/16/2020   Component Date Value Ref Range Status    Sodium 06/16/2020 139  136 - 145 mmol/L Final    Potassium 06/16/2020 3 7  3 5 - 5 3 mmol/L Final    Chloride 06/16/2020 108  100 - 108 mmol/L Final    CO2 06/16/2020 24  21 - 32 mmol/L Final    ANION GAP 06/16/2020 7  4 - 13 mmol/L Final    BUN 06/16/2020 11  5 - 25 mg/dL Final    Creatinine 06/16/2020 0 78  0 60 - 1 30 mg/dL Final    Standardized to IDMS reference method    Glucose, Fasting 06/16/2020 91  65 - 99 mg/dL Final      Specimen collection should occur prior to Sulfasalazine administration due to the potential for falsely depressed results  Specimen collection should occur prior to Sulfapyridine administration due to the potential for falsely elevated results   Calcium 06/16/2020 9 2  8 3 - 10 1 mg/dL Final    AST 06/16/2020 26  5 - 45 U/L Final      Specimen collection should occur prior to Sulfasalazine administration due to the potential for falsely depressed results   ALT 06/16/2020 94* 12 - 78 U/L Final      Specimen collection should occur prior to Sulfasalazine and/or Sulfapyridine administration due to the potential for falsely depressed results   Alkaline Phosphatase 06/16/2020 81  46 - 116 U/L Final    Total Protein 06/16/2020 7 0  6 4 - 8 2 g/dL Final    Albumin 06/16/2020 3 6  3 5 - 5 0 g/dL Final    Total Bilirubin 06/16/2020 0 49  0 20 - 1 00 mg/dL Final      Use of this assay is not recommended for patients undergoing treatment with eltrombopag due to the potential for falsely elevated results      eGFR 06/16/2020 105  ml/min/1 73sq m Final

## 2021-05-26 ENCOUNTER — TELEPHONE (OUTPATIENT)
Dept: INTERNAL MEDICINE CLINIC | Age: 26
End: 2021-05-26

## 2021-05-26 DIAGNOSIS — M54.6 ACUTE MIDLINE THORACIC BACK PAIN: ICD-10-CM

## 2021-06-03 ENCOUNTER — OFFICE VISIT (OUTPATIENT)
Dept: INTERNAL MEDICINE CLINIC | Age: 26
End: 2021-06-03
Payer: COMMERCIAL

## 2021-06-03 VITALS
HEIGHT: 65 IN | WEIGHT: 275.8 LBS | DIASTOLIC BLOOD PRESSURE: 78 MMHG | BODY MASS INDEX: 45.95 KG/M2 | HEART RATE: 64 BPM | SYSTOLIC BLOOD PRESSURE: 140 MMHG | TEMPERATURE: 98.2 F | OXYGEN SATURATION: 99 %

## 2021-06-03 DIAGNOSIS — F41.8 OTHER SPECIFIED ANXIETY DISORDERS: ICD-10-CM

## 2021-06-03 DIAGNOSIS — R10.13 EPIGASTRIC PAIN: ICD-10-CM

## 2021-06-03 DIAGNOSIS — M54.6 ACUTE MIDLINE THORACIC BACK PAIN: ICD-10-CM

## 2021-06-03 DIAGNOSIS — K21.9 GASTROESOPHAGEAL REFLUX DISEASE, UNSPECIFIED WHETHER ESOPHAGITIS PRESENT: Primary | ICD-10-CM

## 2021-06-03 PROCEDURE — 1036F TOBACCO NON-USER: CPT | Performed by: INTERNAL MEDICINE

## 2021-06-03 PROCEDURE — 3725F SCREEN DEPRESSION PERFORMED: CPT | Performed by: INTERNAL MEDICINE

## 2021-06-03 PROCEDURE — 99214 OFFICE O/P EST MOD 30 MIN: CPT | Performed by: INTERNAL MEDICINE

## 2021-06-03 PROCEDURE — 3008F BODY MASS INDEX DOCD: CPT | Performed by: INTERNAL MEDICINE

## 2021-06-03 RX ORDER — ALPRAZOLAM 0.25 MG/1
0.25 TABLET ORAL DAILY PRN
Qty: 10 TABLET | Refills: 0 | Status: SHIPPED | OUTPATIENT
Start: 2021-06-03

## 2021-06-03 NOTE — PATIENT INSTRUCTIONS
WHEN YOU  YOUR NEXT DOSE OF PANTOPRAZOLE, START TAKING IT EVERY OTHER DAY FOR 2 WEEKS, THEN START TAKING EVERY THIRD DAY FOR 2 WEEKS AND THEN EVERY FOURTH DAY FOR TWO WEEKS AND THEN STOP  CALL ME IF SYMPTOMS REOCCUR  AND YOU CAN ALSO TRY OVER THE COUNTER PEPCID

## 2021-06-03 NOTE — PROGRESS NOTES
Assessment/Plan:    GERD   -much improved   -continue with pantoprazole  -patient was counseled not to use the pantoprazole beyond 2-3 months  - she was counseled to taper herself off after the 2nd month by taking the pantoprazole every other day for 2 weeks, followed by every 3rd day for 2 weeks, followed by every 4th day for 2 weeks and then stop  She was counseled to call me if her symptoms worsen and she may also try over-the-counter famotidine/ Pepcid     -patient was counseled to avoid diets and behaviors that trigger symptoms    Acute midline thoracic back pain   - improving and related to muscle strain of the back   - she was counseled to try to avoid stressing her back  -she may use the Voltaren gel as needed    Anxiety disorder, situational  - will give patient a few tablets of alprazolam( 10) to be used as needed for her situational anxiety   - the [facility] web site was queried and did not show misuse  -return in 3 months for an annual physical exam     Diagnoses and all orders for this visit:    Gastroesophageal reflux disease, unspecified whether esophagitis present    Other specified anxiety disorders  -     ALPRAZolam (XANAX) 0 25 mg tablet; Take 1 tablet (0 25 mg total) by mouth daily as needed for anxiety    Epigastric pain    Acute midline thoracic back pain          Subjective:      Patient ID: Candida Love is a 27 y o  female  HPI  Patient presents for a follow-up visit regarding her abdominal pain and back pain  She states that she has been taking the pantoprazole and her abdominal pain is almost completely resolved  She also states that her back pain has improved  She states that she believes that what causes her back pain is carrying her little relative about and rocking the baby up and down to try to make him/her sleep    She states that she was with her sister and carried the baby yesterday and her pain reoccurred today and prior to that the pain had improved a great deal  She also states that she had spent a lot of time with her sister prior to onset of her back pain  Pain is currently 5/10 and nonradiating  She denies fever, chills, night sweats, chest pain, shortness of breath, palpitations, nausea, vomiting, diarrhea, constipation  She states that she would like a refill of her Xanax for her anxiety disorder  Of note, her anxiety disorder occurs usually when she is in special situations such as in weddings and parties or when she travels  She is about to travel and will need the Xanax  She goes for months without needing to use it  The following portions of the patient's history were reviewed and updated as appropriate:   She  has a past medical history of Allergic, Anxiety, and Pyelonephritis (05/16/2021)  She   Patient Active Problem List    Diagnosis Date Noted    Acute midline thoracic back pain 06/03/2021    Acute gastritis without hemorrhage 05/20/2021    Gastroesophageal reflux disease 10/21/2020    Left upper quadrant abdominal pain 06/11/2020    Epigastric pain 06/11/2020    Vaginal discharge 01/15/2020    Women's annual routine gynecological examination 01/14/2020    [facility] 45 0-49 9[facility] 11/01/2019    Hemolytic anemia ([facility] ) 10/16/2019    Need for influenza vaccination 10/12/2019    Cholecystitis 09/18/2019    Idiopathic thrombocytopenic purpura (ITP) ([facility]) 09/11/2019    Hypokalemia 09/11/2019    Petechiae 09/03/2019    Thrombocytopenia ([facility] ) 09/03/2019    Seasonal allergies 07/01/2019    Other specified anxiety disorders 07/01/2019    Morbid obesity ([facility] [address] ) 07/01/2019     She  has a past surgical history that includes [place] tooth extraction and CHOLECYSTECTOMY LAPAROSCOPIC (N/A, 9/19/2019)  Her family history includes Anxiety disorder in her maternal grandmother; Diabetes in her paternal aunt and paternal uncle; Hypertension in her father; Psoriasis in her paternal uncle  She  reports that she has never smoked   She has never used smokeless tobacco  She reports current alcohol use  She reports that she does not use drugs  Current Outpatient Medications   Medication Sig Dispense Refill    ALPRAZolam (XANAX) 0 25 mg tablet Take 1 tablet (0 25 mg total) by mouth daily as needed for anxiety 10 tablet 0    cetirizine (ZyrTEC) 10 mg tablet Take 10 mg by mouth as needed       cyclobenzaprine (FLEXERIL) 10 mg tablet Take 1 tablet (10 mg total) by mouth daily at bedtime 7 tablet 0    Diclofenac Sodium (VOLTAREN) 1 % Apply 4 g topically 4 (four) times a day 150 g 0    Multiple Vitamin (MULTIVITAMIN) tablet Take 1 tablet by mouth daily      pantoprazole (PROTONIX) 40 mg tablet Take 1 tablet (40 mg total) by mouth daily before breakfast 60 tablet 1    dicyclomine (BENTYL) 20 mg tablet Take 1 tablet (20 mg total) by mouth every 6 (six) hours as needed (abd pain) (Patient not taking: Reported on 5/16/2021) 120 tablet 5     No current facility-administered medications for this visit  Current Outpatient Medications on File Prior to Visit   Medication Sig    cetirizine (ZyrTEC) 10 mg tablet Take 10 mg by mouth as needed     cyclobenzaprine (FLEXERIL) 10 mg tablet Take 1 tablet (10 mg total) by mouth daily at bedtime    Diclofenac Sodium (VOLTAREN) 1 % Apply 4 g topically 4 (four) times a day    Multiple Vitamin (MULTIVITAMIN) tablet Take 1 tablet by mouth daily    pantoprazole (PROTONIX) 40 mg tablet Take 1 tablet (40 mg total) by mouth daily before breakfast    [DISCONTINUED] ALPRAZolam (XANAX) 0 25 mg tablet Take 1 tablet (0 25 mg total) by mouth daily as needed for anxiety    dicyclomine (BENTYL) 20 mg tablet Take 1 tablet (20 mg total) by mouth every 6 (six) hours as needed (abd pain) (Patient not taking: Reported on 5/16/2021)     No current facility-administered medications on file prior to visit  She is allergic to pollen extract       Review of Systems   Constitutional: Negative for activity change, chills, fatigue, fever and unexpected weight change  HENT: Negative for ear pain, postnasal drip, rhinorrhea, sinus pressure and sore throat  Eyes: Negative for pain  Respiratory: Negative for cough, choking, chest tightness, shortness of breath and wheezing  Cardiovascular: Negative for chest pain, palpitations and leg swelling  Gastrointestinal: Positive for abdominal pain (epigastric abd pain is almost completely resolved)  Negative for constipation, diarrhea, nausea and vomiting  Genitourinary: Negative for dysuria and hematuria  Musculoskeletal: Positive for back pain (much improved)  Negative for arthralgias, gait problem, joint swelling, myalgias and neck stiffness  Skin: Negative for pallor and rash  Neurological: Negative for dizziness, tremors, seizures, syncope, light-headedness and headaches  Hematological: Negative for adenopathy  Psychiatric/Behavioral: Negative for behavioral problems  The patient is nervous/anxious (Anxiety is usually triggered by big events )  Objective:      /78 (BP Location: Left arm, Patient Position: Sitting, Cuff Size: Large)   Pulse 64   Temp 98 2 °F (36 8 °C) (Temporal)   Ht 5' 5" (1 651 m)   Wt 125 kg (275 lb 12 8 oz)   SpO2 99%   BMI 45 90 kg/m²          Physical Exam  Constitutional:       General: She is not in acute distress  Appearance: She is well-developed  She is not diaphoretic  HENT:      Head: Normocephalic and atraumatic  Right Ear: External ear normal       Left Ear: External ear normal       Nose: Nose normal       Mouth/Throat:      Mouth: Mucous membranes are dry  Pharynx: Posterior oropharyngeal erythema (Oropharyngeal erythema with dry mucous membranes) present  No oropharyngeal exudate  Eyes:      General: No scleral icterus  Right eye: No discharge  Left eye: No discharge  Conjunctiva/sclera: Conjunctivae normal       Pupils: Pupils are equal, round, and reactive to light     Neck: Musculoskeletal: Normal range of motion and neck supple  Thyroid: No thyromegaly  Vascular: No JVD  Trachea: No tracheal deviation  Cardiovascular:      Rate and Rhythm: Normal rate and regular rhythm  Heart sounds: Normal heart sounds  No murmur  No friction rub  No gallop  Pulmonary:      Effort: Pulmonary effort is normal  No respiratory distress  Breath sounds: Normal breath sounds  No wheezing or rales  Chest:      Chest wall: No tenderness  Abdominal:      General: Bowel sounds are normal  There is no distension  Palpations: Abdomen is soft  There is no mass  Tenderness: There is abdominal tenderness in the epigastric area  There is no guarding or rebound  Musculoskeletal: Normal range of motion  General: No deformity  Thoracic back: She exhibits tenderness (tenderness in the R thoracic back and worse on side bending and rotation in both directions)  Lymphadenopathy:      Cervical: No cervical adenopathy  Skin:     General: Skin is warm and dry  Coloration: Skin is not pale  Findings: No erythema or rash  Neurological:      Mental Status: She is alert and oriented to person, place, and time  Cranial Nerves: No cranial nerve deficit  Motor: No abnormal muscle tone  Coordination: Coordination normal       Deep Tendon Reflexes: Reflexes are normal and symmetric     Psychiatric:         Behavior: Behavior normal            Admission on 05/16/2021, Discharged on 05/17/2021   Component Date Value Ref Range Status    WBC 05/16/2021 9 10  4 31 - 10 16 Thousand/uL Final    RBC 05/16/2021 5 07  3 81 - 5 12 Million/uL Final    Hemoglobin 05/16/2021 13 4  11 5 - 15 4 g/dL Final    Hematocrit 05/16/2021 41 6  34 8 - 46 1 % Final    MCV 05/16/2021 82  82 - 98 fL Final    MCH 05/16/2021 26 4* 26 8 - 34 3 pg Final    MCHC 05/16/2021 32 2  31 4 - 37 4 g/dL Final    RDW 05/16/2021 14 3  11 6 - 15 1 % Final    MPV 05/16/2021 12 8* 8 9 - 12 7 fL Final    Platelets 38/09/2797 248  149 - 390 Thousands/uL Final    nRBC 05/16/2021 0  /100 WBCs Final    Neutrophils Relative 05/16/2021 81* 43 - 75 % Final    Immat GRANS % 05/16/2021 0  0 - 2 % Final    Lymphocytes Relative 05/16/2021 12* 14 - 44 % Final    Monocytes Relative 05/16/2021 7  4 - 12 % Final    Eosinophils Relative 05/16/2021 0  0 - 6 % Final    Basophils Relative 05/16/2021 0  0 - 1 % Final    Neutrophils Absolute 05/16/2021 7 36  1 85 - 7 62 Thousands/µL Final    Immature Grans Absolute 05/16/2021 0 04  0 00 - 0 20 Thousand/uL Final    Lymphocytes Absolute 05/16/2021 1 06  0 60 - 4 47 Thousands/µL Final    Monocytes Absolute 05/16/2021 0 60  0 17 - 1 22 Thousand/µL Final    Eosinophils Absolute 05/16/2021 0 02  0 00 - 0 61 Thousand/µL Final    Basophils Absolute 05/16/2021 0 02  0 00 - 0 10 Thousands/µL Final    Sodium 05/16/2021 140  136 - 145 mmol/L Final    Potassium 05/16/2021 4 1  3 5 - 5 3 mmol/L Final    Chloride 05/16/2021 110* 100 - 108 mmol/L Final    CO2 05/16/2021 25  21 - 32 mmol/L Final    ANION GAP 05/16/2021 5  4 - 13 mmol/L Final    BUN 05/16/2021 12  5 - 25 mg/dL Final    Creatinine 05/16/2021 0 76  0 60 - 1 30 mg/dL Final    Standardized to IDMS reference method    Glucose 05/16/2021 103  65 - 140 mg/dL Final    If the patient is fasting, the ADA then defines impaired fasting glucose as > 100 mg/dL and diabetes as > or equal to 123 mg/dL  Specimen collection should occur prior to Sulfasalazine administration due to the potential for falsely depressed results  Specimen collection should occur prior to Sulfapyridine administration due to the potential for falsely elevated results   Calcium 05/16/2021 9 3  8 3 - 10 1 mg/dL Final    AST 05/16/2021 14  5 - 45 U/L Final    Specimen collection should occur prior to Sulfasalazine administration due to the potential for falsely depressed results       ALT 05/16/2021 27  12 - 78 U/L Final    Specimen collection should occur prior to Sulfasalazine and/or Sulfapyridine administration due to the potential for falsely depressed results   Alkaline Phosphatase 05/16/2021 67  46 - 116 U/L Final    Total Protein 05/16/2021 7 7  6 4 - 8 2 g/dL Final    Albumin 05/16/2021 3 8  3 5 - 5 0 g/dL Final    Total Bilirubin 05/16/2021 0 24  0 20 - 1 00 mg/dL Final    Use of this assay is not recommended for patients undergoing treatment with eltrombopag due to the potential for falsely elevated results      eGFR 05/16/2021 107  ml/min/1 73sq m Final    Lipase 05/16/2021 62* 73 - 393 u/L Final    Preg, Serum 05/16/2021 Negative  Negative Final    Color, UA 05/16/2021 see results   Final    Color, UA 05/16/2021 Yellow   Final    Clarity, UA 05/16/2021 Clear   Final    pH, UA 05/16/2021 6 0  4 5 - 8 0 Final    Leukocytes, UA 05/16/2021 Trace* Negative Final    Nitrite, UA 05/16/2021 Negative  Negative Final    Protein, UA 05/16/2021 Negative  Negative mg/dl Final    Glucose, UA 05/16/2021 Negative  Negative mg/dl Final    Ketones, UA 05/16/2021 15 (1+)* Negative mg/dl Final    Urobilinogen, UA 05/16/2021 0 2  0 2, 1 0 E U /dl E U /dl Final    Bilirubin, UA 05/16/2021 Negative  Negative Final    Blood, UA 05/16/2021 Trace* Negative Final    Specific Gravity, UA 05/16/2021 1 025  1 003 - 1 030 Final    RBC, UA 05/16/2021 2-4  None Seen, 2-4 /hpf Final    WBC, UA 05/16/2021 4-10* None Seen, 2-4 /hpf Final    Epithelial Cells 05/16/2021 None Seen  None Seen, Occasional /hpf Final    Bacteria, UA 05/16/2021 Occasional  None Seen, Occasional /hpf Final    Hyaline Casts, UA 05/16/2021 None Seen  None Seen /lpf Final   Hospital Outpatient Visit on 10/21/2020   Component Date Value Ref Range Status    EXT Preg Test, Ur 10/21/2020 Negative  Negative Final    Control 10/21/2020 Valid  Valid Final    Case Report 10/21/2020    Final                    Value:Surgical Pathology Report                         Case: [id number]                                   Authorizing Provider:  Lynsey Mcdonnell DO        Collected:           10/21/2020 1155              Ordering Location:     [facility]        Received:            10/21/2020 [address]                                                           Pathologist:           Jo-Ann Harris MD                                                                Specimen:    Colon, cold forcep bx  - Random colon                                                       Final Diagnosis 10/21/2020    Final                    Value: This result contains rich text formatting which cannot be displayed here   Additional Information 10/21/2020    Final                    Value: This result contains rich text formatting which cannot be displayed here  Art Jan Description 10/21/2020    Final                    Value: This result contains rich text formatting which cannot be displayed here   POC Glucose 10/21/2020 140  65 - 140 mg/dl Final   Appointment on 06/16/2020   Component Date Value Ref Range Status    H pylori Ag, Stl 06/16/2020 Negative  Negative Final   Appointment on 06/16/2020   Component Date Value Ref Range Status    Sodium 06/16/2020 139  136 - 145 mmol/L Final    Potassium 06/16/2020 3 7  3 5 - 5 3 mmol/L Final    Chloride 06/16/2020 108  100 - 108 mmol/L Final    CO2 06/16/2020 24  21 - 32 mmol/L Final    ANION GAP 06/16/2020 7  4 - 13 mmol/L Final    BUN 06/16/2020 11  5 - 25 mg/dL Final    Creatinine 06/16/2020 0 78  0 60 - 1 30 mg/dL Final    Standardized to IDMS reference method    Glucose, Fasting 06/16/2020 91  65 - 99 mg/dL Final      Specimen collection should occur prior to Sulfasalazine administration due to the potential for falsely depressed results  Specimen collection should occur prior to Sulfapyridine administration due to the potential for falsely elevated results      Calcium 06/16/2020 9 2  8 3 - 10 1 mg/dL Final    AST 06/16/2020 26  5 - 45 U/L Final      Specimen collection should occur prior to Sulfasalazine administration due to the potential for falsely depressed results   ALT 06/16/2020 94* 12 - 78 U/L Final      Specimen collection should occur prior to Sulfasalazine and/or Sulfapyridine administration due to the potential for falsely depressed results   Alkaline Phosphatase 06/16/2020 81  46 - 116 U/L Final    Total Protein 06/16/2020 7 0  6 4 - 8 2 g/dL Final    Albumin 06/16/2020 3 6  3 5 - 5 0 g/dL Final    Total Bilirubin 06/16/2020 0 49  0 20 - 1 00 mg/dL Final      Use of this assay is not recommended for patients undergoing treatment with eltrombopag due to the potential for falsely elevated results      eGFR 06/16/2020 105  ml/min/1 73sq m Final

## 2021-12-10 ENCOUNTER — AMB VIDEO VISIT (OUTPATIENT)
Dept: OTHER | Facility: HOSPITAL | Age: 26
End: 2021-12-10

## 2021-12-10 DIAGNOSIS — R05.9 COUGH: Primary | ICD-10-CM

## 2021-12-10 PROCEDURE — ECARE PR SL URGENT CARE VIRTUAL VISIT: Performed by: PHYSICIAN ASSISTANT

## 2021-12-10 RX ORDER — FAMOTIDINE 20 MG/1
20 TABLET, FILM COATED ORAL 2 TIMES DAILY
Qty: 28 TABLET | Refills: 0 | Status: SHIPPED | OUTPATIENT
Start: 2021-12-10

## 2021-12-10 RX ORDER — AMOXICILLIN 875 MG/1
875 TABLET, COATED ORAL 3 TIMES DAILY
Qty: 30 TABLET | Refills: 0 | Status: SHIPPED | OUTPATIENT
Start: 2021-12-10 | End: 2021-12-20

## 2021-12-10 RX ORDER — BENZONATATE 200 MG/1
200 CAPSULE ORAL 3 TIMES DAILY PRN
Qty: 20 CAPSULE | Refills: 0 | Status: SHIPPED | OUTPATIENT
Start: 2021-12-10

## 2022-03-29 ENCOUNTER — TELEPHONE (OUTPATIENT)
Dept: INTERNAL MEDICINE CLINIC | Facility: OTHER | Age: 27
End: 2022-03-29

## 2022-10-13 ENCOUNTER — OFFICE VISIT (OUTPATIENT)
Dept: INTERNAL MEDICINE CLINIC | Age: 27
End: 2022-10-13
Payer: COMMERCIAL

## 2022-10-13 ENCOUNTER — HOSPITAL ENCOUNTER (OUTPATIENT)
Dept: CT IMAGING | Facility: HOSPITAL | Age: 27
End: 2022-10-13
Payer: COMMERCIAL

## 2022-10-13 ENCOUNTER — APPOINTMENT (OUTPATIENT)
Dept: LAB | Facility: HOSPITAL | Age: 27
End: 2022-10-13
Payer: COMMERCIAL

## 2022-10-13 VITALS
TEMPERATURE: 97.8 F | DIASTOLIC BLOOD PRESSURE: 66 MMHG | WEIGHT: 284 LBS | SYSTOLIC BLOOD PRESSURE: 130 MMHG | HEIGHT: 65 IN | HEART RATE: 93 BPM | BODY MASS INDEX: 47.32 KG/M2 | OXYGEN SATURATION: 99 %

## 2022-10-13 DIAGNOSIS — R10.9 ACUTE ABDOMINAL PAIN: ICD-10-CM

## 2022-10-13 DIAGNOSIS — R19.7 NAUSEA VOMITING AND DIARRHEA: ICD-10-CM

## 2022-10-13 DIAGNOSIS — R11.2 NAUSEA VOMITING AND DIARRHEA: ICD-10-CM

## 2022-10-13 DIAGNOSIS — R10.31 RLQ ABDOMINAL PAIN: ICD-10-CM

## 2022-10-13 DIAGNOSIS — R93.5 ABNORMAL CT OF THE ABDOMEN: ICD-10-CM

## 2022-10-13 DIAGNOSIS — D69.3 ACUTE ITP (HCC): ICD-10-CM

## 2022-10-13 DIAGNOSIS — R10.13 EPIGASTRIC PAIN: ICD-10-CM

## 2022-10-13 DIAGNOSIS — R82.81 PYURIA: ICD-10-CM

## 2022-10-13 DIAGNOSIS — K52.9 COLITIS: ICD-10-CM

## 2022-10-13 DIAGNOSIS — R19.5 CLAY-COLORED STOOLS: ICD-10-CM

## 2022-10-13 DIAGNOSIS — R82.81 PYURIA: Primary | ICD-10-CM

## 2022-10-13 DIAGNOSIS — R10.31 RLQ ABDOMINAL PAIN: Primary | ICD-10-CM

## 2022-10-13 LAB
ALBUMIN SERPL BCP-MCNC: 4.2 G/DL (ref 3.5–5)
ALP SERPL-CCNC: 53 U/L (ref 34–104)
ALT SERPL W P-5'-P-CCNC: 20 U/L (ref 7–52)
ANION GAP SERPL CALCULATED.3IONS-SCNC: 9 MMOL/L (ref 4–13)
AST SERPL W P-5'-P-CCNC: 16 U/L (ref 13–39)
BASOPHILS # BLD AUTO: 0.02 THOUSANDS/ΜL (ref 0–0.1)
BASOPHILS NFR BLD AUTO: 0 % (ref 0–1)
BILIRUB SERPL-MCNC: 0.38 MG/DL (ref 0.2–1)
BUN SERPL-MCNC: 6 MG/DL (ref 5–25)
CALCIUM SERPL-MCNC: 9.5 MG/DL (ref 8.4–10.2)
CHLORIDE SERPL-SCNC: 103 MMOL/L (ref 96–108)
CO2 SERPL-SCNC: 25 MMOL/L (ref 21–32)
CREAT SERPL-MCNC: 0.64 MG/DL (ref 0.6–1.3)
EOSINOPHIL # BLD AUTO: 0 THOUSAND/ΜL (ref 0–0.61)
EOSINOPHIL NFR BLD AUTO: 0 % (ref 0–6)
ERYTHROCYTE [DISTWIDTH] IN BLOOD BY AUTOMATED COUNT: 14.9 % (ref 11.6–15.1)
GFR SERPL CREATININE-BSD FRML MDRD: 120 ML/MIN/1.73SQ M
GLUCOSE P FAST SERPL-MCNC: 94 MG/DL (ref 65–99)
HCT VFR BLD AUTO: 38.5 % (ref 34.8–46.1)
HGB BLD-MCNC: 12.4 G/DL (ref 11.5–15.4)
IMM GRANULOCYTES # BLD AUTO: 0.02 THOUSAND/UL (ref 0–0.2)
IMM GRANULOCYTES NFR BLD AUTO: 0 % (ref 0–2)
LIPASE SERPL-CCNC: 9 U/L (ref 11–82)
LYMPHOCYTES # BLD AUTO: 1.36 THOUSANDS/ΜL (ref 0.6–4.47)
LYMPHOCYTES NFR BLD AUTO: 19 % (ref 14–44)
MCH RBC QN AUTO: 25 PG (ref 26.8–34.3)
MCHC RBC AUTO-ENTMCNC: 32.2 G/DL (ref 31.4–37.4)
MCV RBC AUTO: 78 FL (ref 82–98)
MONOCYTES # BLD AUTO: 0.4 THOUSAND/ΜL (ref 0.17–1.22)
MONOCYTES NFR BLD AUTO: 6 % (ref 4–12)
NEUTROPHILS # BLD AUTO: 5.53 THOUSANDS/ΜL (ref 1.85–7.62)
NEUTS SEG NFR BLD AUTO: 75 % (ref 43–75)
NRBC BLD AUTO-RTO: 0 /100 WBCS
PLATELET # BLD AUTO: 295 THOUSANDS/UL (ref 149–390)
PMV BLD AUTO: 11.8 FL (ref 8.9–12.7)
POTASSIUM SERPL-SCNC: 3.6 MMOL/L (ref 3.5–5.3)
PROT SERPL-MCNC: 7.2 G/DL (ref 6.4–8.4)
RBC # BLD AUTO: 4.96 MILLION/UL (ref 3.81–5.12)
SL AMB  POCT GLUCOSE, UA: ABNORMAL
SL AMB LEUKOCYTE ESTERASE,UA: ABNORMAL
SL AMB POCT BILIRUBIN,UA: ABNORMAL
SL AMB POCT BLOOD,UA: ABNORMAL
SL AMB POCT CLARITY,UA: ABNORMAL
SL AMB POCT COLOR,UA: YELLOW
SL AMB POCT KETONES,UA: 40
SL AMB POCT NITRITE,UA: ABNORMAL
SL AMB POCT PH,UA: 6
SL AMB POCT SPECIFIC GRAVITY,UA: 1.02
SL AMB POCT URINE PROTEIN: ABNORMAL
SL AMB POCT UROBILINOGEN: 0.2
SODIUM SERPL-SCNC: 137 MMOL/L (ref 135–147)
WBC # BLD AUTO: 7.33 THOUSAND/UL (ref 4.31–10.16)

## 2022-10-13 PROCEDURE — 81003 URINALYSIS AUTO W/O SCOPE: CPT | Performed by: PHYSICIAN ASSISTANT

## 2022-10-13 PROCEDURE — 74177 CT ABD & PELVIS W/CONTRAST: CPT

## 2022-10-13 PROCEDURE — 80053 COMPREHEN METABOLIC PANEL: CPT

## 2022-10-13 PROCEDURE — 80074 ACUTE HEPATITIS PANEL: CPT

## 2022-10-13 PROCEDURE — 99214 OFFICE O/P EST MOD 30 MIN: CPT | Performed by: PHYSICIAN ASSISTANT

## 2022-10-13 PROCEDURE — 87147 CULTURE TYPE IMMUNOLOGIC: CPT | Performed by: PHYSICIAN ASSISTANT

## 2022-10-13 PROCEDURE — 87086 URINE CULTURE/COLONY COUNT: CPT | Performed by: PHYSICIAN ASSISTANT

## 2022-10-13 PROCEDURE — 36415 COLL VENOUS BLD VENIPUNCTURE: CPT

## 2022-10-13 PROCEDURE — 85025 COMPLETE CBC W/AUTO DIFF WBC: CPT

## 2022-10-13 PROCEDURE — 83690 ASSAY OF LIPASE: CPT

## 2022-10-13 RX ORDER — AMOXICILLIN AND CLAVULANATE POTASSIUM 500; 125 MG/1; MG/1
1 TABLET, FILM COATED ORAL EVERY 12 HOURS SCHEDULED
Qty: 14 TABLET | Refills: 0 | Status: SHIPPED | OUTPATIENT
Start: 2022-10-13 | End: 2022-10-20

## 2022-10-13 RX ORDER — ONDANSETRON 4 MG/1
4 TABLET, FILM COATED ORAL EVERY 8 HOURS PRN
Qty: 30 TABLET | Refills: 0 | Status: SHIPPED | OUTPATIENT
Start: 2022-10-13 | End: 2022-11-01 | Stop reason: SDUPTHER

## 2022-10-13 RX ADMIN — IOHEXOL 100 ML: 350 INJECTION, SOLUTION INTRAVENOUS at 18:21

## 2022-10-13 NOTE — PROGRESS NOTES
Assessment/Plan:         Diagnoses and all orders for this visit:    RLQ abdominal pain  Comments:  r/o appy  CT and labs STAT  will f/u when resulted   Orders:  -     CT abdomen pelvis w contrast; Future  -     Cancel: CBC and differential; Future  -     Cancel: Comprehensive metabolic panel; Future  -     Cancel: Lipase; Future  -     CBC and differential; Future  -     Comprehensive metabolic panel; Future  -     Hepatitis panel, acute; Future  -     Lipase; Future  -     POCT urine dip auto non-scope    Nausea vomiting and diarrhea  -     CT abdomen pelvis w contrast; Future  -     Cancel: CBC and differential; Future  -     Cancel: Comprehensive metabolic panel; Future  -     Cancel: Lipase; Future  -     CBC and differential; Future  -     Comprehensive metabolic panel; Future  -     Hepatitis panel, acute; Future  -     Lipase; Future  -     POCT urine dip auto non-scope    Acute ITP (HCC)  -     POCT urine dip auto non-scope    Epigastric pain  -     Cancel: CBC and differential; Future  -     Cancel: Comprehensive metabolic panel; Future  -     Cancel: Lipase; Future  -     CBC and differential; Future  -     Comprehensive metabolic panel; Future  -     Hepatitis panel, acute; Future  -     Lipase; Future  -     POCT urine dip auto non-scope    Acute abdominal pain  -     CBC and differential; Future  -     Comprehensive metabolic panel; Future  -     Hepatitis panel, acute; Future  -     Lipase; Future  -     POCT urine dip auto non-scope    Atif-colored stools  -     CBC and differential; Future  -     Comprehensive metabolic panel; Future  -     Hepatitis panel, acute; Future  -     Lipase; Future    Pyuria  Comments:  check urine culture  Orders:  -     Urine culture; Future          Subjective:      Patient ID: Anais Guillaume is a 32 y o  female      33 Y/O female with c/o nausea, vomiting, starting suddenly on Tuesday - she states she ate a corn muffin and felt sick right away, left work and threw up on her way home  Pt states she felt better yesterday but again today she vomited  Pt reports pain is in the RUQ and epigastric area, feeling very bloated  Pt reports associated diarrhea on and off  "feels like gallstones" that she had before but had cholecystectomy 3 years ago     Pt had episode of ab pain in 3/22 went to [facility] ER   CT ab/pelvis negative   U/s pelvis showed fluid in cul de sac   Biliary ducts were not dilated at that time     Pt denies possibility of pregnancy, not currently sexually active     Denies fam hx of pancreatitis        The following portions of the patient's history were reviewed and updated as appropriate: allergies, current medications, past family history, past medical history, past social history, past surgical history and problem list     Review of Systems   Constitutional: Positive for activity change and appetite change  Negative for chills, diaphoresis and fever  HENT: Negative for congestion and sore throat  Respiratory: Negative for cough, shortness of breath and wheezing  Cardiovascular: Negative for chest pain and leg swelling  Gastrointestinal: Positive for abdominal distention, abdominal pain, diarrhea (light - watery stools), nausea and vomiting  Negative for blood in stool  Genitourinary: Negative for dysuria, flank pain and hematuria  Skin: Negative for rash  Neurological: Negative for dizziness and headaches  Psychiatric/Behavioral: Negative for sleep disturbance  The patient is not nervous/anxious            Past Medical History:   Diagnosis Date   • Allergic     Seasonal   • Anxiety    • Clotting disorder ([facility] [address] )    • Obesity    • Pyelonephritis 05/16/2021         Current Outpatient Medications:   •  ALPRAZolam (XANAX) 0 25 mg tablet, Take 1 tablet (0 25 mg total) by mouth daily as needed for anxiety, Disp: 10 tablet, Rfl: 0  •  cetirizine (ZyrTEC) 10 mg tablet, Take 10 mg by mouth as needed , Disp: , Rfl:   •  Multiple Vitamin (MULTIVITAMIN) tablet, Take 1 tablet by mouth daily, Disp: , Rfl:     Allergies   Allergen Reactions   • Pollen Extract        Social History   Past Surgical History:   Procedure Laterality Date   • CHOLECYSTECTOMY  9/19/2019   • CHOLECYSTECTOMY LAPAROSCOPIC N/A 09/19/2019    Procedure: CHOLECYSTECTOMY LAPAROSCOPIC;  Surgeon: Desire Cheung MD;  Location: [facility];  Service: General   • WISDOM TOOTH EXTRACTION       Family History   Problem Relation Age of Onset   • Hypertension Father    • Anxiety disorder Maternal Grandmother    • Diabetes Paternal Aunt    • Diabetes Paternal Uncle    • Psoriasis Paternal Uncle        Objective:  /66 (BP Location: Left arm, Patient Position: Sitting, Cuff Size: Large)   Pulse 93   Temp 97 8 °F (36 6 °C) (Temporal)   Ht 5' 5" (1 651 m)   Wt 129 kg (284 lb)   SpO2 99%   BMI 47 26 kg/m²        Physical Exam  Vitals reviewed  Constitutional:       General: She is not in acute distress  HENT:      Head: Normocephalic  Right Ear: Tympanic membrane, ear canal and external ear normal  There is no impacted cerumen  Left Ear: Tympanic membrane, ear canal and external ear normal  There is no impacted cerumen  Eyes:      General: No scleral icterus  Right eye: No discharge  Left eye: No discharge  Extraocular Movements: Extraocular movements intact  Conjunctiva/sclera: Conjunctivae normal       Pupils: Pupils are equal, round, and reactive to light  Cardiovascular:      Rate and Rhythm: Normal rate and regular rhythm  Pulmonary:      Effort: Pulmonary effort is normal  No respiratory distress  Breath sounds: Normal breath sounds  No wheezing or rales  Abdominal:      General: There is distension  Palpations: There is no mass  Tenderness: There is abdominal tenderness (RLQ, periumbilical )  There is guarding and rebound  Musculoskeletal:         General: Normal range of motion  Right lower leg: No edema  Left lower leg: No edema  Skin:     Findings: No erythema or rash  Neurological:      General: No focal deficit present  Mental Status: She is alert and oriented to person, place, and time     Psychiatric:         Mood and Affect: Mood normal

## 2022-10-14 ENCOUNTER — OFFICE VISIT (OUTPATIENT)
Dept: GASTROENTEROLOGY | Facility: CLINIC | Age: 27
End: 2022-10-14
Payer: COMMERCIAL

## 2022-10-14 VITALS
HEIGHT: 65 IN | SYSTOLIC BLOOD PRESSURE: 117 MMHG | HEART RATE: 74 BPM | BODY MASS INDEX: 47.48 KG/M2 | WEIGHT: 285 LBS | DIASTOLIC BLOOD PRESSURE: 89 MMHG

## 2022-10-14 DIAGNOSIS — K52.9 COLITIS: Primary | ICD-10-CM

## 2022-10-14 DIAGNOSIS — R93.5 ABNORMAL CT OF THE ABDOMEN: ICD-10-CM

## 2022-10-14 LAB
BACTERIA UR CULT: ABNORMAL
BACTERIA UR CULT: ABNORMAL
HAV IGM SER QL: NORMAL
HBV CORE IGM SER QL: NORMAL
HBV SURFACE AG SER QL: NORMAL
HCV AB SER QL: NORMAL

## 2022-10-14 PROCEDURE — 99204 OFFICE O/P NEW MOD 45 MIN: CPT | Performed by: PHYSICIAN ASSISTANT

## 2022-10-14 NOTE — PROGRESS NOTES
[facility] - Outpatient Consultation  Lavon Ferro 32 y o  female MRN: [id number]  Encounter: [id number]          ASSESSMENT AND PLAN:      1  Colitis  Patient CT scan showing left-sided colitis and patient was having acute onset of nausea vomiting and diarrhea so could be infectious but multiple CT scans have shown colitis although colonoscopy in 2020 was negative for findings as well as random biopsies were negative for microscopic colitis  Patient should have repeat colonoscopy but currently she is not eager to have this done  Stool studies are ordered to rule out infectious etiology and I also ordered CRP and fecal calprotectin to further evaluate for inflammatory bowel disease and she states that if these are elevated and she would be more willing to have a colonoscopy  Can complete course of Augmentin as prescribed  Recommended probiotic as well  - Ambulatory Referral to Gastroenterology  - C-reactive protein; Future  - Stool Enteric Bacterial Panel by PCR; Future  - Clostridium difficile toxin by PCR; Future  - Calprotectin,Fecal; Future    2  Abnormal CT of the abdomen  As above  - Ambulatory Referral to Gastroenterology      3  Nausea and vomiting  Patient with nausea and vomiting and this has been intermittent and she attributes this to anxiety but would recommend upper endoscopy for further evaluation  Patient did have acute onset of nausea and vomiting and appears to be infectious at this time but does have some chronic symptoms as well  We will see patient in a month for a follow-up and go over results and make further recommendations at that time  ______________________________________________________________________    HPI:       33 Y/O female with c/o nausea, vomiting, starting suddenly on Tuesday - she states she ate a corn muffin and felt sick right away, left work and threw up on her way home    Pt reports pain is in the RUQ and epigastric area, feeling very bloated  Pt reports associated diarrhea on and off  "feels like gallstones" that she had before but had cholecystectomy 3 years ago  Patient had multiple CT scans showing thickening of her colon-CT scan yesterday showed mild wall thickening involving descending colon sigmoid colon and rectum consistent with colitis  Patient was prescribed Augmentin  CT scan done in 2021 showed mild wall thickening of the descending colon which was probably exaggerated by under distention    CT scan showed diffuse transfer cause colonic wall thickening raising suspicion for nonspecific infectious or inflammatory colitis in 2020  Patient then underwent colonoscopy which was normal in October of 2020 and random biopsies were done but it does not state in the biopsy report what specific area biopsies were taken from but were negative for any microscopic colitis  Patient reports weight has been stable and she does get abdominal pain  Stool studies were ordered the patient has not yet had them done  Is still having nausea and Zofran was prescribed  She does get intermittent nausea and vomiting symptoms  She was attributing this to patient anxiety  Occasional reflux symptoms  Never has had upper endoscopy  No family history of IBD  REVIEW OF SYSTEMS:    CONSTITUTIONAL: Denies any fever, chills, rigors, and weight loss  HEENT: No earache or tinnitus  Denies hearing loss or visual disturbances  CARDIOVASCULAR: No chest pain or palpitations  RESPIRATORY: Denies any cough, hemoptysis, shortness of breath or dyspnea on exertion  GASTROINTESTINAL: As noted in the History of Present Illness  GENITOURINARY: No problems with urination  Denies any hematuria or dysuria  NEUROLOGIC: No dizziness or vertigo, denies headaches  MUSCULOSKELETAL: Denies any muscle or joint pain  SKIN: Denies skin rashes or itching  ENDOCRINE: Denies excessive thirst  Denies intolerance to heat or cold    PSYCHOSOCIAL: Denies depression or anxiety  Denies any recent memory loss  Historical Information   Past Medical History:   Diagnosis Date   • Allergic     Seasonal   • Anxiety    • Clotting disorder ([address] )    • Irritable bowel syndrome 2020 June   • Obesity    • Pyelonephritis 05/16/2021     Past Surgical History:   Procedure Laterality Date   • CHOLECYSTECTOMY  9/19/2019   • CHOLECYSTECTOMY LAPAROSCOPIC N/A 09/19/2019    Procedure: CHOLECYSTECTOMY LAPAROSCOPIC;  Surgeon: Dayana Harris MD;  Location: [facility];  Service: General   • COLONOSCOPY  2020 October   • WISDOM TOOTH EXTRACTION       Social History   Social History     Substance and Sexual Activity   Alcohol Use Yes    Comment: Very rare     Social History     Substance and Sexual Activity   Drug Use Never     Social History     Tobacco Use   Smoking Status Never Smoker   Smokeless Tobacco Never Used     Family History   Problem Relation Age of Onset   • Hypertension Father    • Anxiety disorder Maternal Grandmother    • Diabetes Paternal Aunt    • Diabetes Paternal Uncle    • Psoriasis Paternal Uncle        Meds/Allergies       Current Outpatient Medications:   •  ALPRAZolam (XANAX) 0 25 mg tablet  •  amoxicillin-clavulanate (AUGMENTIN) 500-125 mg per tablet  •  cetirizine (ZyrTEC) 10 mg tablet  •  Multiple Vitamin (MULTIVITAMIN) tablet  •  ondansetron (ZOFRAN) 4 mg tablet  No current facility-administered medications for this visit  Allergies   Allergen Reactions   • Ciprofloxacin Vomiting   • Pollen Extract            Objective     Blood pressure 117/89, pulse 74, height 5' 5" (1 651 m), weight 129 kg (285 lb), not currently breastfeeding  Body mass index is 47 43 kg/m²          PHYSICAL EXAM:      General Appearance:   Alert, cooperative, no distress   HEENT:   Normocephalic, atraumatic, anicteric      Neck:  Supple, symmetrical, trachea midline   Lungs:   Clear to auscultation bilaterally; no rales, rhonchi or wheezing; respirations unlabored    Heart[de-identified]   Regular rate and rhythm; no murmur, rub, or gallop  Abdomen:   Soft, non-tender, non-distended; normal bowel sounds; no masses, no organomegaly    Genitalia:   Deferred    Rectal:   Deferred    Extremities:  No cyanosis, clubbing or edema    Pulses:  2+ and symmetric    Skin:  No jaundice, rashes, or lesions    Lymph nodes:  No palpable cervical lymphadenopathy        Lab Results:   No visits with results within 1 Day(s) from this visit  Latest known visit with results is:   Appointment on 10/13/2022   Component Date Value   • WBC 10/13/2022 7 33    • RBC 10/13/2022 4 96    • Hemoglobin 10/13/2022 12 4    • Hematocrit 10/13/2022 38 5    • MCV 10/13/2022 78 (A)   • MCH 10/13/2022 25 0 (A)   • MCHC 10/13/2022 32 2    • RDW 10/13/2022 14 9    • MPV 10/13/2022 11 8    • Platelets 67/96/1861 295    • nRBC 10/13/2022 0    • Neutrophils Relative 10/13/2022 75    • Immat GRANS % 10/13/2022 0    • Lymphocytes Relative 10/13/2022 19    • Monocytes Relative 10/13/2022 6    • Eosinophils Relative 10/13/2022 0    • Basophils Relative 10/13/2022 0    • Neutrophils Absolute 10/13/2022 5 53    • Immature Grans Absolute 10/13/2022 0 02    • Lymphocytes Absolute 10/13/2022 1 36    • Monocytes Absolute 10/13/2022 0 40    • Eosinophils Absolute 10/13/2022 0 00    • Basophils Absolute 10/13/2022 0 02    • Sodium 10/13/2022 137    • Potassium 10/13/2022 3 6    • Chloride 10/13/2022 103    • CO2 10/13/2022 25    • ANION GAP 10/13/2022 9    • BUN 10/13/2022 6    • Creatinine 10/13/2022 0 64    • Glucose, Fasting 10/13/2022 94    • Calcium 10/13/2022 9 5    • AST 10/13/2022 16    • ALT 10/13/2022 20    • Alkaline Phosphatase 10/13/2022 53    • Total Protein 10/13/2022 7 2    • Albumin 10/13/2022 4 2    • Total Bilirubin 10/13/2022 0 38    • eGFR 10/13/2022 120    • Lipase 10/13/2022 9 (A)         Radiology Results:   CT abdomen pelvis w contrast    Result Date: 10/13/2022  Narrative: CT ABDOMEN AND PELVIS WITH IV CONTRAST INDICATION:   R10 31:  Right lower quadrant pain R11 2: Nausea with vomiting, unspecified R19 7: Diarrhea, unspecified  COMPARISON:  CT scan 5/16/2021  TECHNIQUE:  CT examination of the abdomen and pelvis was performed  Axial, sagittal, and coronal 2D reformatted images were created from the source data and submitted for interpretation  Radiation dose length product (DLP) for this visit:  1390 mGy-cm   This examination, like all CT scans performed in the [facility], was performed utilizing techniques to minimize radiation dose exposure, including the use of iterative reconstruction and automated exposure control  IV Contrast:  100 mL of iohexol (OMNIPAQUE) Enteric Contrast:  Enteric contrast was administered  FINDINGS: ABDOMEN LOWER CHEST:  No clinically significant abnormality identified in the visualized lower chest  LIVER/BILIARY TREE:  Unremarkable  GALLBLADDER:  Removed  SPLEEN:  Unremarkable  PANCREAS:  Unremarkable  ADRENAL GLANDS:  Unremarkable  KIDNEYS/URETERS:  Unremarkable  No hydronephrosis  STOMACH AND BOWEL:  Mild wall thickening involving the descending colon, sigmoid colon and rectum consistent with colitis  No bowel obstruction  APPENDIX:  A normal appendix was visualized  ABDOMINOPELVIC CAVITY:  No ascites  No pneumoperitoneum  No lymphadenopathy  VESSELS:  Unremarkable for patient's age  PELVIS REPRODUCTIVE ORGANS:  Unremarkable for patient's age  URINARY BLADDER:  Unremarkable  ABDOMINAL WALL/INGUINAL REGIONS:  Unremarkable  OSSEOUS STRUCTURES:  No acute fracture or destructive osseous lesion  Impression: Mild wall thickening involving the descending colon, sigmoid colon and rectum consistent with colitis    Workstation performed: [id number]

## 2022-11-01 ENCOUNTER — APPOINTMENT (OUTPATIENT)
Dept: LAB | Age: 27
End: 2022-11-01

## 2022-11-01 DIAGNOSIS — R11.2 NAUSEA VOMITING AND DIARRHEA: ICD-10-CM

## 2022-11-01 DIAGNOSIS — K52.9 COLITIS: ICD-10-CM

## 2022-11-01 DIAGNOSIS — R19.7 NAUSEA VOMITING AND DIARRHEA: ICD-10-CM

## 2022-11-01 LAB — CRP SERPL QL: 4 MG/L

## 2022-11-01 RX ORDER — ONDANSETRON 4 MG/1
4 TABLET, FILM COATED ORAL EVERY 8 HOURS PRN
Qty: 30 TABLET | Refills: 0 | Status: SHIPPED | OUTPATIENT
Start: 2022-11-01

## 2022-11-02 ENCOUNTER — TELEPHONE (OUTPATIENT)
Dept: LAB | Facility: HOSPITAL | Age: 27
End: 2022-11-02

## 2022-11-02 LAB
CAMPYLOBACTER DNA SPEC NAA+PROBE: NORMAL
SALMONELLA DNA SPEC QL NAA+PROBE: NORMAL
SHIGA TOXIN STX GENE SPEC NAA+PROBE: NORMAL
SHIGELLA DNA SPEC QL NAA+PROBE: NORMAL
WBC STL QL MICRO: NORMAL

## 2022-11-08 ENCOUNTER — APPOINTMENT (OUTPATIENT)
Dept: LAB | Age: 27
End: 2022-11-08

## 2022-11-08 DIAGNOSIS — K52.9 COLITIS: ICD-10-CM

## 2022-11-10 ENCOUNTER — OFFICE VISIT (OUTPATIENT)
Dept: GASTROENTEROLOGY | Facility: CLINIC | Age: 27
End: 2022-11-10

## 2022-11-10 VITALS — BODY MASS INDEX: 47.48 KG/M2 | WEIGHT: 285 LBS | HEIGHT: 65 IN

## 2022-11-10 DIAGNOSIS — R93.5 ABNORMAL CT OF THE ABDOMEN: ICD-10-CM

## 2022-11-10 DIAGNOSIS — K52.9 COLITIS: ICD-10-CM

## 2022-11-10 DIAGNOSIS — R19.7 DIARRHEA, UNSPECIFIED TYPE: Primary | ICD-10-CM

## 2022-11-10 DIAGNOSIS — K21.9 GASTROESOPHAGEAL REFLUX DISEASE, UNSPECIFIED WHETHER ESOPHAGITIS PRESENT: ICD-10-CM

## 2022-11-10 DIAGNOSIS — R11.2 NAUSEA AND VOMITING, UNSPECIFIED VOMITING TYPE: ICD-10-CM

## 2022-11-10 NOTE — PROGRESS NOTES
[facility] - Outpatient Follow-up Note  Devon Nurse 32 y o  female MRN: [id number]  Encounter: [id number]          ASSESSMENT AND PLAN:      1  Diarrhea, unspecified type  Patient was having intermittent loose stools with CT scan findings of colitis and patient is feeling better on gluten free diet but has never had any evaluation for celiac disease and she is only been on gluten free for several weeks so I have asked her to get a celiac panel  - Celiac Disease Antibody Profile; Future    2  Colitis  Patient with mildly elevated CRP but fecal calprotectin is pending  Could have been infectious colitis, stool studies negative thus far  3  Abnormal CT of the abdomen  Consistent with colitis on multiple occasions of the left colon, did have colonoscopy with random biopsies in 2020 which was negative    4  Gastroesophageal reflux disease, unspecified whether esophagitis present  Patient with occasional reflux symptoms, not on any medication    5  Nausea and vomiting, unspecified vomiting type  Patient with history of nausea and vomiting which she attributed to either anxiety or p o  intake which has improved and we will continue dietary journal and feels better with avoidance of gluten    We will see her in 3 months for a follow-up and we will await stool studies in the interim  Advised to call if any problems or questions regarding the interim  ______________________________________________________________________    SUBJECTIVE:     33 Y/O female with c/o nausea, vomiting, was seen last month - she states she ate a corn muffin and felt sick right away, left work and threw up on her way home    Pt reports pain is in the RUQ and epigastric area, feeling very bloated  Pt reports associated diarrhea on and off  Patient is status post cholecystectomy    Patient had multiple CT scans showing thickening of her colon-CT scan yesterday showed mild wall thickening involving descending colon sigmoid colon and rectum consistent with colitis  Patient was prescribed Augmentin  CT scan done in 2021 showed mild wall thickening of the descending colon which was probably exaggerated by under distention    CT scan showed diffuse transfer cause colonic wall thickening raising suspicion for nonspecific infectious or inflammatory colitis in 2020  Patient then underwent colonoscopy which was normal in October of 2020 and random biopsies were done but it does not state in the biopsy report what specific area biopsies were taken from but were negative for any microscopic colitis  She does get intermittent nausea and vomiting symptoms  She was attributing this to patient anxiety  Occasional reflux symptoms  Never has had upper endoscopy  No family history of IBD  Stool studies were done which were negative for stool enteric bacterial panel and patient had negative stool for WBCs  She otherwise had C-reactive protein done which was mildly elevated at 4  Stool for C diff was not done and stool was formed and fecal calprotectin was just sent and is pending as well as ova and parasite  Patient still has some left lower quadrant pain but this has improved  She completed antibiotic course  She cut out gluten about 3 weeks ago and she has been doing better on gluten free diet  She also has food journaling to see if there are any dietary triggers  No episodes of nausea and vomiting since that time  Stool has actually slowed down and occasionally has has constipation which she attributed to the Zofran  REVIEW OF SYSTEMS IS OTHERWISE NEGATIVE        Historical Information   Past Medical History:   Diagnosis Date   • Allergic     Seasonal   • Anxiety    • Clotting disorder ([address] )    • Irritable bowel syndrome 2020 June   • Obesity    • Pyelonephritis 05/16/2021     Past Surgical History:   Procedure Laterality Date   • CHOLECYSTECTOMY  9/19/2019   • CHOLECYSTECTOMY LAPAROSCOPIC N/A 09/19/2019 Procedure: CHOLECYSTECTOMY LAPAROSCOPIC;  Surgeon: Luna Kang MD;  Location[facility];  Service: General   • COLONOSCOPY  2020 October   • WISDOM TOOTH EXTRACTION       Social History   Social History     Substance and Sexual Activity   Alcohol Use Yes    Comment: Very rare     Social History     Substance and Sexual Activity   Drug Use Never     Social History     Tobacco Use   Smoking Status Never Smoker   Smokeless Tobacco Never Used     Family History   Problem Relation Age of Onset   • Hypertension Father    • Anxiety disorder Maternal Grandmother    • Diabetes Paternal Aunt    • Diabetes Paternal Uncle    • Psoriasis Paternal Uncle        Meds/Allergies       Current Outpatient Medications:   •  ALPRAZolam (XANAX) 0 25 mg tablet  •  cetirizine (ZyrTEC) 10 mg tablet  •  Multiple Vitamin (MULTIVITAMIN) tablet  •  ondansetron (ZOFRAN) 4 mg tablet  •  Probiotic Product (PROBIOTIC PO)    Allergies   Allergen Reactions   • Ciprofloxacin Vomiting   • Pollen Extract            Objective     Height 5' 5" (1 651 m), weight 129 kg (285 lb), not currently breastfeeding  Body mass index is 47 43 kg/m²  PHYSICAL EXAM:      General Appearance:   Alert, cooperative, no distress   HEENT:   Normocephalic, atraumatic, anicteric      Neck:  Supple, symmetrical, trachea midline   Lungs:   Clear to auscultation bilaterally; no rales, rhonchi or wheezing; respirations unlabored    Heart[de-identified]   Regular rate and rhythm; no murmur, rub, or gallop  Abdomen:   Soft, non-tender, non-distended; normal bowel sounds; no masses, no organomegaly    Genitalia:   Deferred    Rectal:   Deferred    Extremities:  No cyanosis, clubbing or edema    Pulses:  2+ and symmetric    Skin:  No jaundice, rashes, or lesions    Lymph nodes:  No palpable cervical lymphadenopathy        Lab Results:   No visits with results within 1 Day(s) from this visit     Latest known visit with results is:   Appointment on 11/01/2022   Component Date Value   • Fecal Leukocytes 11/01/2022 No WBC's    • CRP 11/01/2022 4 0 (A)   • Salmonella sp PCR 11/01/2022 None Detected    • Shigella sp/Enteroinvasi* 11/01/2022 None Detected    • Campylobacter sp (jejuni* 11/01/2022 None Detected    • Shiga toxin 1/Shiga toxi* 11/01/2022 None Detected          Radiology Results:   CT abdomen pelvis w contrast    Result Date: 10/13/2022  Narrative: CT ABDOMEN AND PELVIS WITH IV CONTRAST INDICATION:   R10 31: Right lower quadrant pain R11 2: Nausea with vomiting, unspecified R19 7: Diarrhea, unspecified  COMPARISON:  CT scan 5/16/2021  TECHNIQUE:  CT examination of the abdomen and pelvis was performed  Axial, sagittal, and coronal 2D reformatted images were created from the source data and submitted for interpretation  Radiation dose length product (DLP) for this visit:  1390 mGy-cm   This examination, like all CT scans performed in the [facility], was performed utilizing techniques to minimize radiation dose exposure, including the use of iterative reconstruction and automated exposure control  IV Contrast:  100 mL of iohexol (OMNIPAQUE) Enteric Contrast:  Enteric contrast was administered  FINDINGS: ABDOMEN LOWER CHEST:  No clinically significant abnormality identified in the visualized lower chest  LIVER/BILIARY TREE:  Unremarkable  GALLBLADDER:  Removed  SPLEEN:  Unremarkable  PANCREAS:  Unremarkable  ADRENAL GLANDS:  Unremarkable  KIDNEYS/URETERS:  Unremarkable  No hydronephrosis  STOMACH AND BOWEL:  Mild wall thickening involving the descending colon, sigmoid colon and rectum consistent with colitis  No bowel obstruction  APPENDIX:  A normal appendix was visualized  ABDOMINOPELVIC CAVITY:  No ascites  No pneumoperitoneum  No lymphadenopathy  VESSELS:  Unremarkable for patient's age  PELVIS REPRODUCTIVE ORGANS:  Unremarkable for patient's age  URINARY BLADDER:  Unremarkable  ABDOMINAL WALL/INGUINAL REGIONS:  Unremarkable   OSSEOUS STRUCTURES:  No acute fracture or destructive osseous lesion  Impression: Mild wall thickening involving the descending colon, sigmoid colon and rectum consistent with colitis    Workstation performed: [id number]

## 2022-11-11 LAB — CALPROTECTIN STL-MCNT: <16 UG/G (ref 0–120)

## 2022-12-05 ENCOUNTER — TELEPHONE (OUTPATIENT)
Dept: INTERNAL MEDICINE CLINIC | Facility: OTHER | Age: 27
End: 2022-12-05

## 2022-12-08 ENCOUNTER — AMB VIDEO VISIT (OUTPATIENT)
Dept: OTHER | Facility: HOSPITAL | Age: 27
End: 2022-12-08

## 2022-12-08 VITALS — TEMPERATURE: 98.4 F | RESPIRATION RATE: 16 BRPM

## 2022-12-08 DIAGNOSIS — J01.90 ACUTE SINUSITIS, RECURRENCE NOT SPECIFIED, UNSPECIFIED LOCATION: Primary | ICD-10-CM

## 2022-12-08 RX ORDER — AMOXICILLIN 875 MG/1
875 TABLET, COATED ORAL 2 TIMES DAILY
Qty: 20 TABLET | Refills: 0 | Status: SHIPPED | OUTPATIENT
Start: 2022-12-08 | End: 2022-12-18

## 2022-12-08 NOTE — PATIENT INSTRUCTIONS
Rest and drink extra fluids  Start antibiotic  Take probiotic  OTC cough and cold medication  Tylenol or motrin  Mucinex may also be helpful  Follow up with PCP if no improvement  GO to ER with any worsening symptoms

## 2022-12-08 NOTE — CARE ANYWHERE EVISITS
Visit Summary for JEAN CLAUDE POOL - Gender: Female - Date of Birth: [id number]  Date: [id number] - Duration: 4 minutes  Patient: Leora Avalos   [facility]  Provider: Octavio MAYES    Patient Contact Information  Address  [address] [place] [address]; [address]  [phone]    Visit Topics  Cold [Added By: Self - 2022-12-08]    Triage Questions   What is your current physical address in the event of a medical emergency? Answer []  Are you allergic to any medications? Answer []  Are you now or could you be pregnant? Answer []  Do you have any immune system compromise or chronic lung   disease? Answer []  Do you have any vulnerable family members in the home (infant, pregnant, cancer, elderly)? Answer []     Conversation Transcripts  [0A][0A] [Notification] You are connected with Parrish Pizarro, Urgent Care Specialist [0A][Notification] Mark Schrader is located in [place]  [0A][Notification] Mark Schrader has shared health history  Becky Engel  [0A]    Diagnosis  Acute pansinusitis    Procedures  Value: [id number] Code: CPT-4 UNLISTED E&M SERVICE    Medications Prescribed    No prescriptions ordered    Electronically signed by: Parrish Pugh(NPI [id number])

## 2023-02-06 NOTE — PROGRESS NOTES
Video Visit - Pelon Bocanegra 32 y o  female MRN: [id number]    REQUIRED DOCUMENTATION:         1  This [facility] was [facility] via [facility]  2  Provider located at [address] [place] [id number] [phone]  3  [facility] provider: Kassandra Kocher, CRNP  4  Identify all parties in room with patient during [facility] visit:  Patient   5  After connecting through [employer], patient was identified by name and date of birth  Patient was then informed that this was a Telemedicine visit and that the exam was being conducted confidentially over secure lines  My office door was closed  No one else was in the room  Patient acknowledged consent and understanding of privacy and security of the Telemedicine visit  I informed the patient that I have reviewed their record in Epic and presented the opportunity for them to ask any questions regarding the visit today  The patient agreed to participate  This is a 32year old female here today for video visit  She has nasal congestion and sinus pressure  Symptoms started about 6 days ago  She states it started as sore throat  She is having clear to green nasal discharge  No fever, slight body aches or chills  She has been using nyquil  She is vaccinated for coivd but not flu  She did test for covid and was negative  She is eating and drinking okay  Decreased appetite  Slight cough  Review of Systems   Constitutional: Positive for fatigue  Negative for activity change, chills and fever  HENT: Positive for congestion, rhinorrhea, sinus pressure and sinus pain  Respiratory: Positive for cough  Negative for shortness of breath  Cardiovascular: Negative  Musculoskeletal: Negative  Skin: Negative  Neurological: Negative  Psychiatric/Behavioral: Negative  Vitals:    12/08/22 1009   Resp: 16   Temp: 98 4 °F (36 9 °C)     Physical Exam  Constitutional:       General: She is not in acute distress       Appearance: Normal appearance  She is not ill-appearing or toxic-appearing  HENT:      Head: Normocephalic and atraumatic  Eyes:      Conjunctiva/sclera: Conjunctivae normal    Pulmonary:      Effort: Pulmonary effort is normal  No respiratory distress  Comments: No cough or audible wheeze   Skin:     Comments: No rash on head or neck  Neurological:      Mental Status: She is alert and oriented to person, place, and time  Psychiatric:         Mood and Affect: Mood normal          Behavior: Behavior normal          Thought Content: Thought content normal          Judgment: Judgment normal        Diagnoses and all orders for this visit:    Acute sinusitis, recurrence not specified, unspecified location  -     amoxicillin (AMOXIL) 875 mg tablet; Take 1 tablet (875 mg total) by mouth 2 (two) times a day for 10 days      Patient Instructions   Rest and drink extra fluids  Start antibiotic  Take probiotic  OTC cough and cold medication  Tylenol or motrin  Mucinex may also be helpful  Follow up with PCP if no improvement  GO to ER with any worsening symptoms  Follow up with PCP if not improved, if symptoms are worse, go to the ER  Detail Level: Zone Detail Level: Detailed Detail Level: Generalized Detail Level: Simple

## 2023-06-25 NOTE — TELEPHONE ENCOUNTER
Dixie Angulo RN [facility]  Pt has questions about continuing Prednisone  Pt placed on phone and reports if Prednisone 50 mg to continue will need a refill  D/W Dr Daja Olsen  Prednisone 50 mg daily will continue and will send new script  Pt also asked about next plan  Informed Dr Daja Olsen considering Rituxan infusions and that's why additional labs were drawn today  Pt will have [facility] on Monday and will be update after receive results  Statement Selected

## 2023-10-29 ENCOUNTER — AMB VIDEO VISIT (OUTPATIENT)
Dept: OTHER | Facility: HOSPITAL | Age: 28
End: 2023-10-29

## 2023-10-29 VITALS — TEMPERATURE: 98.2 F

## 2023-10-29 DIAGNOSIS — N39.0 URINARY TRACT INFECTION WITHOUT HEMATURIA, SITE UNSPECIFIED: Primary | ICD-10-CM

## 2023-10-29 PROCEDURE — ECARE PR SL URGENT CARE VIRTUAL VISIT: Performed by: NURSE PRACTITIONER

## 2023-10-29 RX ORDER — CEPHALEXIN 500 MG/1
500 CAPSULE ORAL EVERY 12 HOURS SCHEDULED
Qty: 10 CAPSULE | Refills: 0 | Status: SHIPPED | OUTPATIENT
Start: 2023-10-29 | End: 2023-11-03

## 2023-10-29 RX ORDER — CHOLESTYRAMINE 4 G/9G
POWDER, FOR SUSPENSION ORAL
COMMUNITY
Start: 2023-02-01

## 2023-10-29 NOTE — PROGRESS NOTES
Video Visit - Gianna Oh 25 y.o. female MRN: [id number]    REQUIRED DOCUMENTATION:         1. This service was provided via [employer]. 2. Provider located at [address] [place] [address]  [phone].  3. [facility] provider: Mya Paniagua, [address] [facility]. 4. Identify all parties in room with [facility]:  Patient   5. After connecting through [employer], patient was identified by name and date of birth. Patient was then informed that this was a Telemedicine visit and that the exam was being conducted confidentially over secure lines. My office door was closed. No one else was in the room. Patient acknowledged consent and understanding of privacy and security of the Telemedicine visit. I informed the patient that I have reviewed their record in Epic and presented the opportunity for them to ask any questions regarding the visit today. The patient agreed to participate. This is a 25year old female here today for video visit. She has been having a dull pain in the middle of back. She states she did not fever. She went to the store and got a urinary urine dip. She states leukocytes were positive. She denies any urgency frequency or burning. She denies any back injury. She states the pain is not reproducible with movement. Review of Systems   Constitutional: Negative. Respiratory: Negative. Genitourinary:  Negative for dysuria, frequency and urgency. Musculoskeletal:  Positive for back pain. Neurological: Negative. Psychiatric/Behavioral: Negative. Vitals:    10/29/23 1803   Temp: 98.2 °F (36.8 °C)     Physical Exam  Constitutional:       General: She is not in acute distress. Appearance: Normal appearance. She is not ill-appearing or toxic-appearing. Eyes:      Conjunctiva/sclera: Conjunctivae normal.   Pulmonary:      Effort: Pulmonary effort is normal. No respiratory distress. Abdominal:      Tenderness:  There is no right CVA tenderness or left CVA tenderness. Comments: On self palpation. Neurological:      Mental Status: She is alert and oriented to person, place, and time. Psychiatric:         Mood and Affect: Mood normal.         Behavior: Behavior normal.         Thought Content: Thought content normal.         Judgment: Judgment normal.       Diagnoses and all orders for this visit:    Urinary tract infection without hematuria, site unspecified  -     Urine culture; Future  -     cephalexin (KEFLEX) 500 mg capsule; Take 1 capsule (500 mg total) by mouth every 12 (twelve) hours for 5 days      Patient Instructions   Urine culture ordered. Start antibiotic. Take probiotic. Increase oral fluids. Tylenol or Motrin for pain or fever. Azo for bladder spasms. Follow up with PCP if no improvement. Go to ER with abd pain, flank pain or worsening symptoms, fever      Urinary Tract Infection in Women   WHAT YOU NEED TO KNOW:   A urinary tract infection (UTI) is caused by bacteria that get inside your urinary tract. Most bacteria that enter your urinary tract come out when you urinate. If the bacteria stay in your urinary tract, you may get an infection. Your urinary tract includes your kidneys, ureters, bladder, and urethra. Urine is made in your kidneys, and it flows from the ureters to the bladder. Urine leaves the bladder through the urethra. A UTI is more common in your lower urinary tract, which includes your bladder and urethra. DISCHARGE INSTRUCTIONS:   Return to the emergency department if:   You are urinating very little or not at all. You have a high fever with shaking chills. You have side or back pain that gets worse. Contact your healthcare provider if:   You have a fever. You do not feel better after 2 days of taking antibiotics. You are vomiting. You have questions or concerns about your condition or care. Medicines:   Antibiotics  help fight a bacterial infection. Medicines  may be given to decrease pain and burning when you urinate. They will also help decrease the feeling that you need to urinate often. These medicines will make your urine orange or red. Take your medicine as directed. Contact your healthcare provider if you think your medicine is not helping or if you have side effects. Tell him or her if you are allergic to any medicine. Keep a list of the medicines, vitamins, and herbs you take. Include the amounts, and when and why you take them. Bring the list or the pill bottles to follow-up visits. Carry your medicine list with you in case of an emergency. Follow up with your healthcare provider as directed:  Write down your questions so you remember to ask them during your visits. Prevent another UTI:   Empty your bladder often. Urinate and empty your bladder as soon as you feel the need. Do not hold your urine for long periods of time. Wipe from front to back after you urinate or have a bowel movement. This will help prevent germs from getting into your urinary tract through your urethra. Drink liquids as directed. Ask how much liquid to drink each day and which liquids are best for you. You may need to drink more liquids than usual to help flush out the bacteria. Do not drink alcohol, caffeine, or citrus juices. These can irritate your bladder and increase your symptoms. Your healthcare provider may recommend cranberry juice to help prevent a UTI. Urinate after you have sex. This can help flush out bacteria passed during sex. Do not douche or use feminine deodorants. These can change the chemical balance in your vagina. Change sanitary pads or tampons often. This will help prevent germs from getting into your urinary tract. Do pelvic muscle exercises often. Pelvic muscle exercises may help you start and stop urinating. Strong pelvic muscles may help you empty your bladder easier.  Squeeze these muscles tightly for 5 seconds like you are trying to hold back urine. Then relax for 5 seconds. Gradually work up to squeezing for 10 seconds. Do 3 sets of 15 repetitions a day, or as directed. © 2017 [facility] [employer] Information is for End User's use only and may not be sold, redistributed or otherwise used for commercial purposes. All illustrations and images included in CareNotes® are the copyrighted property of [employer]. or Jeffrey Mayo. The above information is an  only. It is not intended as medical advice for individual conditions or treatments. Talk to your doctor, nurse or pharmacist before following any medical regimen to see if it is safe and effective for you. Follow up with PCP if not improved, if symptoms are worse, go to the ER.

## 2023-10-30 NOTE — CARE ANYWHERE EVISITS
Visit Summary for JEAN CLAUDE POOL - Gender: Female - Date of Birth: [id number]  Date: [id number] - Duration: 5 minutes  Patient: Jerrica Eubanks . [facility]  Provider: Anabelle MAYES    Patient Contact Information  Address  [place]  [phone]    Visit Topics  possible uti. no fever. back pain. uti test kit positive for leukocytes. [Added ByToi Meronr - [phone]]    Triage Questions   What is your current physical address in the event of a medical emergency? Answer []  Are you allergic to any medications? Answer []  Are you now or could you be pregnant? Answer []  Do you have any immune system compromise or chronic lung   disease? Answer []  Do you have any vulnerable family members in the home (infant, pregnant, cancer, elderly)? Answer []     Conversation Transcripts  [0A][0A] [Notification] You are connected with Parrish Girard, Urgent Care Specialist.[0A][Notification] Zacarias Hedrick is located in [place]. [0A][Notification] Zacarias Hedrick has shared health history. [place] .[0A]    Diagnosis  Urinary tract infection, site not specified    Procedures  Value: 39911 Code: CPT-4 UNLISTED E&M SERVICE    Medications Prescribed    No prescriptions ordered    Electronically signed by: Parrish Vee(NPI [id number])

## 2023-10-30 NOTE — PATIENT INSTRUCTIONS
Urine culture ordered. Start antibiotic. Take probiotic. Increase oral fluids. Tylenol or Motrin for pain or fever. Azo for bladder spasms. Follow up with PCP if no improvement. Go to ER with abd pain, flank pain or worsening symptoms, fever      Urinary Tract Infection in Women   WHAT YOU NEED TO KNOW:   A urinary tract infection (UTI) is caused by bacteria that get inside your urinary tract. Most bacteria that enter your urinary tract come out when you urinate. If the bacteria stay in your urinary tract, you may get an infection. Your urinary tract includes your kidneys, ureters, bladder, and urethra. Urine is made in your kidneys, and it flows from the ureters to the bladder. Urine leaves the bladder through the urethra. A UTI is more common in your lower urinary tract, which includes your bladder and urethra. DISCHARGE INSTRUCTIONS:   Return to the emergency department if:   You are urinating very little or not at all. You have a high fever with shaking chills. You have side or back pain that gets worse. Contact your healthcare provider if:   You have a fever. You do not feel better after 2 days of taking antibiotics. You are vomiting. You have questions or concerns about your condition or care. Medicines:   Antibiotics  help fight a bacterial infection. Medicines  may be given to decrease pain and burning when you urinate. They will also help decrease the feeling that you need to urinate often. These medicines will make your urine orange or red. Take your medicine as directed. Contact your healthcare provider if you think your medicine is not helping or if you have side effects. Tell him or her if you are allergic to any medicine. Keep a list of the medicines, vitamins, and herbs you take. Include the amounts, and when and why you take them. Bring the list or the pill bottles to follow-up visits. Carry your medicine list with you in case of an emergency.   Follow up with your healthcare provider as directed:  Write down your questions so you remember to ask them during your visits. Prevent another UTI:   Empty your bladder often. Urinate and empty your bladder as soon as you feel the need. Do not hold your urine for long periods of time. Wipe from front to back after you urinate or have a bowel movement. This will help prevent germs from getting into your urinary tract through your urethra. Drink liquids as directed. Ask how much liquid to drink each day and which liquids are best for you. You may need to drink more liquids than usual to help flush out the bacteria. Do not drink alcohol, caffeine, or citrus juices. These can irritate your bladder and increase your symptoms. Your healthcare provider may recommend cranberry juice to help prevent a UTI. Urinate after you have sex. This can help flush out bacteria passed during sex. Do not douche or use feminine deodorants. These can change the chemical balance in your vagina. Change sanitary pads or tampons often. This will help prevent germs from getting into your urinary tract. Do pelvic muscle exercises often. Pelvic muscle exercises may help you start and stop urinating. Strong pelvic muscles may help you empty your bladder easier. Squeeze these muscles tightly for 5 seconds like you are trying to hold back urine. Then relax for 5 seconds. Gradually work up to squeezing for 10 seconds. Do 3 sets of 15 repetitions a day, or as directed. © 2017 [address] [place] [address] [employer] Information is for End User's use only and may not be sold, redistributed or otherwise used for commercial purposes. All illustrations and images included in CareNotes® are the copyrighted property of [employer] or Jeffrey Mayo. The above information is an  only. It is not intended as medical advice for individual conditions or treatments.  Talk to your doctor, nurse or pharmacist before following any medical regimen to see if it is safe and effective for you.

## 2023-11-06 ENCOUNTER — TELEPHONE (OUTPATIENT)
Dept: OTHER | Facility: HOSPITAL | Age: 28
End: 2023-11-06

## 2023-11-06 NOTE — TELEPHONE ENCOUNTER
Tried to call and discuss urine culture done by [facility] which was negative. She should stop antibiotic and follow up with PCP for any further back pain.

## 2024-02-07 ENCOUNTER — HOSPITAL ENCOUNTER (OUTPATIENT)
Dept: RADIOLOGY | Facility: IMAGING CENTER | Age: 29
Discharge: HOME | End: 2024-02-07
Payer: COMMERCIAL

## 2024-02-07 DIAGNOSIS — R10.11 ABDOMINAL PAIN, RIGHT UPPER QUADRANT: ICD-10-CM

## 2024-02-07 PROCEDURE — 76705 ECHO EXAM OF ABDOMEN: CPT

## 2024-02-21 PROBLEM — Z01.419 WOMEN'S ANNUAL ROUTINE GYNECOLOGICAL EXAMINATION: Status: RESOLVED | Noted: 2020-01-14 | Resolved: 2024-02-21

## 2024-08-01 RX ORDER — ESCITALOPRAM OXALATE 5 MG/1
5 TABLET ORAL DAILY
COMMUNITY
Start: 2024-03-09 | End: 2024-08-02 | Stop reason: SDUPTHER

## 2024-08-02 ENCOUNTER — OFFICE VISIT (OUTPATIENT)
Dept: INTERNAL MEDICINE CLINIC | Age: 29
End: 2024-08-02
Payer: COMMERCIAL

## 2024-08-02 VITALS
BODY MASS INDEX: 45.82 KG/M2 | DIASTOLIC BLOOD PRESSURE: 64 MMHG | WEIGHT: 275 LBS | SYSTOLIC BLOOD PRESSURE: 122 MMHG | OXYGEN SATURATION: 100 % | HEIGHT: 65 IN | TEMPERATURE: 97.8 F | HEART RATE: 77 BPM

## 2024-08-02 DIAGNOSIS — Z00.00 ANNUAL PHYSICAL EXAM: Primary | ICD-10-CM

## 2024-08-02 DIAGNOSIS — F41.8 OTHER SPECIFIED ANXIETY DISORDERS: ICD-10-CM

## 2024-08-02 PROCEDURE — 99214 OFFICE O/P EST MOD 30 MIN: CPT | Performed by: INTERNAL MEDICINE

## 2024-08-02 PROCEDURE — 99395 PREV VISIT EST AGE 18-39: CPT | Performed by: INTERNAL MEDICINE

## 2024-08-02 RX ORDER — ESCITALOPRAM OXALATE 5 MG/1
5 TABLET ORAL DAILY
Qty: 90 TABLET | Refills: 1 | Status: SHIPPED | OUTPATIENT
Start: 2024-08-02

## 2024-08-02 RX ORDER — ALPRAZOLAM 0.25 MG/1
0.25 TABLET ORAL DAILY PRN
Qty: 10 TABLET | Refills: 0 | Status: SHIPPED | OUTPATIENT
Start: 2024-08-02

## 2024-08-02 NOTE — PROGRESS NOTES
Assessment/Plan:    Annual physical examination  - History and physical examination done  - Pt was counseled to eat a heart healthy diet, to drink at least 2 L of water daily, to take a daily multivitamin and to exercise for at least 30 minutes of cardio exercise daily, for at least 5 days a week.  - CBC, CMP, TSH and lipid panel have been ordered and we will follow-up with the results.  -She is up-to-date with her COVID-vaccine x 3  -She is up-to-date with her Pap smear  - follow up in 6 months       Diagnoses and all orders for this visit:    Annual physical exam  -     CBC and differential; Future  -     TSH, 3rd generation with Free T4 reflex; Future  -     Comprehensive metabolic panel; Future  -     Lipid panel; Future    Other specified anxiety disorders  -     ALPRAZolam (XANAX) 0.25 mg tablet; Take 1 tablet (0.25 mg total) by mouth daily as needed for anxiety  -     Ambulatory referral to Psych Services; Future  -     escitalopram (LEXAPRO) 5 mg tablet; Take 1 tablet (5 mg total) by mouth daily Take 1/2 pill daily for 2.5 mg daily    BMI 45.0-49.9, adult (HCC)    Other orders  -     Discontinue: escitalopram (LEXAPRO) 5 mg tablet; Take 5 mg by mouth daily Take 1/2 pill daily for 2.5 mg daily          Subjective:      Patient ID: Eda Santacruz is a 29 y.o. female.    HPI    Patient presents for an annual physical exam.    Last annual physical exam-over 1 year ago    Past medical history-GERD, gastritis, idiopathic thrombocytopenic purpura, thrombocytopenia, hemolytic anemia, seasonal allergies, gurwinder, ovarian cyst rupture    Past surgical history-cholecystectomy, wisdom tooth extraction,    Medications-see list,    Allergies-see list    Diet- mixture of balanced and junk food, drinks about 60 oz a day of water    Exercise- active at work    Alcohol use- none    Caffeine and soda use- tea - one cup a day    Nicotine use-never    Recreational drug use-never    Work-  for [employer]    Sexual  history, STD history and HIV testing- never been sexually active, HIV testing- done and neg    Gynecological history/Prostate health/testicular health history-last menstrual period- 7/5/24  Last Pap smear- 2 years ago - 4/11/22    Colonoscopy-10/21/2020-normal.  Recommendation from repeat colonoscopy at age 43.    Immunization [id number]-io-rn-date with 3 COVID vaccines    Dental visit- every 6 months    Vision- glassed - myopia     Family history-  Hypertension-dad  Diabetes mellitus-better now on send ankles and grandparents  CVA- dad's relatives    Today, patient she states that she had lexapro prescribed 5 months ago for gurwinder and it has been helping and she would like to see a therapist.  She only takes 2.5 mg daily.  She states that she recently got a lot of refill from her online provider but would like to have it refilled by the office next time she needs it.  She ultimately hopes to get off the medication and continue to see a therapist.  She states that her social anxiety is well-controlled by her as needed alprazolam and would like a refill of alprazolam.  She states that she has had a lot of stomach issues and sees a nutritionist and is now gluten free and it helps her symptoms.  She is thinking about weight loss and wants to discuss about the possible medications.  She admits to occasional sinus pressure and menorrhagia but denies any  fever, chills, night sweats, headache, dizziness, nasal congestion, runny nose, pnd, sore throat, ear ache,  wheezing, cough, chest pain, sob, palpitations, nausea, vomiting, diarrhea, constipation, hematochezia, hematuria, melena stools, arthralgias, myalgias, feelings of depression or insomnia.        The following portions of the patient's history were reviewed and updated as appropriate: She  has a past medical history of Allergic, Anxiety, Clotting disorder (HCC), Irritable bowel syndrome (2020), Obesity, and Pyelonephritis (05/16/2021).  She   Patient Active Problem List     Diagnosis Date Noted   • Acute midline thoracic back pain 06/03/2021   • Acute gastritis without hemorrhage 05/20/2021   • Gastroesophageal reflux disease 10/21/2020   • Left upper quadrant abdominal pain 06/11/2020   • Epigastric pain 06/11/2020   • Vaginal discharge 01/15/2020   • BMI 45.0-49.9, adult ([facility]) 11/01/2019   • Hemolytic anemia ([facility]) 10/16/2019   • Need for influenza vaccination 10/12/2019   • Cholecystitis 09/18/2019   • Idiopathic thrombocytopenic purpura (ITP) ([facility]) 09/11/2019   • Hypokalemia 09/11/2019   • Petechiae 09/03/2019   • Thrombocytopenia ([facility]) 09/03/2019   • Seasonal allergies 07/01/2019   • Other specified anxiety disorders 07/01/2019   • Annual physical exam 07/01/2019   • Morbid obesity ([facility]) 07/01/2019     She  has a past surgical history that includes [place] tooth extraction; CHOLECYSTECTOMY LAPAROSCOPIC (N/A, 09/19/2019); Cholecystectomy (9/19/2019); and Colonoscopy (2020).  Her family history includes Anxiety disorder in her maternal grandmother; Diabetes in her paternal aunt and paternal uncle; Hypertension in her father; Psoriasis in her paternal uncle.  She  reports that she has never smoked. She has never used smokeless tobacco. She reports current alcohol use. She reports that she does not use drugs.  Current Outpatient Medications   Medication Sig Dispense Refill   • ALPRAZolam (XANAX) 0.25 mg tablet Take 1 tablet (0.25 mg total) by mouth daily as needed for anxiety 10 tablet 0   • cholestyramine (QUESTRAN) 4 g packet      • escitalopram (LEXAPRO) 5 mg tablet Take 1 tablet (5 mg total) by mouth daily Take 1/2 pill daily for 2.5 mg daily 90 tablet 1   • ondansetron (ZOFRAN) 4 mg tablet Take 1 tablet (4 mg total) by mouth every 8 (eight) hours as needed for nausea or vomiting 30 tablet 0     No current facility-administered medications for this visit.     Current Outpatient Medications on File Prior to Visit   Medication Sig   • cholestyramine (QUESTRAN) 4 g packet    •  "ondansetron (ZOFRAN) 4 mg tablet Take 1 tablet (4 mg total) by mouth every 8 (eight) hours as needed for nausea or vomiting   • [DISCONTINUED] ALPRAZolam (XANAX) 0.25 mg tablet Take 1 tablet (0.25 mg total) by mouth daily as needed for anxiety   • [DISCONTINUED] escitalopram (LEXAPRO) 5 mg tablet Take 5 mg by mouth daily Take 1/2 pill daily for 2.5 mg daily     No current facility-administered medications on file prior to visit.     She is allergic to ciprofloxacin and pollen extract..    Review of Systems   Constitutional:  Negative for activity change, chills, fatigue, fever and unexpected weight change.   HENT:  Positive for sinus pain (on the left maxillary -  has allergies and has flonase). Negative for ear pain, postnasal drip, rhinorrhea, sinus pressure and sore throat.    Eyes:  Negative for pain.   Respiratory:  Negative for cough, choking, chest tightness, shortness of breath and wheezing.    Cardiovascular:  Negative for chest pain, palpitations and leg swelling.   Gastrointestinal:  Negative for abdominal pain, constipation, diarrhea, nausea and vomiting.   Genitourinary:  Positive for menstrual problem (heavy menstrual bleeds). Negative for dysuria and hematuria.   Musculoskeletal:  Negative for arthralgias, back pain, gait problem, joint swelling, myalgias and neck stiffness.   Skin:  Negative for pallor and rash.   Neurological:  Negative for dizziness, tremors, seizures, syncope, light-headedness and headaches.   Hematological:  Negative for adenopathy.   Psychiatric/Behavioral:  Negative for behavioral problems, dysphoric mood and sleep disturbance. The patient is nervous/anxious.          Objective:      /64 (BP Location: Left arm, Patient Position: Sitting, Cuff Size: Large)   Pulse 77   Temp 97.8 °F (36.6 °C) (Temporal)   Ht 5' 4.5\" (1.638 m)   Wt 125 kg (275 lb)   SpO2 100%   BMI 46.47 kg/m²          Physical Exam  Constitutional:       General: She is not in acute distress.     " Appearance: She is well-developed. She is not diaphoretic.   HENT:      Head: Normocephalic and atraumatic.      Right Ear: External ear normal.      Left Ear: External ear normal.      Nose: Nose normal.      Mouth/Throat:      Mouth: Mucous membranes are dry.      Pharynx: Posterior oropharyngeal erythema present. No oropharyngeal exudate.   Eyes:      General: No scleral icterus.        Right eye: No discharge.         Left eye: No discharge.      Conjunctiva/sclera: Conjunctivae normal.      Pupils: Pupils are equal, round, and reactive to light.   Neck:      Thyroid: No thyromegaly.      Vascular: No JVD.      Trachea: No tracheal deviation.   Cardiovascular:      Rate and Rhythm: Normal rate and regular rhythm.      Heart sounds: Normal heart sounds. No murmur heard.     No friction rub. No gallop.   Pulmonary:      Effort: Pulmonary effort is normal. No respiratory distress.      Breath sounds: Normal breath sounds. No wheezing or rales.   Chest:      Chest wall: No tenderness.   Abdominal:      General: Bowel sounds are normal. There is no distension.      Palpations: Abdomen is soft. There is no mass.      Tenderness: There is no abdominal tenderness. There is no guarding or rebound.   Musculoskeletal:         General: No tenderness or deformity. Normal range of motion.      Cervical back: Normal range of motion and neck supple.   Lymphadenopathy:      Cervical: No cervical adenopathy.   Skin:     General: Skin is warm and dry.      Coloration: Skin is not pale.      Findings: No erythema or rash.   Neurological:      Mental Status: She is alert and oriented to person, place, and time.      Cranial Nerves: No cranial nerve deficit.      Motor: No abnormal muscle tone.      Coordination: Coordination normal.      Deep Tendon Reflexes: Reflexes are normal and symmetric.      Comments: Cranial nerves 2-12 are intact bilaterally  Muscle strength is 5/5 in all extremities  Sensation is intact in bilateral face  and extremities  Rapid alternating movement and finger-to-nose pointing test intact   Deep tendon reflexes are 2+ bilaterally  Gait is intact         Psychiatric:         Behavior: Behavior normal.           No visits with results within 12 Month(s) from this visit.   Latest known visit with results is:   Appointment on 11/01/2022   Component Date Value Ref Range Status   • Fecal Leukocytes 11/01/2022 No WBC's  No WBC's Final   • CRP 11/01/2022 4.0 (H)  <3.0 mg/L Final   • Salmonella sp PCR 11/01/2022 None Detected  None Detected Final   • Shigella sp/Enteroinvasive E. coli* 11/01/2022 None Detected  None Detected Final   • Campylobacter sp (jejuni and coli)* 11/01/2022 None Detected  None Detected Final   • Shiga toxin 1/Shiga toxin 2 genes * 11/01/2022 None Detected  None Detected Final   • Calprotectin 11/08/2022 <16  0 - 120 ug/g Final    Concentration     Interpretation   Follow-Up  <16 - 50 ug/g     Normal           None  >50 -120 ug/g     Borderline       Re-evaluate in 4-6 weeks      >120 ug/g     Abnormal         Repeat as clinically                                     indicated

## 2024-08-02 NOTE — PROGRESS NOTES
Assessment/Plan:    Anxiety disorder  - well controlled   - continue with escitalopram and as needed alprazolam  -Alprazolam refilled as requested.  Patient received only 10 pills.  -[facility] website was queried and did not show misuse  -Controlled substance agreement was signed today  -Will refer patient to psychotherapy as requested    Morbid obesity/BMI 46.47  -We discussed the foundation of diet and exercise and calorie counting for weight loss  -She is moderately active and requires about 2000 lupe for maintenance and was encouraged to cut down to 1500 lupe for weight loss  -We also discussed the GLP-1 agonist as well as phentermine and encouraged her to find out if there is a family history of medullary thyroid cancer or multiple endocrine neoplasia type II prior to prescription of Wegovy if she is interested.  -She will think about it because she is not keen on medications     Diagnoses and all orders for this visit:    Annual physical exam  -     CBC and differential; Future  -     TSH, 3rd generation with Free T4 reflex; Future  -     Comprehensive metabolic panel; Future  -     Lipid panel; Future    Other specified anxiety disorders  -     ALPRAZolam (XANAX) 0.25 mg tablet; Take 1 tablet (0.25 mg total) by mouth daily as needed for anxiety  -     Ambulatory referral to Psych Services; Future  -     escitalopram (LEXAPRO) 5 mg tablet; Take 1 tablet (5 mg total) by mouth daily Take 1/2 pill daily for 2.5 mg daily    BMI 45.0-49.9, adult (HCC)    Other orders  -     Discontinue: escitalopram (LEXAPRO) 5 mg tablet; Take 5 mg by mouth daily Take 1/2 pill daily for 2.5 mg daily            Depression Screening and Follow-up Plan: Patient was screened for depression during today's encounter. They screened negative with a PHQ-2 score of 0.      Subjective:      Patient ID: Eda Santacruz is a 29 y.o. female.    HPI    Today, patient she states that she had lexapro prescribed 5 months ago for gurwinder and it  has been helping and she would like to see a therapist.  She only takes 2.5 mg daily.  She states that she recently got a lot of refill from her online provider but would like to have it refilled by the office next time she needs it.  She ultimately hopes to get off the medication and continue to see a therapist.  She states that her social anxiety is well-controlled by her as needed alprazolam and would like a refill of alprazolam.  She states that she has had a lot of stomach issues and sees a nutritionist and is now gluten free and it helps her symptoms.  She is thinking about weight loss and wants to discuss about the possible medications.  She admits to occasional sinus pressure and menorrhagia but denies any  fever, chills, night sweats, headache, dizziness, nasal congestion, runny nose, pnd, sore throat, ear ache,  wheezing, cough, chest pain, sob, palpitations, nausea, vomiting, diarrhea, constipation, hematochezia, hematuria, melena stools, arthralgias, myalgias, feelings of depression or insomnia.    The following portions of the patient's history were reviewed and updated as appropriate: She  has a past medical history of Allergic, Anxiety, Clotting disorder (HCC), Irritable bowel syndrome (2020), Obesity, and Pyelonephritis (05/16/2021).  She   Patient Active Problem List    Diagnosis Date Noted   • Acute midline thoracic back pain 06/03/2021   • Acute gastritis without hemorrhage 05/20/2021   • Gastroesophageal reflux disease 10/21/2020   • Left upper quadrant abdominal pain 06/11/2020   • Epigastric pain 06/11/2020   • Vaginal discharge 01/15/2020   • BMI 45.0-49.9, adult ([facility]) 11/01/2019   • Hemolytic anemia ([facility]) 10/16/2019   • Need for influenza vaccination 10/12/2019   • Cholecystitis 09/18/2019   • Idiopathic thrombocytopenic purpura (ITP) ([facility]) 09/11/2019   • Hypokalemia 09/11/2019   • Petechiae 09/03/2019   • Thrombocytopenia ([facility]) 09/03/2019   • Seasonal allergies 07/01/2019   • Other specified  anxiety disorders 07/01/2019   • Annual physical exam 07/01/2019   • Morbid obesity (HCC) 07/01/2019     She  has a past surgical history that includes [place] tooth extraction; CHOLECYSTECTOMY LAPAROSCOPIC (N/A, 09/19/2019); Cholecystectomy (9/19/2019); and Colonoscopy (2020).  Her family history includes Anxiety disorder in her maternal grandmother; Diabetes in her paternal aunt and paternal uncle; Hypertension in her father; Psoriasis in her paternal uncle.  She  reports that she has never smoked. She has never used smokeless tobacco. She reports current alcohol use. She reports that she does not use drugs.  Current Outpatient Medications   Medication Sig Dispense Refill   • ALPRAZolam (XANAX) 0.25 mg tablet Take 1 tablet (0.25 mg total) by mouth daily as needed for anxiety 10 tablet 0   • cholestyramine (QUESTRAN) 4 g packet      • escitalopram (LEXAPRO) 5 mg tablet Take 1 tablet (5 mg total) by mouth daily Take 1/2 pill daily for 2.5 mg daily 90 tablet 1   • ondansetron (ZOFRAN) 4 mg tablet Take 1 tablet (4 mg total) by mouth every 8 (eight) hours as needed for nausea or vomiting 30 tablet 0     No current facility-administered medications for this visit.     Current Outpatient Medications on File Prior to Visit   Medication Sig   • cholestyramine (QUESTRAN) 4 g packet    • ondansetron (ZOFRAN) 4 mg tablet Take 1 tablet (4 mg total) by mouth every 8 (eight) hours as needed for nausea or vomiting   • [DISCONTINUED] ALPRAZolam (XANAX) 0.25 mg tablet Take 1 tablet (0.25 mg total) by mouth daily as needed for anxiety   • [DISCONTINUED] escitalopram (LEXAPRO) 5 mg tablet Take 5 mg by mouth daily Take 1/2 pill daily for 2.5 mg daily     No current facility-administered medications on file prior to visit.     She is allergic to ciprofloxacin and pollen extract..    Review of Systems   Constitutional:  Negative for activity change, chills, fatigue, fever and unexpected weight change.   HENT:  Positive for sinus  "pressure. Negative for ear pain, postnasal drip, rhinorrhea and sore throat.    Eyes:  Negative for pain.   Respiratory:  Negative for cough, choking, chest tightness, shortness of breath and wheezing.    Cardiovascular:  Negative for chest pain, palpitations and leg swelling.   Gastrointestinal:  Negative for abdominal pain, constipation, diarrhea, nausea and vomiting.   Genitourinary:  Positive for menstrual problem. Negative for dysuria and hematuria.   Musculoskeletal:  Negative for arthralgias, back pain, gait problem, joint swelling, myalgias and neck stiffness.   Skin:  Negative for pallor and rash.   Neurological:  Negative for dizziness, tremors, seizures, syncope, light-headedness and headaches.   Hematological:  Negative for adenopathy.   Psychiatric/Behavioral:  Negative for behavioral problems. The patient is nervous/anxious.          Objective:      /64 (BP Location: Left arm, Patient Position: Sitting, Cuff Size: Large)   Pulse 77   Temp 97.8 °F (36.6 °C) (Temporal)   Ht 5' 4.5\" (1.638 m)   Wt 125 kg (275 lb)   SpO2 100%   BMI 46.47 kg/m²          Physical Exam  Constitutional:       General: She is not in acute distress.     Appearance: She is well-developed. She is obese. She is not diaphoretic.      Comments: BMI is 46.47   HENT:      Head: Normocephalic and atraumatic.      Right Ear: External ear normal.      Left Ear: External ear normal.      Nose: Nose normal.      Mouth/Throat:      Mouth: Mucous membranes are dry.      Pharynx: Posterior oropharyngeal erythema (Oropharyngeal erythema with dry mucous membranes) present. No oropharyngeal exudate.   Eyes:      General: No scleral icterus.        Right eye: No discharge.         Left eye: No discharge.      Conjunctiva/sclera: Conjunctivae normal.      Pupils: Pupils are equal, round, and reactive to light.   Neck:      Thyroid: No thyromegaly.      Vascular: No JVD.      Trachea: No tracheal deviation.   Cardiovascular:      Rate " and Rhythm: Normal rate and regular rhythm.      Heart sounds: Normal heart sounds. No murmur heard.     No friction rub. No gallop.   Pulmonary:      Effort: Pulmonary effort is normal. No respiratory distress.      Breath sounds: Normal breath sounds. No wheezing or rales.   Chest:      Chest wall: No tenderness.   Abdominal:      General: Bowel sounds are normal. There is no distension.      Palpations: Abdomen is soft. There is no mass.      Tenderness: There is no abdominal tenderness. There is no guarding or rebound.   Musculoskeletal:         General: No tenderness or deformity. Normal range of motion.      Cervical back: Normal range of motion and neck supple.   Lymphadenopathy:      Cervical: No cervical adenopathy.   Skin:     General: Skin is warm and dry.      Coloration: Skin is not pale.      Findings: No erythema or rash.   Neurological:      Mental Status: She is alert and oriented to person, place, and time.      Cranial Nerves: No cranial nerve deficit.      Motor: No abnormal muscle tone.      Coordination: Coordination normal.      Deep Tendon Reflexes: Reflexes are normal and symmetric.   Psychiatric:         Behavior: Behavior normal.           No visits with results within 12 Month(s) from this visit.   Latest known visit with results is:   Appointment on 11/01/2022   Component Date Value Ref Range Status   • Fecal Leukocytes 11/01/2022 No WBC's  No WBC's Final   • CRP 11/01/2022 4.0 (H)  <3.0 mg/L Final   • Salmonella sp PCR 11/01/2022 None Detected  None Detected Final   • Shigella sp/Enteroinvasive E. coli* 11/01/2022 None Detected  None Detected Final   • Campylobacter sp (jejuni and coli)* 11/01/2022 None Detected  None Detected Final   • Shiga toxin 1/Shiga toxin 2 genes * 11/01/2022 None Detected  None Detected Final   • Calprotectin 11/08/2022 <16  0 - 120 ug/g Final    Concentration     Interpretation   Follow-Up  <16 - 50 ug/g     Normal           None  >50 -120 ug/g     Borderline        Re-evaluate in 4-6 weeks      >120 ug/g     Abnormal         Repeat as clinically                                     indicated

## 2024-08-02 NOTE — PATIENT INSTRUCTIONS
"Patient Education     Routine physical for adults   The Basics   Written by the doctors and editors at [facility]   What is a physical? -- A physical is a routine visit, or \"check-up,\" with your doctor. You might also hear it called a \"wellness visit\" or \"preventive visit.\"  During each visit, the doctor will:   Ask about your physical and mental health   Ask about your habits, behaviors, and lifestyle   Do an exam   Give you vaccines if needed   Talk to you about any medicines you take   Give advice about your health   Answer your questions  Getting regular check-ups is an important part of taking care of your health. It can help your doctor find and treat any problems you have. But it's also important for preventing health problems.  A routine physical is different from a \"sick visit.\" A sick visit is when you see a doctor because of a health concern or problem. Since physicals are scheduled ahead of time, you can think about what you want to ask the doctor.  How often should I get a physical? -- It depends on your age and health. In general, for people age 21 years and older:   If you are younger than 50 years, you might be able to get a physical every 3 years.   If you are 50 years or older, your doctor might recommend a physical every year.  If you have an ongoing health condition, like diabetes or high blood pressure, your doctor will probably want to see you more often.  What happens during a physical? -- In general, each visit will include:   Physical exam - The doctor or nurse will check your height, weight, heart rate, and blood pressure. They will also look at your eyes and ears. They will ask about how you are feeling and whether you have any symptoms that bother you.   Medicines - It's a good idea to bring a list of all the medicines you take to each doctor visit. Your doctor will talk to you about your medicines and answer any questions. Tell them if you are having any side effects that bother you. You " "should also tell them if you are having trouble paying for any of your medicines.   Habits and behaviors - This includes:   Your diet   Your exercise habits   Whether you smoke, drink alcohol, or use drugs   Whether you are sexually active   Whether you feel safe at home  Your doctor will talk to you about things you can do to improve your health and lower your risk of health problems. They will also offer help and support. For example, if you want to quit smoking, they can give you advice and might prescribe medicines. If you want to improve your diet or get more physical activity, they can help you with this, too.   Lab tests, if needed - The tests you get will depend on your age and situation. For example, your doctor might want to check your:   Cholesterol   Blood sugar   Iron level   Vaccines - The recommended vaccines will depend on your age, health, and what vaccines you already had. Vaccines are very important because they can prevent certain serious or deadly infections.   Discussion of screening - \"Screening\" means checking for diseases or other health problems before they cause symptoms. Your doctor can recommend screening based on your age, risk, and preferences. This might include tests to check for:   Cancer, such as breast, prostate, cervical, ovarian, colorectal, prostate, lung, or skin cancer   Sexually transmitted infections, such as chlamydia and gonorrhea   Mental health conditions like depression and anxiety  Your doctor will talk to you about the different types of screening tests. They can help you decide which screenings to have. They can also explain what the results might mean.   Answering questions - The physical is a good time to ask the doctor or nurse questions about your health. If needed, they can refer you to other doctors or specialists, too.  Adults older than 65 years often need other care, too. As you get older, your doctor will talk to you about:   How to prevent falling at " home   Hearing or vision tests   Memory testing   How to take your medicines safely   Making sure that you have the help and support you need at home  All topics are updated as new evidence becomes available and our peer review process is complete.  This topic retrieved from [employer] on: May 02, 2024.  Topic 162166 Version 1.0  Release: 32.4.3 - C32.122  © 2024 Up[employer]Date, Inc. and/or its affiliates. All rights reserved.  Consumer Information Use and Disclaimer   Disclaimer: This generalized information is a limited summary of diagnosis, treatment, and/or medication information. It is not meant to be comprehensive and should be used as a tool to help the user understand and/or assess potential diagnostic and treatment options. It does NOT include all information about conditions, treatments, medications, side effects, or risks that may apply to a specific patient. It is not intended to be medical advice or a substitute for the medical advice, diagnosis, or treatment of a health care provider based on the health care provider's examination and assessment of a patient's specific and unique circumstances. Patients must speak with a health care provider for complete information about their health, medical questions, and treatment options, including any risks or benefits regarding use of medications. This information does not endorse any treatments or medications as safe, effective, or approved for treating a specific patient. UpToDate, Inc. and its affiliates disclaim any warranty or liability relating to this information or the use thereof.The use of this information is governed by the Terms of Use, available at https://www.[employer].com/en/know/clinical-effectiveness-terms. 2024© UpToDate, Inc. and its affiliates and/or licensors. All rights reserved.  Copyright   © 2024 Up[employer]Date, Inc. and/or its affiliates. All rights reserved.

## 2024-08-05 ENCOUNTER — TELEPHONE (OUTPATIENT)
Age: 29
End: 2024-08-05

## 2024-08-05 NOTE — TELEPHONE ENCOUNTER
Reached out to patient in regards to routine referral and adding to proper wait list. Left voicemail for pt to contact intake department at [phone].

## 2024-08-08 NOTE — TELEPHONE ENCOUNTER
Second outreach attempted regarding referral to verify pt's needs of service and inform of current wait list . Message left for patient to call back [phone].

## 2024-08-13 NOTE — TELEPHONE ENCOUNTER
Called Pt in regards to referral received, in attempts to verify needs of services and advised of current wait list.  Spoke to Pt whom stated that Is interested in talk therapy services and would like to be added to proper wait list.   Closing referral at this time.

## 2025-02-18 ENCOUNTER — TELEPHONE (OUTPATIENT)
Dept: INTERNAL MEDICINE CLINIC | Age: 30
End: 2025-02-18

## 2025-04-07 ENCOUNTER — TELEPHONE (OUTPATIENT)
Age: 30
End: 2025-04-07

## 2025-04-08 NOTE — TELEPHONE ENCOUNTER
Pt called back and confirmed interest for talk therapy.     [facility]   Female  Open to virtual

## (undated) DEVICE — GLOVE INDICATOR PI UNDERGLOVE SZ 6.5 BLUE

## (undated) DEVICE — ENDOPOUCH RETRIEVER SPECIMEN RETRIEVAL BAGS: Brand: ENDOPOUCH RETRIEVER

## (undated) DEVICE — DRAPE EQUIPMENT RF WAND

## (undated) DEVICE — GLOVE INDICATOR PI UNDERGLOVE SZ 8 BLUE

## (undated) DEVICE — BLUE HEAT SCOPE WARMER

## (undated) DEVICE — GLOVE SRG BIOGEL 6

## (undated) DEVICE — TROCAR: Brand: KII FIOS FIRST ENTRY

## (undated) DEVICE — INTENDED FOR TISSUE SEPARATION, AND OTHER PROCEDURES THAT REQUIRE A SHARP SURGICAL BLADE TO PUNCTURE OR CUT.: Brand: BARD-PARKER SAFETY BLADES SIZE 11, STERILE

## (undated) DEVICE — TROCAR: Brand: KII SLEEVE

## (undated) DEVICE — GLOVE SRG BIOGEL 6.5

## (undated) DEVICE — GLOVE PI ULTRA TOUCH SZ 6

## (undated) DEVICE — ENDOPATH 5MM CURVED SCISSORS WITH MONOPOLAR CAUTERY: Brand: ENDOPATH

## (undated) DEVICE — GLOVE SRG BIOGEL ECLIPSE 7.5

## (undated) DEVICE — TUBING SMOKE EVAC W/FILTRATION DEVICE PLUMEPORT ACTIV

## (undated) DEVICE — SCD SEQUENTIAL COMPRESSION COMFORT SLEEVE MEDIUM KNEE LENGTH: Brand: KENDALL SCD

## (undated) DEVICE — ALLENTOWN LAP CHOLE APP PACK: Brand: CARDINAL HEALTH

## (undated) DEVICE — IRRIG ENDO FLO TUBING

## (undated) DEVICE — [HIGH FLOW INSUFFLATOR,  DO NOT USE IF PACKAGE IS DAMAGED,  KEEP DRY,  KEEP AWAY FROM SUNLIGHT,  PROTECT FROM HEAT AND RADIOACTIVE SOURCES.]: Brand: PNEUMOSURE

## (undated) DEVICE — SUT VICRYL 0 UR-6 27 IN J603H

## (undated) DEVICE — ADHESIVE SKIN HIGH VISCOSITY EXOFIN 1ML

## (undated) DEVICE — PENCIL ELECTROSURG E-Z CLEAN -0035H

## (undated) DEVICE — CHLORAPREP HI-LITE 26ML ORANGE

## (undated) DEVICE — LIGAMAX 5 MM ENDOSCOPIC MULTIPLE CLIP APPLIER: Brand: LIGAMAX

## (undated) DEVICE — SUT MONOCRYL 4-0 PS-2 27 IN Y426H

## (undated) DEVICE — 3000CC GUARDIAN II: Brand: GUARDIAN

## (undated) DEVICE — PMI DISPOSABLE PUNCTURE CLOSURE DEVICE / SUTURE GRASPER: Brand: PMI

## (undated) DEVICE — ELECTRODE LAP J HOOK SPLIT STEM E-Z CLEAN 33CM -0021S